# Patient Record
Sex: FEMALE | Race: WHITE | NOT HISPANIC OR LATINO | Employment: UNEMPLOYED | URBAN - METROPOLITAN AREA
[De-identification: names, ages, dates, MRNs, and addresses within clinical notes are randomized per-mention and may not be internally consistent; named-entity substitution may affect disease eponyms.]

---

## 2018-02-12 ENCOUNTER — TRANSCRIBE ORDERS (OUTPATIENT)
Dept: ENDOCRINOLOGY | Facility: CLINIC | Age: 68
End: 2018-02-12

## 2018-02-12 DIAGNOSIS — E03.9 HYPOTHYROIDISM, UNSPECIFIED TYPE: Primary | ICD-10-CM

## 2018-02-16 ENCOUNTER — TELEPHONE (OUTPATIENT)
Dept: INTERNAL MEDICINE | Facility: CLINIC | Age: 68
End: 2018-02-16

## 2018-07-11 ENCOUNTER — OFFICE VISIT (OUTPATIENT)
Dept: ENDOCRINOLOGY | Facility: CLINIC | Age: 68
End: 2018-07-11

## 2018-07-11 VITALS
DIASTOLIC BLOOD PRESSURE: 76 MMHG | HEIGHT: 65 IN | BODY MASS INDEX: 38.35 KG/M2 | HEART RATE: 64 BPM | OXYGEN SATURATION: 98 % | WEIGHT: 230.2 LBS | SYSTOLIC BLOOD PRESSURE: 126 MMHG

## 2018-07-11 DIAGNOSIS — R79.89 ABNORMAL THYROID BLOOD TEST: Primary | ICD-10-CM

## 2018-07-11 DIAGNOSIS — E61.1 IRON DEFICIENCY: ICD-10-CM

## 2018-07-11 LAB
HGB BLD-MCNC: 9.9 G/DL (ref 11.5–15.5)
IRON 24H UR-MRATE: 48 MCG/DL (ref 50–175)
IRON SATN MFR SERPL: 11 % (ref 15–50)
T4 FREE SERPL-MCNC: 1.27 NG/DL (ref 0.89–1.76)
TIBC SERPL-MCNC: 432 MCG/DL (ref 250–450)
TSH SERPL DL<=0.05 MIU/L-ACNC: 3.23 MIU/ML (ref 0.35–5.35)

## 2018-07-11 PROCEDURE — 83550 IRON BINDING TEST: CPT | Performed by: INTERNAL MEDICINE

## 2018-07-11 PROCEDURE — 99204 OFFICE O/P NEW MOD 45 MIN: CPT | Performed by: INTERNAL MEDICINE

## 2018-07-11 PROCEDURE — 83540 ASSAY OF IRON: CPT | Performed by: INTERNAL MEDICINE

## 2018-07-11 PROCEDURE — 86376 MICROSOMAL ANTIBODY EACH: CPT | Performed by: INTERNAL MEDICINE

## 2018-07-11 PROCEDURE — 84439 ASSAY OF FREE THYROXINE: CPT | Performed by: INTERNAL MEDICINE

## 2018-07-11 PROCEDURE — 84443 ASSAY THYROID STIM HORMONE: CPT | Performed by: INTERNAL MEDICINE

## 2018-07-11 PROCEDURE — 85018 HEMOGLOBIN: CPT | Performed by: INTERNAL MEDICINE

## 2018-07-11 RX ORDER — ASPIRIN 81 MG/1
81 TABLET, CHEWABLE ORAL DAILY
COMMUNITY

## 2018-07-11 RX ORDER — TRAZODONE HYDROCHLORIDE 100 MG/1
100 TABLET ORAL NIGHTLY
COMMUNITY
End: 2022-04-25

## 2018-07-11 RX ORDER — METOPROLOL SUCCINATE 50 MG/1
50 TABLET, EXTENDED RELEASE ORAL DAILY
COMMUNITY
End: 2020-03-24 | Stop reason: SDUPTHER

## 2018-07-11 RX ORDER — ATORVASTATIN CALCIUM 20 MG/1
20 TABLET, FILM COATED ORAL DAILY
COMMUNITY
End: 2022-06-01

## 2018-07-11 RX ORDER — OMEPRAZOLE 40 MG/1
40 CAPSULE, DELAYED RELEASE ORAL DAILY
COMMUNITY
End: 2022-04-05

## 2018-07-11 RX ORDER — POTASSIUM CHLORIDE 20MEQ/15ML
LIQUID (ML) ORAL DAILY
COMMUNITY
End: 2022-06-25

## 2018-07-11 RX ORDER — FUROSEMIDE 40 MG/1
40 TABLET ORAL 2 TIMES DAILY
COMMUNITY
End: 2022-09-08

## 2018-07-11 RX ORDER — ALBUTEROL SULFATE 1.25 MG/3ML
1 SOLUTION RESPIRATORY (INHALATION) EVERY 6 HOURS PRN
COMMUNITY
End: 2022-09-08

## 2018-07-11 NOTE — PROGRESS NOTES
Hypothyroidism (Consult for Dr Farrell)    Subjective    Laura Hui is a 68 y.o. female. she is being seen for consultation today at the request of  Sang Farrell MD for evaluation of abnormal thyroid tests.   Patient had abnormal TSh 6.9 during routine labs in 1/2018. Repeat levels in 4/2018 showed normal TFT.   She reported sx of fatigue, leg swelling, weight gain from 170 to 230 lbs.   She has a history of cardiomyopathy and takes diuretics.     Labs on 2011 showed suppressed TSh 0.01 and 0.3. Old labs and records reviewed.       History of Present Illness    Review of Systems  Review of Systems   Constitutional: Positive for fatigue and unexpected weight gain.   HENT: Positive for hearing loss.    Respiratory: Positive for shortness of breath.    Cardiovascular: Positive for palpitations and leg swelling.   Gastrointestinal: Positive for constipation (on iron).   Musculoskeletal: Positive for joint swelling and myalgias.   Neurological: Positive for weakness and headache.   Hematological: Positive for adenopathy.   Psychiatric/Behavioral: Positive for sleep disturbance.   All other systems reviewed and are negative.    Current medications:  Current Outpatient Prescriptions   Medication Sig Dispense Refill   • albuterol (ACCUNEB) 1.25 MG/3ML nebulizer solution Take 1 ampule by nebulization Every 6 (Six) Hours As Needed for Wheezing.     • aspirin 81 MG chewable tablet Chew 81 mg Daily.     • atorvastatin (LIPITOR) 20 MG tablet Take 20 mg by mouth Daily.     • furosemide (LASIX) 40 MG tablet Take 40 mg by mouth 2 (Two) Times a Day.     • metoprolol succinate XL (TOPROL-XL) 50 MG 24 hr tablet Take 50 mg by mouth Daily.     • omeprazole (priLOSEC) 40 MG capsule Take 40 mg by mouth Daily.     • Ped Multivitamins-Fl-Iron (MULTIVITAMIN WITH FLUORIDE/IRON) 0.25-10 MG/ML solution solution Take  by mouth Daily.     • potassium chloride (KAYCIEL) 20 MEQ/15ML (10%) solution Take  by mouth Daily.     • traZODone  "(DESYREL) 100 MG tablet Take 100 mg by mouth Every Night.       No current facility-administered medications for this visit.        The following portions of the patient's history were reviewed and updated as appropriate: She  has a past medical history of Acid reflux; Arthritis; COPD (chronic obstructive pulmonary disease) (CMS/HCC); Depression; Gall stones; and Heart disease.  She  has a past surgical history that includes Cardiac surgery; Back surgery; Neck surgery; and Cholecystectomy.  Her family history includes Arthritis in her maternal grandmother; Breast cancer in her mother; Heart attack in her maternal grandmother; Multiple sclerosis in her mother; Thyroid disease in her mother.  She  reports that she quit smoking about 10 years ago. She does not have any smokeless tobacco history on file. She reports that she does not drink alcohol or use drugs.  She is allergic to sulfa antibiotics..        Objective      Vitals:    07/11/18 0832   BP: 126/76   Pulse: 64   SpO2: 98%   Weight: 104 kg (230 lb 3.2 oz)   Height: 165.1 cm (65\")   Body mass index is 38.31 kg/m².  Physical Exam   Constitutional: She is oriented to person, place, and time. She appears well-developed and well-nourished.   HENT:   Head: Normocephalic and atraumatic.   Eyes: Conjunctivae and EOM are normal. Pupils are equal, round, and reactive to light.   Neck: Thyromegaly present.   Cardiovascular: Normal rate, regular rhythm, normal heart sounds and intact distal pulses.    Pulmonary/Chest: Effort normal and breath sounds normal.   Musculoskeletal: She exhibits no edema.   Lymphadenopathy:     She has no cervical adenopathy.   Neurological: She is alert and oriented to person, place, and time.   Psychiatric: She has a normal mood and affect. Thought content normal.       LABS AND IMAGING  No visits with results within 1 Month(s) from this visit.   Latest known visit with results is:   No results found for any previous visit.       1. Abnormal " thyroid blood test    2. Iron deficiency        Assessment/Plan      Problem List Items Addressed This Visit        Other    Abnormal thyroid blood test - Primary    Relevant Orders    T4, Free    TSH    Thyroid Peroxidase Antibody      Other Visit Diagnoses     Iron deficiency        Relevant Orders    Iron Profile    Hemoglobin            PLAN  -  Patient had fluctuating thyroid levels over the years. Labs in Jan suggestive of hypothyroidism.   I will repeat comprehensive thyroid panel and consider low dose levothyroxine.   Patient had thyromegaly on the exam and I have performed bedside u/s. She has normal architecture exam with several nodules with egg shell calcifications.   Right nodule is 8 x 10 mm in size and left nodule has linear calcifications, measures1.13 cm in largest dimension. Both nodules appear benign.       - diagnosis was discussed with the patient, and her questions were answered.     - Test and referrals ordered:  Orders Placed This Encounter   Procedures   • T4, Free   • TSH   • Thyroid Peroxidase Antibody   • Iron Profile     Order Specific Question:   Is the patient on iron therapy?     Answer:   No   • Hemoglobin   I will send a copy to Dr Farrell, included iron test.   - repeat u/s in 18 months.

## 2018-07-12 LAB — THYROPEROXIDASE AB SERPL-ACNC: 16 IU/ML (ref 0–34)

## 2020-03-24 ENCOUNTER — OFFICE VISIT (OUTPATIENT)
Dept: ENDOCRINOLOGY | Facility: CLINIC | Age: 70
End: 2020-03-24

## 2020-03-24 VITALS
DIASTOLIC BLOOD PRESSURE: 78 MMHG | WEIGHT: 234.2 LBS | OXYGEN SATURATION: 95 % | BODY MASS INDEX: 36.76 KG/M2 | HEIGHT: 67 IN | HEART RATE: 60 BPM | SYSTOLIC BLOOD PRESSURE: 124 MMHG

## 2020-03-24 DIAGNOSIS — R79.89 ABNORMAL THYROID BLOOD TEST: Primary | ICD-10-CM

## 2020-03-24 DIAGNOSIS — E04.2 NONTOXIC MULTINODULAR GOITER: ICD-10-CM

## 2020-03-24 LAB
ALBUMIN SERPL-MCNC: 4.1 G/DL (ref 3.5–5.2)
ALBUMIN/GLOB SERPL: 1.6 G/DL
ALP SERPL-CCNC: 144 U/L (ref 39–117)
ALT SERPL W P-5'-P-CCNC: 14 U/L (ref 1–33)
ANION GAP SERPL CALCULATED.3IONS-SCNC: 10.4 MMOL/L (ref 5–15)
AST SERPL-CCNC: 22 U/L (ref 1–32)
BILIRUB SERPL-MCNC: 0.3 MG/DL (ref 0.2–1.2)
BUN BLD-MCNC: 7 MG/DL (ref 8–23)
BUN/CREAT SERPL: 8 (ref 7–25)
CALCIUM SPEC-SCNC: 9.9 MG/DL (ref 8.6–10.5)
CHLORIDE SERPL-SCNC: 100 MMOL/L (ref 98–107)
CO2 SERPL-SCNC: 29.6 MMOL/L (ref 22–29)
CREAT BLD-MCNC: 0.87 MG/DL (ref 0.57–1)
GFR SERPL CREATININE-BSD FRML MDRD: 64 ML/MIN/1.73
GLOBULIN UR ELPH-MCNC: 2.5 GM/DL
GLUCOSE BLD-MCNC: 93 MG/DL (ref 65–99)
POTASSIUM BLD-SCNC: 4.2 MMOL/L (ref 3.5–5.2)
PROT SERPL-MCNC: 6.6 G/DL (ref 6–8.5)
SODIUM BLD-SCNC: 140 MMOL/L (ref 136–145)
T4 FREE SERPL-MCNC: 1.25 NG/DL (ref 0.93–1.7)
TSH SERPL DL<=0.05 MIU/L-ACNC: 1.9 UIU/ML (ref 0.27–4.2)

## 2020-03-24 PROCEDURE — 80053 COMPREHEN METABOLIC PANEL: CPT | Performed by: INTERNAL MEDICINE

## 2020-03-24 PROCEDURE — 84439 ASSAY OF FREE THYROXINE: CPT | Performed by: INTERNAL MEDICINE

## 2020-03-24 PROCEDURE — 76536 US EXAM OF HEAD AND NECK: CPT | Performed by: INTERNAL MEDICINE

## 2020-03-24 PROCEDURE — 99213 OFFICE O/P EST LOW 20 MIN: CPT | Performed by: INTERNAL MEDICINE

## 2020-03-24 PROCEDURE — 84443 ASSAY THYROID STIM HORMONE: CPT | Performed by: INTERNAL MEDICINE

## 2020-03-24 RX ORDER — POTASSIUM CHLORIDE 20 MEQ/1
TABLET, EXTENDED RELEASE ORAL
COMMUNITY
Start: 2020-03-03 | End: 2022-06-25 | Stop reason: SDUPTHER

## 2020-03-24 RX ORDER — CITALOPRAM 20 MG/1
TABLET ORAL
COMMUNITY
Start: 2020-03-20 | End: 2022-04-05

## 2020-03-24 RX ORDER — METOPROLOL TARTRATE 50 MG/1
TABLET, FILM COATED ORAL
COMMUNITY
Start: 2020-03-12 | End: 2022-07-29

## 2020-03-24 NOTE — PROGRESS NOTES
Thyroid Problem (Follow Up:  Son passed away last week. )    Subjective    Laura Hui is a 70 y.o. female. she is being seen for follow-up of abnormal thyroid tests.   Patient had abnormal TSh 6.9 during routine labs in 1/2018. Repeat levels in 7/2018 showed normal TFT. Bedside u/s evaluation was done at that time and 1 cm nodule left lobe seen   She reported sx of fatigue, leg swelling, weight gain from 170 to 234 lbs.   Sx are stable and unchanged. C/o swelling in the supraclavicular area.       Thyroid Problem   Symptoms include constipation (on iron), fatigue, palpitations and weight gain.       Review of Systems  Review of Systems   Constitutional: Positive for fatigue and unexpected weight gain.   HENT: Positive for hearing loss.    Respiratory: Positive for shortness of breath.    Cardiovascular: Positive for palpitations and leg swelling.   Gastrointestinal: Positive for constipation (on iron).   Musculoskeletal: Positive for joint swelling and myalgias.   Neurological: Positive for weakness and headache.   Hematological: Positive for adenopathy.   Psychiatric/Behavioral: Positive for sleep disturbance.   All other systems reviewed and are negative.    Current medications:  Current Outpatient Medications   Medication Sig Dispense Refill   • albuterol (ACCUNEB) 1.25 MG/3ML nebulizer solution Take 1 ampule by nebulization Every 6 (Six) Hours As Needed for Wheezing.     • aspirin 81 MG chewable tablet Chew 81 mg Daily.     • atorvastatin (LIPITOR) 20 MG tablet Take 20 mg by mouth Daily.     • furosemide (LASIX) 40 MG tablet Take 40 mg by mouth 2 (Two) Times a Day.     • omeprazole (priLOSEC) 40 MG capsule Take 40 mg by mouth Daily.     • Ped Multivitamins-Fl-Iron (MULTIVITAMIN WITH FLUORIDE/IRON) 0.25-10 MG/ML solution solution Take  by mouth Daily.     • potassium chloride (KAYCIEL) 20 MEQ/15ML (10%) solution Take  by mouth Daily.     • traZODone (DESYREL) 100 MG tablet Take 100 mg by mouth Every Night.    "  • citalopram (CeleXA) 20 MG tablet      • metoprolol tartrate (LOPRESSOR) 50 MG tablet      • potassium chloride (K-DUR,KLOR-CON) 20 MEQ CR tablet      • TRELEGY ELLIPTA 100-62.5-25 MCG/INH aerosol powder         No current facility-administered medications for this visit.        The following portions of the patient's history were reviewed and updated as appropriate: She  has a past medical history of Acid reflux, Arthritis, COPD (chronic obstructive pulmonary disease) (CMS/HCC), Depression, Gall stones, and Heart disease.  She  has a past surgical history that includes Cardiac surgery; Back surgery; Neck surgery; and Cholecystectomy.  Her family history includes Arthritis in her maternal grandmother; Breast cancer in her mother; Cancer in an other family member; Heart attack in her maternal grandmother; Multiple sclerosis in her mother; Thyroid disease in her mother.  She  reports that she quit smoking about 11 years ago. She does not have any smokeless tobacco history on file. She reports that she does not drink alcohol or use drugs.  She is allergic to sulfa antibiotics..        Objective      Vitals:    03/24/20 1410   BP: 124/78   Pulse: 60   SpO2: 95%   Weight: 106 kg (234 lb 3.2 oz)   Height: 170.2 cm (67\")   Body mass index is 36.68 kg/m².  Physical Exam   Constitutional: She is oriented to person, place, and time. She appears well-developed and well-nourished.   Obese. Fat deposition above the clavicle. No tumors palpated    HENT:   Head: Normocephalic and atraumatic.   Eyes: Conjunctivae are normal.   Neck: Thyromegaly present.   Cardiovascular: Normal rate, regular rhythm, normal heart sounds and intact distal pulses.   Pulmonary/Chest: Effort normal and breath sounds normal.   Musculoskeletal: She exhibits no edema.   Lymphadenopathy:     She has no cervical adenopathy.   Neurological: She is alert and oriented to person, place, and time.   Psychiatric: She has a normal mood and affect. Thought " content normal.       LABS AND IMAGING  No visits with results within 1 Month(s) from this visit.   Latest known visit with results is:   Office Visit on 07/11/2018   Component Date Value Ref Range Status   • Free T4 07/11/2018 1.27  0.89 - 1.76 ng/dL Final   • TSH 07/11/2018 3.233  0.350 - 5.350 mIU/mL Final   • Thyroid Peroxidase Antibody 07/11/2018 16  0 - 34 IU/mL Final   • Iron 07/11/2018 48* 50 - 175 mcg/dL Final   • TIBC 07/11/2018 432  250 - 450 mcg/dL Final   • Iron Saturation 07/11/2018 11* 15 - 50 % Final   • Hemoglobin 07/11/2018 9.9* 11.5 - 15.5 g/dL Final     Thyroid ultrasound 03/24/20        Real time high resolution imaging of the thyroid gland was performed in transverse and longitudinal planes.   Previous images were reviewed and compared to the current appearance to assess stability.       The right lobe measured 4.78 cm L x 1.37 cm AP x 1.71 cm in TV dimension.    The isthmus measured 0.36 cm in thickness.    The left thyroid lobe measured 4.36 cm L x 1.55 cm AP x 1.52 cm in TV dimension.    Thyroid gland is heterogeneous and contains multiple nodules.    Nodule 1   is located in the right mid lobe and measures 0.75  x 0.65 x 0.75 cm (L X AP X TV).  This nodule is solid, homogeneous, hypoechoic with well-defined margins, and Grade I vascularity on Color Flow Doppler.  It has artifacts:  eggshell calcifications    Left lobe contains 1.03 cm linear calcification.     No pathologic lymph nodes were seen.      Assessment: Stable appearance of the thyroid   Follow-up ultrasound in 18 months      1. Abnormal thyroid blood test    2. Nontoxic multinodular goiter        Assessment/Plan      Problem List Items Addressed This Visit        Other    Abnormal thyroid blood test - Primary      Other Visit Diagnoses     Nontoxic multinodular goiter        Relevant Medications    metoprolol tartrate (LOPRESSOR) 50 MG tablet            PLAN  -  Patient had fluctuating thyroid levels over the years. Labs repeated  today    -thyroid u/s showed stable nodules.     - repeat u/s in 18 months.

## 2022-03-31 RX ORDER — BUMETANIDE 1 MG/1
1 TABLET ORAL DAILY
COMMUNITY
End: 2022-03-31 | Stop reason: SDUPTHER

## 2022-04-01 RX ORDER — BUMETANIDE 1 MG/1
1 TABLET ORAL DAILY
Qty: 30 TABLET | Refills: 0 | Status: SHIPPED | OUTPATIENT
Start: 2022-04-01 | End: 2022-05-09 | Stop reason: SDUPTHER

## 2022-04-05 RX ORDER — OMEPRAZOLE 40 MG/1
CAPSULE, DELAYED RELEASE ORAL
Qty: 90 CAPSULE | Refills: 0 | Status: SHIPPED | OUTPATIENT
Start: 2022-04-05 | End: 2022-07-05

## 2022-04-05 RX ORDER — CITALOPRAM 20 MG/1
TABLET ORAL
Qty: 90 TABLET | Refills: 0 | Status: SHIPPED | OUTPATIENT
Start: 2022-04-05 | End: 2022-07-05

## 2022-04-25 RX ORDER — TRAZODONE HYDROCHLORIDE 100 MG/1
TABLET ORAL
Qty: 180 TABLET | Refills: 1 | Status: SHIPPED | OUTPATIENT
Start: 2022-04-25 | End: 2022-09-08 | Stop reason: SDUPTHER

## 2022-05-03 RX ORDER — FLUTICASONE PROPIONATE 50 MCG
SPRAY, SUSPENSION (ML) NASAL
Qty: 16 ML | Refills: 2 | Status: SHIPPED | OUTPATIENT
Start: 2022-05-03 | End: 2022-09-07 | Stop reason: SDUPTHER

## 2022-05-09 ENCOUNTER — TELEPHONE (OUTPATIENT)
Dept: FAMILY MEDICINE CLINIC | Facility: CLINIC | Age: 72
End: 2022-05-09

## 2022-05-09 RX ORDER — BUMETANIDE 1 MG/1
1 TABLET ORAL DAILY
Qty: 30 TABLET | Refills: 0 | Status: SHIPPED | OUTPATIENT
Start: 2022-05-09 | End: 2022-06-07

## 2022-05-09 NOTE — TELEPHONE ENCOUNTER
Incoming Refill Request      Medication requested (name and dose):   BUMETANIDE 1 MG    [facility] where [facility] be sent[facility]    Additional details provided by patient: PT TOOK LAST DOSE TODAY    Best call back number: [phone]    Does the patient have less than a 3 day supply:  [x] Yes  [] No    Maulik ARREAGA Rep  05/09/22, 11:10 EDT

## 2022-06-01 RX ORDER — ATORVASTATIN CALCIUM 20 MG/1
TABLET, FILM COATED ORAL
Qty: 90 TABLET | Refills: 0 | Status: SHIPPED | OUTPATIENT
Start: 2022-06-01 | End: 2022-09-01

## 2022-06-07 RX ORDER — BUMETANIDE 1 MG/1
TABLET ORAL
Qty: 30 TABLET | Refills: 0 | Status: SHIPPED | OUTPATIENT
Start: 2022-06-07 | End: 2022-07-05

## 2022-06-24 ENCOUNTER — TELEPHONE (OUTPATIENT)
Dept: FAMILY MEDICINE CLINIC | Facility: CLINIC | Age: 72
End: 2022-06-24

## 2022-06-24 NOTE — TELEPHONE ENCOUNTER
Pharmacy called. Said that Pt's insurance will no longer fill potassium chloride packets but will fill potassium chloride -[employer]. Pharmacy is requesting updated prescription be sent over.

## 2022-06-25 RX ORDER — POTASSIUM CHLORIDE 20 MEQ/1
20 TABLET, EXTENDED RELEASE ORAL DAILY
Qty: 90 TABLET | Refills: 1 | Status: SHIPPED | OUTPATIENT
Start: 2022-06-25 | End: 2022-09-08 | Stop reason: SDUPTHER

## 2022-07-05 RX ORDER — OMEPRAZOLE 40 MG/1
CAPSULE, DELAYED RELEASE ORAL
Qty: 90 CAPSULE | Refills: 0 | Status: SHIPPED | OUTPATIENT
Start: 2022-07-05 | End: 2022-09-08 | Stop reason: SDUPTHER

## 2022-07-05 RX ORDER — BUMETANIDE 1 MG/1
TABLET ORAL
Qty: 30 TABLET | Refills: 0 | Status: SHIPPED | OUTPATIENT
Start: 2022-07-05 | End: 2022-08-09

## 2022-07-05 RX ORDER — CITALOPRAM 20 MG/1
TABLET ORAL
Qty: 90 TABLET | Refills: 0 | Status: SHIPPED | OUTPATIENT
Start: 2022-07-05 | End: 2022-09-08 | Stop reason: SDUPTHER

## 2022-07-22 RX ORDER — AZELASTINE 1 MG/ML
SPRAY, METERED NASAL
Qty: 30 ML | Refills: 2 | Status: SHIPPED | OUTPATIENT
Start: 2022-07-22 | End: 2023-02-06

## 2022-07-29 RX ORDER — METOPROLOL TARTRATE 50 MG/1
TABLET, FILM COATED ORAL
Qty: 180 TABLET | Refills: 0 | Status: SHIPPED | OUTPATIENT
Start: 2022-07-29 | End: 2022-09-08 | Stop reason: SDUPTHER

## 2022-08-09 RX ORDER — BUMETANIDE 1 MG/1
TABLET ORAL
Qty: 30 TABLET | Refills: 0 | Status: SHIPPED | OUTPATIENT
Start: 2022-08-09 | End: 2022-09-07 | Stop reason: SDUPTHER

## 2022-08-09 NOTE — TELEPHONE ENCOUNTER
Caller: Laura Hui    Relationship: Self    Best call back number: [phone]    Requested Prescriptions:   Requested Prescriptions     Pending Prescriptions Disp Refills   • bumetanide (BUMEX) 1 MG tablet [Pharmacy Med Name: BUMETANIDE 1 MG TABLET] 30 tablet 0     Sig: TAKE ONE TABLET BY MOUTH DAILY        [facility] where request should be sent: [facility] [address] [place] [address] [address] - [phone] [facility] [phone] FX     Additional details provided by patient:   PATIENT IS COMPLETELY OUT OF MEDICATION     Does the patient have less than a 3 day supply:  [x] Yes  [] No    Maulik Vera Rep   08/09/22 12:55 EDT

## 2022-09-01 RX ORDER — ATORVASTATIN CALCIUM 20 MG/1
TABLET, FILM COATED ORAL
Qty: 30 TABLET | Refills: 0 | Status: SHIPPED | OUTPATIENT
Start: 2022-09-01 | End: 2022-09-08 | Stop reason: SDUPTHER

## 2022-09-01 RX ORDER — FLUTICASONE PROPIONATE 50 MCG
SPRAY, SUSPENSION (ML) NASAL
Qty: 16 ML | Refills: 2 | OUTPATIENT
Start: 2022-09-01

## 2022-09-01 RX ORDER — BUMETANIDE 1 MG/1
TABLET ORAL
Qty: 30 TABLET | Refills: 0 | OUTPATIENT
Start: 2022-09-01

## 2022-09-06 ENCOUNTER — TELEPHONE (OUTPATIENT)
Dept: FAMILY MEDICINE CLINIC | Facility: CLINIC | Age: 72
End: 2022-09-06

## 2022-09-06 ENCOUNTER — LAB (OUTPATIENT)
Dept: FAMILY MEDICINE CLINIC | Facility: CLINIC | Age: 72
End: 2022-09-06

## 2022-09-06 DIAGNOSIS — I10 ESSENTIAL HYPERTENSION: Primary | ICD-10-CM

## 2022-09-06 DIAGNOSIS — E78.2 MIXED HYPERLIPIDEMIA: ICD-10-CM

## 2022-09-06 DIAGNOSIS — I10 ESSENTIAL HYPERTENSION: ICD-10-CM

## 2022-09-06 DIAGNOSIS — F33.0 MILD EPISODE OF RECURRENT MAJOR DEPRESSIVE DISORDER: ICD-10-CM

## 2022-09-06 PROCEDURE — 36415 COLL VENOUS BLD VENIPUNCTURE: CPT | Performed by: STUDENT IN AN ORGANIZED HEALTH CARE EDUCATION/TRAINING PROGRAM

## 2022-09-06 NOTE — TELEPHONE ENCOUNTER
Incoming Refill Request      Medication requested (name and dose): FLUTICASONE PROPIONATE 50CG  BUMETANIDE 1MG     [facility] where request [facility] be sent: [facility]    Additional details provided by patient: PT HAS ONE DAY LEFT, THESE WERE LEFT OFF THE ORIGINAL MEDICATION REQUEST    Best call back number: [phone]    Does the patient have less than a 3 day supply:  [x] Yes  [] No    Maulik Cuevas Rep  09/06/22, 10:51 EDT

## 2022-09-07 LAB
ALBUMIN SERPL-MCNC: 3.5 G/DL (ref 3.7–4.7)
ALBUMIN/GLOB SERPL: 1.3 {RATIO} (ref 1.2–2.2)
ALP SERPL-CCNC: 280 IU/L (ref 44–121)
ALT SERPL-CCNC: 26 IU/L (ref 0–32)
AST SERPL-CCNC: 67 IU/L (ref 0–40)
BASOPHILS # BLD AUTO: 0 X10E3/UL (ref 0–0.2)
BASOPHILS NFR BLD AUTO: 1 %
BILIRUB SERPL-MCNC: 1.1 MG/DL (ref 0–1.2)
BUN SERPL-MCNC: 3 MG/DL (ref 8–27)
BUN/CREAT SERPL: 4 (ref 12–28)
CALCIUM SERPL-MCNC: 9.1 MG/DL (ref 8.7–10.3)
CHLORIDE SERPL-SCNC: 100 MMOL/L (ref 96–106)
CHOLEST SERPL-MCNC: 141 MG/DL (ref 100–199)
CO2 SERPL-SCNC: 24 MMOL/L (ref 20–29)
CREAT SERPL-MCNC: 0.68 MG/DL (ref 0.57–1)
EGFRCR-CYS SERPLBLD CKD-EPI 2021: 92 ML/MIN/1.73
EOSINOPHIL # BLD AUTO: 0.2 X10E3/UL (ref 0–0.4)
EOSINOPHIL NFR BLD AUTO: 3 %
ERYTHROCYTE [DISTWIDTH] IN BLOOD BY AUTOMATED COUNT: 12.2 % (ref 11.7–15.4)
GLOBULIN SER CALC-MCNC: 2.8 G/DL (ref 1.5–4.5)
GLUCOSE SERPL-MCNC: 110 MG/DL (ref 65–99)
HCT VFR BLD AUTO: 43.5 % (ref 34–46.6)
HDLC SERPL-MCNC: 50 MG/DL
HGB BLD-MCNC: 15.2 G/DL (ref 11.1–15.9)
IMM GRANULOCYTES # BLD AUTO: 0 X10E3/UL (ref 0–0.1)
IMM GRANULOCYTES NFR BLD AUTO: 0 %
LDLC SERPL CALC-MCNC: 69 MG/DL (ref 0–99)
LYMPHOCYTES # BLD AUTO: 1.1 X10E3/UL (ref 0.7–3.1)
LYMPHOCYTES NFR BLD AUTO: 20 %
MCH RBC QN AUTO: 32.5 PG (ref 26.6–33)
MCHC RBC AUTO-ENTMCNC: 34.9 G/DL (ref 31.5–35.7)
MCV RBC AUTO: 93 FL (ref 79–97)
MONOCYTES # BLD AUTO: 0.7 X10E3/UL (ref 0.1–0.9)
MONOCYTES NFR BLD AUTO: 12 %
NEUTROPHILS # BLD AUTO: 3.6 X10E3/UL (ref 1.4–7)
NEUTROPHILS NFR BLD AUTO: 64 %
PLATELET # BLD AUTO: 204 X10E3/UL (ref 150–450)
POTASSIUM SERPL-SCNC: 4.3 MMOL/L (ref 3.5–5.2)
PROT SERPL-MCNC: 6.3 G/DL (ref 6–8.5)
RBC # BLD AUTO: 4.67 X10E6/UL (ref 3.77–5.28)
SODIUM SERPL-SCNC: 139 MMOL/L (ref 134–144)
TRIGL SERPL-MCNC: 127 MG/DL (ref 0–149)
VLDLC SERPL CALC-MCNC: 22 MG/DL (ref 5–40)
WBC # BLD AUTO: 5.6 X10E3/UL (ref 3.4–10.8)

## 2022-09-07 RX ORDER — BUMETANIDE 1 MG/1
1 TABLET ORAL DAILY
Qty: 30 TABLET | Refills: 0 | Status: SHIPPED | OUTPATIENT
Start: 2022-09-07 | End: 2022-09-08 | Stop reason: SDUPTHER

## 2022-09-07 RX ORDER — FLUTICASONE PROPIONATE 50 MCG
2 SPRAY, SUSPENSION (ML) NASAL DAILY
Qty: 16 ML | Refills: 2 | Status: SHIPPED | OUTPATIENT
Start: 2022-09-07 | End: 2023-03-22

## 2022-09-08 ENCOUNTER — OFFICE VISIT (OUTPATIENT)
Dept: FAMILY MEDICINE CLINIC | Facility: CLINIC | Age: 72
End: 2022-09-08

## 2022-09-08 VITALS
HEIGHT: 67 IN | DIASTOLIC BLOOD PRESSURE: 66 MMHG | WEIGHT: 219 LBS | BODY MASS INDEX: 34.37 KG/M2 | SYSTOLIC BLOOD PRESSURE: 116 MMHG

## 2022-09-08 DIAGNOSIS — F33.1 MAJOR DEPRESSIVE DISORDER, RECURRENT, MODERATE: ICD-10-CM

## 2022-09-08 DIAGNOSIS — J43.8 OTHER EMPHYSEMA: Primary | ICD-10-CM

## 2022-09-08 DIAGNOSIS — I70.218 ATHEROSCLEROSIS OF NATIVE ARTERIES OF EXTREMITIES WITH INTERMITTENT CLAUDICATION, OTHER EXTREMITY: ICD-10-CM

## 2022-09-08 DIAGNOSIS — I10 ESSENTIAL HYPERTENSION: ICD-10-CM

## 2022-09-08 PROBLEM — E66.9 OBESITY (BMI 30.0-34.9): Status: ACTIVE | Noted: 2022-09-08

## 2022-09-08 PROCEDURE — 99214 OFFICE O/P EST MOD 30 MIN: CPT | Performed by: STUDENT IN AN ORGANIZED HEALTH CARE EDUCATION/TRAINING PROGRAM

## 2022-09-08 RX ORDER — ROFLUMILAST 250 UG/1
250 TABLET ORAL DAILY
Qty: 90 TABLET | Refills: 1 | Status: SHIPPED | OUTPATIENT
Start: 2022-09-08 | End: 2022-12-06 | Stop reason: ALTCHOICE

## 2022-09-08 RX ORDER — ATORVASTATIN CALCIUM 20 MG/1
20 TABLET, FILM COATED ORAL DAILY
Qty: 90 TABLET | Refills: 1 | Status: SHIPPED | OUTPATIENT
Start: 2022-09-08

## 2022-09-08 RX ORDER — POTASSIUM CHLORIDE 20 MEQ/1
20 TABLET, EXTENDED RELEASE ORAL DAILY
Qty: 90 TABLET | Refills: 1 | Status: SHIPPED | OUTPATIENT
Start: 2022-09-08

## 2022-09-08 RX ORDER — TRAZODONE HYDROCHLORIDE 100 MG/1
200 TABLET ORAL NIGHTLY
Qty: 180 TABLET | Refills: 1 | Status: SHIPPED | OUTPATIENT
Start: 2022-09-08

## 2022-09-08 RX ORDER — OMEPRAZOLE 40 MG/1
40 CAPSULE, DELAYED RELEASE ORAL
Qty: 90 CAPSULE | Refills: 1 | Status: SHIPPED | OUTPATIENT
Start: 2022-09-08 | End: 2023-03-22

## 2022-09-08 RX ORDER — ALBUTEROL SULFATE 90 UG/1
2 AEROSOL, METERED RESPIRATORY (INHALATION) EVERY 4 HOURS PRN
Qty: 8 G | Refills: 3 | Status: SHIPPED | OUTPATIENT
Start: 2022-09-08

## 2022-09-08 RX ORDER — BUMETANIDE 1 MG/1
1 TABLET ORAL DAILY
Qty: 30 TABLET | Refills: 0 | Status: SHIPPED | OUTPATIENT
Start: 2022-09-08 | End: 2022-11-01

## 2022-09-08 RX ORDER — METOPROLOL TARTRATE 50 MG/1
50 TABLET, FILM COATED ORAL 2 TIMES DAILY WITH MEALS
Qty: 180 TABLET | Refills: 1 | Status: SHIPPED | OUTPATIENT
Start: 2022-09-08

## 2022-09-08 RX ORDER — CITALOPRAM 20 MG/1
20 TABLET ORAL DAILY
Qty: 90 TABLET | Refills: 1 | Status: SHIPPED | OUTPATIENT
Start: 2022-09-08

## 2022-09-08 RX ORDER — ROFLUMILAST 250 UG/1
250 TABLET ORAL DAILY
COMMUNITY
Start: 2022-08-17 | End: 2022-09-08 | Stop reason: SDUPTHER

## 2022-09-08 NOTE — PROGRESS NOTES
"Chief Complaint  Med Refill (Also has complaints of breathing issues X 3-4 weeks )    Subjective          Laura Hui presents to [facility]  History of Present Illness    She states that she has been having more trouble with her breathing . She states that she has been having more difficulty with this just over the past few days. She states that she continues to use the trelegy, and daliresp but she has not been taking any Albuterol as she has been out of it, and previously she was having trouble with palpitations when using this more frequently.     HTN: She does check her blood pressure at home.  She states that it is running well.  She is tolerating all medications without any side effects.  She does need refills today    Hyperlipidemia: Tolerating atorvastatin.  Needs a refill.  No side effects or intolerances    GERD: Symptoms are well controlled with omeprazole.  She has tried This before with worsening of her symptoms.  Will refill medication today.    COPD: Usually managed and monitored by pulmonology, however it has been approximately 1 year since she has seen them.  She needs medication refills today.     Objective   Vital Signs:   /66 (BP Location: Left arm, Patient Position: Sitting)   Ht 170.2 cm (67\")   Wt 99.3 kg (219 lb)   BMI 34.30 kg/m²     Body mass index is 34.3 kg/m².    Review of Systems    Past History:  Medical History: has a past medical history of Acid reflux, Anemia, Apnea, Arthritis, Backache, CHF (congestive heart failure) ([facility]), COPD (chronic obstructive pulmonary disease) ([facility]), Degeneration of lumbar intervertebral disc, Drug therapy, Emphysema, unspecified ([facility]), Esophageal reflux, Family history of malignant neoplasm of breast in first degree relative, Gall stones, GERD (gastroesophageal reflux disease), H/O spinal fusion, H/O: drug dependency (HCC), Hearing loss, Heart disease, Hypertrophic cardiomyopathy (HCC), Insomnia, Mixed " hyperlipidemia, Neurotic Depression, Recurrent major depressive episodes, moderate (HCC), Severe obesity (HCC), and Tobacco use.   Surgical History: has a past surgical history that includes Cardiac surgery; Back surgery; Neck surgery; Cholecystectomy; laparoscopic tubal ligation; orthopedic surgery; Hernia repair; Back surgery; Brain surgery; and Appendectomy.   Family History: family history includes Arthritis in her maternal grandmother; Breast cancer in her mother; Cancer in an other family member; Heart attack in her maternal grandmother; Multiple sclerosis in her mother; Thyroid disease in her mother.   Social History: reports that she quit smoking about 14 years ago. She does not have any smokeless tobacco history on file. She reports that she does not drink alcohol and does not use drugs.      Current Outpatient Medications:   •  aspirin 81 MG chewable tablet, Chew 81 mg Daily., Disp: , Rfl:   •  atorvastatin (LIPITOR) 20 MG tablet, Take 1 tablet by mouth Daily., Disp: 90 tablet, Rfl: 1  •  azelastine (ASTELIN) 0.1 % nasal spray, SPRAY 2 SPRAYS IN EACH NOSTRIL TWO TIMES A DAY, Disp: 30 mL, Rfl: 2  •  bumetanide (BUMEX) 1 MG tablet, Take 1 tablet by mouth Daily., Disp: 30 tablet, Rfl: 0  •  citalopram (CeleXA) 20 MG tablet, Take 1 tablet by mouth Daily., Disp: 90 tablet, Rfl: 1  •  Daliresp 250 MCG tablet tablet, Take 1 tablet by mouth Daily., Disp: 90 tablet, Rfl: 1  •  fluticasone (FLONASE) 50 MCG/ACT nasal spray, 2 sprays into the nostril(s) as directed by provider Daily., Disp: 16 mL, Rfl: 2  •  metoprolol tartrate (LOPRESSOR) 50 MG tablet, Take 1 tablet by mouth 2 (Two) Times a Day With Meals., Disp: 180 tablet, Rfl: 1  •  omeprazole (priLOSEC) 40 MG capsule, Take 1 capsule by mouth Every Morning Before Breakfast., Disp: 90 capsule, Rfl: 1  •  Ped Multivitamins-Fl-Iron (MULTIVITAMIN WITH FLUORIDE/IRON) 0.25-10 MG/ML solution solution, Take  by mouth Daily., Disp: , Rfl:   •  potassium chloride  (K-DUR,KLOR-CON) 20 MEQ CR tablet, Take 1 tablet by mouth Daily., Disp: 90 tablet, Rfl: 1  •  traZODone (DESYREL) 100 MG tablet, Take 2 tablets by mouth Every Night., Disp: 180 tablet, Rfl: 1  •  Trelegy Ellipta 100-62.5-25 MCG/INH inhaler, Inhale 1 puff Daily., Disp: 3 each, Rfl: 1  •  albuterol sulfate  (90 Base) MCG/ACT inhaler, Inhale 2 puffs Every 4 (Four) Hours As Needed for Wheezing., Disp: 8 g, Rfl: 3    Allergies: Sulfa antibiotics    Physical Exam  Constitutional:       General: She is not in acute distress.     Appearance: Normal appearance. She is not ill-appearing or toxic-appearing.   HENT:      Head: Normocephalic and atraumatic.      Right Ear: Tympanic membrane and ear canal normal.      Left Ear: Tympanic membrane and ear canal normal.   Neck:      Comments: Bilateral swelling in the trapezius area.   Cardiovascular:      Rate and Rhythm: Normal rate and regular rhythm.      Heart sounds: No murmur heard.    No gallop.   Pulmonary:      Effort: Pulmonary effort is normal.      Breath sounds: Normal breath sounds.      Comments: Decreased breath sounds bilaterally, but no wheezing, rales or rhonchi.  Abdominal:      General: Abdomen is flat.      Palpations: Abdomen is soft.      Tenderness: There is no abdominal tenderness. There is no guarding.   Musculoskeletal:      Right lower leg: No edema.      Left lower leg: No edema.   Lymphadenopathy:      Cervical: No cervical adenopathy.   Neurological:      General: No focal deficit present.      Mental Status: She is alert and oriented to person, place, and time.   Psychiatric:         Mood and Affect: Mood normal.         Thought Content: Thought content normal.          Result Review :                   Assessment and Plan    Diagnoses and all orders for this visit:    1. Other emphysema (HCC) (Primary)    2. Major depressive disorder, recurrent, moderate (HCC)    3. Atherosclerosis of native arteries of extremities with intermittent  claudication, other extremity (HCC)    4. Essential hypertension    Other orders  -     albuterol sulfate  (90 Base) MCG/ACT inhaler; Inhale 2 puffs Every 4 (Four) Hours As Needed for Wheezing.  Dispense: 8 g; Refill: 3  -     atorvastatin (LIPITOR) 20 MG tablet; Take 1 tablet by mouth Daily.  Dispense: 90 tablet; Refill: 1  -     bumetanide (BUMEX) 1 MG tablet; Take 1 tablet by mouth Daily.  Dispense: 30 tablet; Refill: 0  -     citalopram (CeleXA) 20 MG tablet; Take 1 tablet by mouth Daily.  Dispense: 90 tablet; Refill: 1  -     metoprolol tartrate (LOPRESSOR) 50 MG tablet; Take 1 tablet by mouth 2 (Two) Times a Day With Meals.  Dispense: 180 tablet; Refill: 1  -     omeprazole (priLOSEC) 40 MG capsule; Take 1 capsule by mouth Every Morning Before Breakfast.  Dispense: 90 capsule; Refill: 1  -     potassium chloride (K-DUR,KLOR-CON) 20 MEQ CR tablet; Take 1 tablet by mouth Daily.  Dispense: 90 tablet; Refill: 1  -     traZODone (DESYREL) 100 MG tablet; Take 2 tablets by mouth Every Night.  Dispense: 180 tablet; Refill: 1  -     Trelegy Ellipta 100-62.5-25 MCG/INH inhaler; Inhale 1 puff Daily.  Dispense: 3 each; Refill: 1  -     Daliresp 250 MCG tablet tablet; Take 1 tablet by mouth Daily.  Dispense: 90 tablet; Refill: 1    Blood work reviewed.  Discussed with patient that her transaminases are mildly elevated.  We will repeat this in about 6 months and if they continue to be elevated we will need to discuss adjusting medications such as atorvastatin.    Advised that she contact her pulmonology office and follow-up with them as she is due for her yearly visit and low-dose screening CT scan.  Advised that she should be using albuterol as needed, but she is unsure of which one she is taking in the past so we will send regular butyryl until her follow-up with pulmonology.    There are medications refilled as she is tolerating all of them without any side effect.    Follow Up   No follow-ups on file.  Patient  was given instructions and counseling regarding her condition or for health maintenance advice. Please see specific information pulled into the AVS if appropriate.     Carmella Barboza, DO

## 2022-11-01 RX ORDER — BUMETANIDE 1 MG/1
TABLET ORAL
Qty: 90 TABLET | Refills: 1 | Status: SHIPPED | OUTPATIENT
Start: 2022-11-01

## 2022-11-30 ENCOUNTER — TELEPHONE (OUTPATIENT)
Dept: FAMILY MEDICINE CLINIC | Facility: CLINIC | Age: 72
End: 2022-11-30

## 2022-12-06 ENCOUNTER — OFFICE VISIT (OUTPATIENT)
Dept: FAMILY MEDICINE CLINIC | Facility: CLINIC | Age: 72
End: 2022-12-06

## 2022-12-06 VITALS
BODY MASS INDEX: 33.41 KG/M2 | OXYGEN SATURATION: 93 % | DIASTOLIC BLOOD PRESSURE: 64 MMHG | TEMPERATURE: 97 F | SYSTOLIC BLOOD PRESSURE: 116 MMHG | WEIGHT: 213.3 LBS | RESPIRATION RATE: 18 BRPM | HEART RATE: 78 BPM

## 2022-12-06 DIAGNOSIS — I50.9 ACUTE ON CHRONIC CONGESTIVE HEART FAILURE, UNSPECIFIED HEART FAILURE TYPE: Primary | ICD-10-CM

## 2022-12-06 DIAGNOSIS — R60.0 BILATERAL LOWER EXTREMITY EDEMA: ICD-10-CM

## 2022-12-06 DIAGNOSIS — R06.02 SHORTNESS OF BREATH: ICD-10-CM

## 2022-12-06 LAB
EXPIRATION DATE: NORMAL
FLUAV AG UPPER RESP QL IA.RAPID: NOT DETECTED
FLUBV AG UPPER RESP QL IA.RAPID: NOT DETECTED
INTERNAL CONTROL: NORMAL
Lab: NORMAL
SARS-COV-2 AG UPPER RESP QL IA.RAPID: NOT DETECTED

## 2022-12-06 PROCEDURE — 87428 SARSCOV & INF VIR A&B AG IA: CPT | Performed by: STUDENT IN AN ORGANIZED HEALTH CARE EDUCATION/TRAINING PROGRAM

## 2022-12-06 PROCEDURE — 99214 OFFICE O/P EST MOD 30 MIN: CPT | Performed by: STUDENT IN AN ORGANIZED HEALTH CARE EDUCATION/TRAINING PROGRAM

## 2022-12-06 RX ORDER — ROFLUMILAST 500 UG/1
500 TABLET ORAL DAILY
COMMUNITY
Start: 2022-11-09 | End: 2023-03-07

## 2022-12-06 RX ORDER — FLUTICASONE FUROATE, UMECLIDINIUM BROMIDE AND VILANTEROL TRIFENATATE 200; 62.5; 25 UG/1; UG/1; UG/1
POWDER RESPIRATORY (INHALATION)
COMMUNITY
Start: 2022-12-01

## 2022-12-06 NOTE — PROGRESS NOTES
Chief Complaint  Leg Swelling (Bilateral, right leg has been leaking fluid for the last week, pt states it is clear fluid)    Subjective          Laura Hui presents to [facility]  History of Present Illness    Patient is here for evaluation of bilateral leg swelling worse on the right than the left for the past week.  Patient states that she has also been dealing with cough and congestion and some shortness of breath associated with the increased swelling.  She denies any fever or chills.She states that her swelling has gotten some worse and she is now some weeping in the legs bilaterally, worse on the right than the left.  She continues to take her Bumex and has not increased it recently.    Objective   Vital Signs:   /64 (BP Location: Left arm, Patient Position: Sitting, Cuff Size: Adult)   Pulse 78   Temp 97 °F (36.1 °C) (Infrared)   Resp 18   Wt 96.8 kg (213 lb 4.8 oz)   SpO2 93%   BMI 33.41 kg/m²     Body mass index is 33.41 kg/m².    Review of Systems    Past History:  Medical History: has a past medical history of Acid reflux, Anemia, Apnea, Arthritis, Backache, CHF (congestive heart failure) (HCC), COPD (chronic obstructive pulmonary disease) (HCC), Degeneration of lumbar intervertebral disc, Drug therapy, Emphysema, unspecified (HCC), Esophageal reflux, Family history of malignant neoplasm of breast in first degree relative, Gall stones, GERD (gastroesophageal reflux disease), H/O spinal fusion, H/O: drug dependency (HCC), Hearing loss, Heart disease, Hypertrophic cardiomyopathy (HCC), Insomnia, Mixed hyperlipidemia, Neurotic Depression, Recurrent major depressive episodes, moderate (HCC), Severe obesity (HCC), and Tobacco use.   Surgical History: has a past surgical history that includes Cardiac surgery; Back surgery; Neck surgery; Cholecystectomy; laparoscopic tubal ligation; orthopedic surgery; Hernia repair; Back surgery; Brain surgery; and Appendectomy.    Family History: family history includes Arthritis in her maternal grandmother; Breast cancer in her mother; Cancer in an other family member; Heart attack in her maternal grandmother; Multiple sclerosis in her mother; Thyroid disease in her mother.   Social History: reports that she quit smoking about 14 years ago. Her smoking use included cigarettes. She does not have any smokeless tobacco history on file. She reports that she does not drink alcohol and does not use drugs.      Current Outpatient Medications:   •  albuterol sulfate  (90 Base) MCG/ACT inhaler, Inhale 2 puffs Every 4 (Four) Hours As Needed for Wheezing., Disp: 8 g, Rfl: 3  •  aspirin 81 MG chewable tablet, Chew 81 mg Daily., Disp: , Rfl:   •  atorvastatin (LIPITOR) 20 MG tablet, Take 1 tablet by mouth Daily., Disp: 90 tablet, Rfl: 1  •  azelastine (ASTELIN) 0.1 % nasal spray, SPRAY 2 SPRAYS IN EACH NOSTRIL TWO TIMES A DAY, Disp: 30 mL, Rfl: 2  •  bumetanide (BUMEX) 1 MG tablet, TAKE ONE TABLET BY MOUTH DAILY, Disp: 90 tablet, Rfl: 1  •  citalopram (CeleXA) 20 MG tablet, Take 1 tablet by mouth Daily., Disp: 90 tablet, Rfl: 1  •  fluticasone (FLONASE) 50 MCG/ACT nasal spray, 2 sprays into the nostril(s) as directed by provider Daily., Disp: 16 mL, Rfl: 2  •  metoprolol tartrate (LOPRESSOR) 50 MG tablet, Take 1 tablet by mouth 2 (Two) Times a Day With Meals., Disp: 180 tablet, Rfl: 1  •  omeprazole (priLOSEC) 40 MG capsule, Take 1 capsule by mouth Every Morning Before Breakfast., Disp: 90 capsule, Rfl: 1  •  Ped Multivitamins-Fl-Iron (MULTIVITAMIN WITH FLUORIDE/IRON) 0.25-10 MG/ML solution solution, Take  by mouth Daily., Disp: , Rfl:   •  potassium chloride (K-DUR,KLOR-CON) 20 MEQ CR tablet, Take 1 tablet by mouth Daily., Disp: 90 tablet, Rfl: 1  •  roflumilast (DALIRESP) 500 MCG tablet tablet, Take 500 mcg by mouth Daily., Disp: , Rfl:   •  traZODone (DESYREL) 100 MG tablet, Take 2 tablets by mouth Every Night., Disp: 180 tablet, Rfl: 1  •   Kan[place]urphy Ellipta 200-62.5-25 MCG/ACT aerosol powder , , Disp: , Rfl:     Allergies: Sulfa antibiotics    Physical Exam  Constitutional:       General: She is not in acute distress.     Appearance: She is not ill-appearing or toxic-appearing.   HENT:      Head: Normocephalic and atraumatic.   Cardiovascular:      Rate and Rhythm: Normal rate and regular rhythm.      Heart sounds: No murmur heard.  Pulmonary:      Effort: Pulmonary effort is normal. No respiratory distress.   Musculoskeletal:      Right lower leg: Edema ( Pitting +1) present.      Left lower leg: Edema ( Pitting +1) present.   Neurological:      General: No focal deficit present.      Mental Status: She is alert and oriented to person, place, and time.   Psychiatric:         Mood and Affect: Mood normal.         Thought Content: Thought content normal.          Result Review :                   Assessment and Plan    Diagnoses and all orders for this visit:    1. Acute on chronic congestive heart failure, unspecified heart failure type (HCC) (Primary)    2. Shortness of breath  -     POCT SARS-CoV-2 Antigen CURRY + Flu    3. Bilateral lower extremity edema    COVID and flu negative.  Likely mild CHF exacerbation.  Will increase Bumex from 1 mg to 1.5 mg daily for the next 3 days.  If patient does not have good diuresis recommend follow-up in the office or to contact her cardiologist for further evaluation.      Follow Up   No follow-ups on file.  Patient was given instructions and counseling regarding her condition or for health maintenance advice. Please see specific information pulled into the AVS if appropriate.     Carmella Barboza,

## 2023-02-06 RX ORDER — AZELASTINE HYDROCHLORIDE 137 UG/1
SPRAY, METERED NASAL
Qty: 30 ML | Refills: 2 | Status: SHIPPED | OUTPATIENT
Start: 2023-02-06

## 2023-02-10 RX ORDER — AZELASTINE HYDROCHLORIDE 137 UG/1
SPRAY, METERED NASAL
Qty: 30 ML | Refills: 2 | OUTPATIENT
Start: 2023-02-10

## 2023-03-01 ENCOUNTER — LAB (OUTPATIENT)
Dept: FAMILY MEDICINE CLINIC | Facility: CLINIC | Age: 73
End: 2023-03-01
Payer: MEDICARE

## 2023-03-01 DIAGNOSIS — E78.2 MIXED HYPERLIPIDEMIA: ICD-10-CM

## 2023-03-01 DIAGNOSIS — I10 ESSENTIAL HYPERTENSION: ICD-10-CM

## 2023-03-01 DIAGNOSIS — I50.9 ACUTE ON CHRONIC CONGESTIVE HEART FAILURE, UNSPECIFIED HEART FAILURE TYPE: Primary | ICD-10-CM

## 2023-03-01 PROCEDURE — 36415 COLL VENOUS BLD VENIPUNCTURE: CPT | Performed by: STUDENT IN AN ORGANIZED HEALTH CARE EDUCATION/TRAINING PROGRAM

## 2023-03-02 LAB
ALBUMIN SERPL-MCNC: 3.3 G/DL (ref 3.7–4.7)
ALBUMIN/GLOB SERPL: 1.4 {RATIO} (ref 1.2–2.2)
ALP SERPL-CCNC: 246 IU/L (ref 44–121)
ALT SERPL-CCNC: 43 IU/L (ref 0–32)
AST SERPL-CCNC: 103 IU/L (ref 0–40)
BASOPHILS # BLD AUTO: 0 X10E3/UL (ref 0–0.2)
BASOPHILS NFR BLD AUTO: 0 %
BILIRUB SERPL-MCNC: 1.2 MG/DL (ref 0–1.2)
BUN SERPL-MCNC: 2 MG/DL (ref 8–27)
BUN/CREAT SERPL: 3 (ref 12–28)
CALCIUM SERPL-MCNC: 9.2 MG/DL (ref 8.7–10.3)
CHLORIDE SERPL-SCNC: 97 MMOL/L (ref 96–106)
CHOLEST SERPL-MCNC: 137 MG/DL (ref 100–199)
CO2 SERPL-SCNC: 30 MMOL/L (ref 20–29)
CREAT SERPL-MCNC: 0.76 MG/DL (ref 0.57–1)
EGFRCR SERPLBLD CKD-EPI 2021: 83 ML/MIN/1.73
EOSINOPHIL # BLD AUTO: 0.4 X10E3/UL (ref 0–0.4)
EOSINOPHIL NFR BLD AUTO: 6 %
ERYTHROCYTE [DISTWIDTH] IN BLOOD BY AUTOMATED COUNT: 12.6 % (ref 11.7–15.4)
GLOBULIN SER CALC-MCNC: 2.4 G/DL (ref 1.5–4.5)
GLUCOSE SERPL-MCNC: 105 MG/DL (ref 70–99)
HCT VFR BLD AUTO: 44.5 % (ref 34–46.6)
HDLC SERPL-MCNC: 66 MG/DL
HGB BLD-MCNC: 15.2 G/DL (ref 11.1–15.9)
IMM GRANULOCYTES # BLD AUTO: 0 X10E3/UL (ref 0–0.1)
IMM GRANULOCYTES NFR BLD AUTO: 0 %
LDLC SERPL CALC-MCNC: 51 MG/DL (ref 0–99)
LYMPHOCYTES # BLD AUTO: 1.2 X10E3/UL (ref 0.7–3.1)
LYMPHOCYTES NFR BLD AUTO: 20 %
MCH RBC QN AUTO: 32.3 PG (ref 26.6–33)
MCHC RBC AUTO-ENTMCNC: 34.2 G/DL (ref 31.5–35.7)
MCV RBC AUTO: 95 FL (ref 79–97)
MONOCYTES # BLD AUTO: 0.7 X10E3/UL (ref 0.1–0.9)
MONOCYTES NFR BLD AUTO: 11 %
NEUTROPHILS # BLD AUTO: 3.8 X10E3/UL (ref 1.4–7)
NEUTROPHILS NFR BLD AUTO: 63 %
PLATELET # BLD AUTO: 173 X10E3/UL (ref 150–450)
POTASSIUM SERPL-SCNC: 3.7 MMOL/L (ref 3.5–5.2)
PROT SERPL-MCNC: 5.7 G/DL (ref 6–8.5)
RBC # BLD AUTO: 4.7 X10E6/UL (ref 3.77–5.28)
SODIUM SERPL-SCNC: 141 MMOL/L (ref 134–144)
T4 FREE SERPL-MCNC: 1.62 NG/DL (ref 0.82–1.77)
TRIGL SERPL-MCNC: 115 MG/DL (ref 0–149)
TSH SERPL DL<=0.005 MIU/L-ACNC: 2.42 UIU/ML (ref 0.45–4.5)
VLDLC SERPL CALC-MCNC: 20 MG/DL (ref 5–40)
WBC # BLD AUTO: 6.1 X10E3/UL (ref 3.4–10.8)

## 2023-03-07 ENCOUNTER — OFFICE VISIT (OUTPATIENT)
Dept: FAMILY MEDICINE CLINIC | Facility: CLINIC | Age: 73
End: 2023-03-07
Payer: MEDICARE

## 2023-03-07 VITALS
HEIGHT: 67 IN | SYSTOLIC BLOOD PRESSURE: 108 MMHG | HEART RATE: 64 BPM | BODY MASS INDEX: 30.45 KG/M2 | OXYGEN SATURATION: 95 % | WEIGHT: 194 LBS | DIASTOLIC BLOOD PRESSURE: 70 MMHG

## 2023-03-07 DIAGNOSIS — I10 ESSENTIAL HYPERTENSION: ICD-10-CM

## 2023-03-07 DIAGNOSIS — R94.5 ABNORMAL RESULTS OF LIVER FUNCTION STUDIES: ICD-10-CM

## 2023-03-07 DIAGNOSIS — R74.9 ABNORMAL SERUM ENZYME LEVEL, UNSPECIFIED: ICD-10-CM

## 2023-03-07 DIAGNOSIS — R79.89 ELEVATED LIVER FUNCTION TESTS: Primary | ICD-10-CM

## 2023-03-07 DIAGNOSIS — F33.1 MAJOR DEPRESSIVE DISORDER, RECURRENT, MODERATE: ICD-10-CM

## 2023-03-07 PROCEDURE — 99214 OFFICE O/P EST MOD 30 MIN: CPT | Performed by: STUDENT IN AN ORGANIZED HEALTH CARE EDUCATION/TRAINING PROGRAM

## 2023-03-07 PROCEDURE — 36415 COLL VENOUS BLD VENIPUNCTURE: CPT | Performed by: STUDENT IN AN ORGANIZED HEALTH CARE EDUCATION/TRAINING PROGRAM

## 2023-03-07 RX ORDER — ROFLUMILAST 250 UG/1
TABLET ORAL
COMMUNITY
Start: 2023-01-03 | End: 2023-03-22

## 2023-03-07 NOTE — PROGRESS NOTES
"Chief Complaint  Results    Subjective          Laura Hui presents to [facility]  History of Present Illness    Patient is here for 6 month follow up. She states that she is overall doing well at this time. She states that she has been feeling much better then she was at her last visit.     She is here for discussion of her blood work as well.     She does not need any medication refills at this time.     Blood pressure has been dong well and she is tolerating her medications without side effects.     Depression is doing well and she is stable on medications. She has no side effects of the meds.         Objective   Vital Signs:   /70 (BP Location: Left arm, Patient Position: Sitting, Cuff Size: Adult)   Pulse 64   Ht 170.2 cm (67\")   Wt 88 kg (194 lb)   SpO2 95%   BMI 30.38 kg/m²     Body mass index is 30.38 kg/m².    Review of Systems    Past History:  Medical History: has a past medical history of Acid reflux, Anemia, Apnea, Arthritis, Backache, CHF (congestive heart failure) (HCC), COPD (chronic obstructive pulmonary disease) (HCC), Degeneration of lumbar intervertebral disc, Drug therapy, Emphysema, unspecified (HCC), Esophageal reflux, Family history of malignant neoplasm of breast in first degree relative, Gall stones, GERD (gastroesophageal reflux disease), H/O spinal fusion, H/O: drug dependency (HCC), Hearing loss, Heart disease, Hypertrophic cardiomyopathy (HCC), Insomnia, Mixed hyperlipidemia, Neurotic Depression, Recurrent major depressive episodes, moderate (HCC), Severe obesity (HCC), and Tobacco use.   Surgical History: has a past surgical history that includes Cardiac surgery; Back surgery; Neck surgery; Cholecystectomy; laparoscopic tubal ligation; orthopedic surgery; Hernia repair; Back surgery; Brain surgery; and Appendectomy.   Family History: family history includes Arthritis in her maternal grandmother; Breast cancer in her mother; Cancer in an other " family member; Heart attack in her maternal grandmother; Multiple sclerosis in her mother; Thyroid disease in her mother.   Social History: reports that she quit smoking about 14 years ago. Her smoking use included cigarettes. She has never used smokeless tobacco. She reports that she does not drink alcohol and does not use drugs.      Current Outpatient Medications:   •  albuterol sulfate  (90 Base) MCG/ACT inhaler, Inhale 2 puffs Every 4 (Four) Hours As Needed for Wheezing., Disp: 8 g, Rfl: 3  •  aspirin 81 MG chewable tablet, Chew 81 mg Daily., Disp: , Rfl:   •  atorvastatin (LIPITOR) 20 MG tablet, Take 1 tablet by mouth Daily., Disp: 90 tablet, Rfl: 1  •  Azelastine HCl 137 MCG/SPRAY solution, SPRAY 2 SPRAYS IN EACH NOSTRIL TWO TIMES A DAY, Disp: 30 mL, Rfl: 2  •  bumetanide (BUMEX) 1 MG tablet, TAKE ONE TABLET BY MOUTH DAILY, Disp: 90 tablet, Rfl: 1  •  citalopram (CeleXA) 20 MG tablet, Take 1 tablet by mouth Daily., Disp: 90 tablet, Rfl: 1  •  Daliresp 250 MCG tablet tablet, , Disp: , Rfl:   •  fluticasone (FLONASE) 50 MCG/ACT nasal spray, 2 sprays into the nostril(s) as directed by provider Daily., Disp: 16 mL, Rfl: 2  •  metoprolol tartrate (LOPRESSOR) 50 MG tablet, Take 1 tablet by mouth 2 (Two) Times a Day With Meals., Disp: 180 tablet, Rfl: 1  •  omeprazole (priLOSEC) 40 MG capsule, Take 1 capsule by mouth Every Morning Before Breakfast., Disp: 90 capsule, Rfl: 1  •  Ped Multivitamins-Fl-Iron (MULTIVITAMIN WITH FLUORIDE/IRON) 0.25-10 MG/ML solution solution, Take  by mouth Daily., Disp: , Rfl:   •  potassium chloride (K-DUR,KLOR-CON) 20 MEQ CR tablet, Take 1 tablet by mouth Daily., Disp: 90 tablet, Rfl: 1  •  traZODone (DESYREL) 100 MG tablet, Take 2 tablets by mouth Every Night., Disp: 180 tablet, Rfl: 1  •  Trelegy Ellipta 200-62.5-25 MCG/ACT aerosol powder , , Disp: , Rfl:     Allergies: Sulfa antibiotics    Physical Exam  Constitutional:       General: She is not in acute distress.      Appearance: She is not ill-appearing or toxic-appearing.   HENT:      Head: Normocephalic and atraumatic.   Cardiovascular:      Rate and Rhythm: Normal rate and regular rhythm.      Heart sounds: No murmur heard.  Pulmonary:      Effort: Pulmonary effort is normal. No respiratory distress.   Neurological:      General: No focal deficit present.      Mental Status: She is alert and oriented to person, place, and time.   Psychiatric:         Mood and Affect: Mood normal.         Thought Content: Thought content normal.          Result Review :                   Assessment and Plan    Diagnoses and all orders for this visit:    1. Elevated liver function tests (Primary)  -     Gamma GT; Future  -     Protime-INR; Future  -     CK; Future  -     Comprehensive Metabolic Panel; Future  -     Gamma GT  -     Protime-INR  -     CK  -     Comprehensive Metabolic Panel    2. Abnormal serum enzyme level, unspecified  -     Gamma GT; Future  -     Gamma GT    3. Abnormal results of liver function studies  -     Protime-INR; Future  -     Protime-INR    4. Major depressive disorder, recurrent, moderate (HCC)    5. Essential hypertension    Medications stable, and she is doing better at this time. Will do blood work as she has had some elevation of her liver function testing. She has no abdominal pain and has not had any changes of medications. Will contact with blood work results and if her blood pressure is elevated will send for ultrasound of the liver and discontinue the atorvastatin to see if her liver function could be elevated because of this.         Follow Up   No follow-ups on file.  Patient was given instructions and counseling regarding her condition or for health maintenance advice. Please see specific information pulled into the AVS if appropriate.     Carmella Barboza, DO

## 2023-03-08 LAB
ALBUMIN SERPL-MCNC: 3.2 G/DL (ref 3.7–4.7)
ALBUMIN/GLOB SERPL: 1.3 {RATIO} (ref 1.2–2.2)
ALP SERPL-CCNC: 287 IU/L (ref 44–121)
ALT SERPL-CCNC: 23 IU/L (ref 0–32)
AST SERPL-CCNC: 56 IU/L (ref 0–40)
BILIRUB SERPL-MCNC: 1.3 MG/DL (ref 0–1.2)
BUN SERPL-MCNC: 4 MG/DL (ref 8–27)
BUN/CREAT SERPL: 5 (ref 12–28)
CALCIUM SERPL-MCNC: 9 MG/DL (ref 8.7–10.3)
CHLORIDE SERPL-SCNC: 99 MMOL/L (ref 96–106)
CK SERPL-CCNC: 29 U/L (ref 32–182)
CO2 SERPL-SCNC: 29 MMOL/L (ref 20–29)
CREAT SERPL-MCNC: 0.76 MG/DL (ref 0.57–1)
EGFRCR SERPLBLD CKD-EPI 2021: 83 ML/MIN/1.73
GGT SERPL-CCNC: 351 IU/L (ref 0–60)
GLOBULIN SER CALC-MCNC: 2.4 G/DL (ref 1.5–4.5)
GLUCOSE SERPL-MCNC: 70 MG/DL (ref 70–99)
INR PPP: 1.1 (ref 0.9–1.2)
POTASSIUM SERPL-SCNC: 3.9 MMOL/L (ref 3.5–5.2)
PROT SERPL-MCNC: 5.6 G/DL (ref 6–8.5)
PROTHROMBIN TIME: 11.5 SEC (ref 9.1–12)
SODIUM SERPL-SCNC: 141 MMOL/L (ref 134–144)

## 2023-03-22 RX ORDER — FLUTICASONE PROPIONATE 50 MCG
SPRAY, SUSPENSION (ML) NASAL
Qty: 16 ML | Refills: 2 | Status: SHIPPED | OUTPATIENT
Start: 2023-03-22

## 2023-03-22 RX ORDER — ROFLUMILAST 250 UG/1
TABLET ORAL
Qty: 84 TABLET | Refills: 1 | Status: SHIPPED | OUTPATIENT
Start: 2023-03-22

## 2023-03-22 RX ORDER — OMEPRAZOLE 40 MG/1
CAPSULE, DELAYED RELEASE ORAL
Qty: 90 CAPSULE | Refills: 1 | Status: SHIPPED | OUTPATIENT
Start: 2023-03-22

## 2023-04-10 RX ORDER — CITALOPRAM 20 MG/1
TABLET ORAL
Qty: 90 TABLET | Refills: 1 | Status: SHIPPED | OUTPATIENT
Start: 2023-04-10

## 2023-04-17 RX ORDER — TRAZODONE HYDROCHLORIDE 100 MG/1
TABLET ORAL
Qty: 180 TABLET | Refills: 1 | Status: SHIPPED | OUTPATIENT
Start: 2023-04-17

## 2023-04-28 RX ORDER — BUMETANIDE 1 MG/1
TABLET ORAL
Qty: 90 TABLET | Refills: 1 | Status: SHIPPED | OUTPATIENT
Start: 2023-04-28

## 2023-05-09 RX ORDER — FLUTICASONE FUROATE, UMECLIDINIUM BROMIDE AND VILANTEROL TRIFENATATE 100; 62.5; 25 UG/1; UG/1; UG/1
POWDER RESPIRATORY (INHALATION)
Qty: 180 EACH | Refills: 0 | OUTPATIENT
Start: 2023-05-09

## 2023-05-10 RX ORDER — METOPROLOL TARTRATE 50 MG/1
TABLET, FILM COATED ORAL
Qty: 180 TABLET | Refills: 1 | Status: SHIPPED | OUTPATIENT
Start: 2023-05-10

## 2023-05-16 NOTE — TELEPHONE ENCOUNTER
Caller: Laura Hui    Relationship: Self    Best call back number: [phone]    Requested Prescriptions:   Requested Prescriptions     Pending Prescriptions Disp Refills   • metoprolol tartrate (LOPRESSOR) 50 MG tablet 180 tablet 1     Sig: Take 1 tablet by mouth 2 (Two) Times a Day With Meals.        Pharmacy where request should be sent: [facility] [id number] [address] - [phone] [facility] [phone]      Last office visit with prescribing clinician: 3/7/2023   Last telemedicine visit with prescribing clinician: 5/7/2023   Next office visit with prescribing clinician: 6/7/2023     Additional details provided by patient: PATIENTS MEDICATION WAS SENT TO THE WRONG PHARMACY. PLEASE SEND TO THE ONE LISTED ABOVE.     Does the patient have less than a 3 day supply:  [x] Yes  [] No    Would you like a call back once the refill request has been completed: [x] Yes [] No    If the office needs to give you a call back, can they leave a voicemail: [x] Yes [] No    Maulik Díaz Rep   05/16/23 14:41 EDT

## 2023-05-17 RX ORDER — METOPROLOL TARTRATE 50 MG/1
50 TABLET, FILM COATED ORAL 2 TIMES DAILY WITH MEALS
Qty: 180 TABLET | Refills: 1 | Status: SHIPPED | OUTPATIENT
Start: 2023-05-17

## 2023-05-31 DIAGNOSIS — R79.89 ELEVATED LIVER FUNCTION TESTS: Primary | ICD-10-CM

## 2023-05-31 DIAGNOSIS — E78.2 MIXED HYPERLIPIDEMIA: ICD-10-CM

## 2023-05-31 DIAGNOSIS — I10 ESSENTIAL HYPERTENSION: ICD-10-CM

## 2023-05-31 DIAGNOSIS — I50.9 ACUTE ON CHRONIC CONGESTIVE HEART FAILURE, UNSPECIFIED HEART FAILURE TYPE: ICD-10-CM

## 2023-06-19 RX ORDER — POTASSIUM CHLORIDE 1500 MG/1
TABLET, EXTENDED RELEASE ORAL
Qty: 90 TABLET | Refills: 1 | Status: SHIPPED | OUTPATIENT
Start: 2023-06-19

## 2023-07-24 ENCOUNTER — TELEPHONE (OUTPATIENT)
Dept: FAMILY MEDICINE CLINIC | Facility: CLINIC | Age: 73
End: 2023-07-24
Payer: MEDICARE

## 2023-07-24 RX ORDER — BUMETANIDE 1 MG/1
1 TABLET ORAL DAILY
Qty: 90 TABLET | Refills: 1 | Status: SHIPPED | OUTPATIENT
Start: 2023-07-24

## 2023-07-24 NOTE — TELEPHONE ENCOUNTER
Pt called, said they need a refill for the Bumetanide, she's been taking more per requested but she has run out quicker, her last dose will be taken today.

## 2023-07-25 ENCOUNTER — LAB (OUTPATIENT)
Dept: FAMILY MEDICINE CLINIC | Facility: CLINIC | Age: 73
End: 2023-07-25
Payer: MEDICARE

## 2023-07-26 LAB
ALBUMIN SERPL-MCNC: 2.7 G/DL (ref 3.8–4.8)
ALBUMIN/GLOB SERPL: 1 {RATIO} (ref 1.2–2.2)
ALP SERPL-CCNC: 172 IU/L (ref 44–121)
ALT SERPL-CCNC: 19 IU/L (ref 0–32)
AST SERPL-CCNC: 32 IU/L (ref 0–40)
BASOPHILS # BLD AUTO: 0 X10E3/UL (ref 0–0.2)
BASOPHILS NFR BLD AUTO: 0 %
BILIRUB SERPL-MCNC: 1.6 MG/DL (ref 0–1.2)
BUN SERPL-MCNC: 7 MG/DL (ref 8–27)
BUN/CREAT SERPL: 8 (ref 12–28)
CALCIUM SERPL-MCNC: 8.7 MG/DL (ref 8.7–10.3)
CHLORIDE SERPL-SCNC: 99 MMOL/L (ref 96–106)
CHOLEST SERPL-MCNC: 195 MG/DL (ref 100–199)
CO2 SERPL-SCNC: 21 MMOL/L (ref 20–29)
CREAT SERPL-MCNC: 0.89 MG/DL (ref 0.57–1)
EGFRCR SERPLBLD CKD-EPI 2021: 68 ML/MIN/1.73
EOSINOPHIL # BLD AUTO: 0.1 X10E3/UL (ref 0–0.4)
EOSINOPHIL NFR BLD AUTO: 1 %
ERYTHROCYTE [DISTWIDTH] IN BLOOD BY AUTOMATED COUNT: 12.8 % (ref 11.7–15.4)
GLOBULIN SER CALC-MCNC: 2.7 G/DL (ref 1.5–4.5)
GLUCOSE SERPL-MCNC: 83 MG/DL (ref 70–99)
HBA1C MFR BLD: 4.7 % (ref 4.8–5.6)
HCT VFR BLD AUTO: 43 % (ref 34–46.6)
HDLC SERPL-MCNC: 57 MG/DL
HGB BLD-MCNC: 14.9 G/DL (ref 11.1–15.9)
IMM GRANULOCYTES # BLD AUTO: 0 X10E3/UL (ref 0–0.1)
IMM GRANULOCYTES NFR BLD AUTO: 0 %
LDLC SERPL CALC-MCNC: 116 MG/DL (ref 0–99)
LYMPHOCYTES # BLD AUTO: 1.1 X10E3/UL (ref 0.7–3.1)
LYMPHOCYTES NFR BLD AUTO: 20 %
MCH RBC QN AUTO: 33.6 PG (ref 26.6–33)
MCHC RBC AUTO-ENTMCNC: 34.7 G/DL (ref 31.5–35.7)
MCV RBC AUTO: 97 FL (ref 79–97)
MONOCYTES # BLD AUTO: 0.6 X10E3/UL (ref 0.1–0.9)
MONOCYTES NFR BLD AUTO: 11 %
NEUTROPHILS # BLD AUTO: 3.9 X10E3/UL (ref 1.4–7)
NEUTROPHILS NFR BLD AUTO: 68 %
PLATELET # BLD AUTO: 159 X10E3/UL (ref 150–450)
POTASSIUM SERPL-SCNC: 3.5 MMOL/L (ref 3.5–5.2)
PROT SERPL-MCNC: 5.4 G/DL (ref 6–8.5)
RBC # BLD AUTO: 4.44 X10E6/UL (ref 3.77–5.28)
SODIUM SERPL-SCNC: 135 MMOL/L (ref 134–144)
T4 FREE SERPL-MCNC: 1.77 NG/DL (ref 0.82–1.77)
TRIGL SERPL-MCNC: 123 MG/DL (ref 0–149)
TSH SERPL DL<=0.005 MIU/L-ACNC: 1.96 UIU/ML (ref 0.45–4.5)
VLDLC SERPL CALC-MCNC: 22 MG/DL (ref 5–40)
WBC # BLD AUTO: 5.7 X10E3/UL (ref 3.4–10.8)

## 2023-07-28 ENCOUNTER — TELEPHONE (OUTPATIENT)
Dept: FAMILY MEDICINE CLINIC | Facility: CLINIC | Age: 73
End: 2023-07-28
Payer: MEDICARE

## 2023-09-06 RX ORDER — ROFLUMILAST 250 UG/1
TABLET ORAL
Qty: 56 TABLET | Refills: 0 | Status: SHIPPED | OUTPATIENT
Start: 2023-09-06

## 2023-09-07 ENCOUNTER — TELEPHONE (OUTPATIENT)
Dept: FAMILY MEDICINE CLINIC | Facility: CLINIC | Age: 73
End: 2023-09-07
Payer: MEDICARE

## 2023-09-07 NOTE — TELEPHONE ENCOUNTER
"  Caller: Laura Hui    Relationship to patient: Self    Best call back number: [phone]    Patient is needing: PATIENT STATED THAT SHE HAS TO CANCEL APPOINTMENT BECAUSE SHE IS NOT ABLE TO COME INTO OFFICE DUE TO NOT FINDING SOMEONE TO COME WATCH LADY SHE LIVES WITH SO PATIENT COULD LEAVE AND PATIENT STATED THAT HER FEET AND LEGS HAVE SO MUCH FLUID BUILT UP THAT THEY FEEL LIKE \"TONS OF BRICKS\"    PATIENT STATED THAT SHE WOULD HAVE TO CALL 911 LATER THIS EVENING TO HAVE AMBULANCE TAKE HER TO [facility] WHEN SHE HAS SOMEONE TO COME WATCH ROOMMATE AND JUST WANTED TO LET PCP KNOW           "

## 2023-09-14 ENCOUNTER — TELEPHONE (OUTPATIENT)
Dept: FAMILY MEDICINE CLINIC | Facility: CLINIC | Age: 73
End: 2023-09-14
Payer: MEDICARE

## 2023-09-14 NOTE — TELEPHONE ENCOUNTER
Luisa with [facility]# [phone] called to verify   DR Tamra would follow patient and send verbal orders for Skilled Nursing two times a week for the next four weeks, then once a week after that, and also PT evaluation on Monday September 18, 2023.

## 2023-09-15 ENCOUNTER — TELEPHONE (OUTPATIENT)
Dept: FAMILY MEDICINE CLINIC | Facility: CLINIC | Age: 73
End: 2023-09-15
Payer: MEDICARE

## 2023-09-15 NOTE — TELEPHONE ENCOUNTER
Will discuss at her face to face visit. I can not send in a wheelchair and get it approved without this.

## 2023-09-15 NOTE — TELEPHONE ENCOUNTER
Caller: [facility]    Relationship:     Best call back number: [phone]     What orders are you requesting (i.e. lab or imaging): WHEELCHAIR    In what timeframe would the patient need to come in:     Where will you receive your lab/imaging services: [facility] OR [facility]

## 2023-09-19 RX ORDER — OMEPRAZOLE 40 MG/1
CAPSULE, DELAYED RELEASE ORAL
Qty: 90 CAPSULE | Refills: 1 | Status: SHIPPED | OUTPATIENT
Start: 2023-09-19 | End: 2023-09-25

## 2023-09-21 ENCOUNTER — TELEPHONE (OUTPATIENT)
Dept: FAMILY MEDICINE CLINIC | Facility: CLINIC | Age: 73
End: 2023-09-21

## 2023-09-21 NOTE — TELEPHONE ENCOUNTER
Caller: VERNON CRISTOBAL [facility]    Relationship: HOME HEALTH PT    Best call back number: [phone]     What orders are you requesting (i.e. lab or imaging): HOME HEALTH PHYSICAL THERAPY    Where will you receive your lab/imaging services: [facility]    Additional notes: PLEASE CALL WITH VERBAL ORDERS FOR PHYSICAL THERAPY TWO TIMES PER WEEK FOR TWO WEEKS AND ONE TIME PER WEEK FOR FOUR WEEKS.

## 2023-09-22 NOTE — TELEPHONE ENCOUNTER
Noted. Will evaluated her legs on Monday. If there is worsening over the weekend she needs to be evaluated in the ED.

## 2023-09-22 NOTE — TELEPHONE ENCOUNTER
SANDRA,The nurse called back and said pt's legs are leaking fluid, she just noticed but it seems like it's been going on for a while. Her swelling is down and her weight is stable, her BP is 100/60 today and she's not short of breath or anything. They will continue to monitor over the weekend and she will be here for her appt on Monday.

## 2023-09-25 ENCOUNTER — OFFICE VISIT (OUTPATIENT)
Dept: FAMILY MEDICINE CLINIC | Facility: CLINIC | Age: 73
End: 2023-09-25

## 2023-09-25 VITALS
OXYGEN SATURATION: 98 % | DIASTOLIC BLOOD PRESSURE: 58 MMHG | HEART RATE: 68 BPM | HEIGHT: 67 IN | WEIGHT: 173 LBS | BODY MASS INDEX: 27.15 KG/M2 | SYSTOLIC BLOOD PRESSURE: 100 MMHG

## 2023-09-25 DIAGNOSIS — R60.0 BILATERAL LOWER EXTREMITY EDEMA: ICD-10-CM

## 2023-09-25 DIAGNOSIS — I50.33 ACUTE ON CHRONIC HEART FAILURE WITH PRESERVED EJECTION FRACTION: ICD-10-CM

## 2023-09-25 DIAGNOSIS — L03.115 CELLULITIS OF RIGHT LOWER LEG: Primary | ICD-10-CM

## 2023-09-25 RX ORDER — POTASSIUM CHLORIDE 750 MG/1
TABLET, FILM COATED, EXTENDED RELEASE ORAL
COMMUNITY
Start: 2023-09-11

## 2023-09-25 RX ORDER — CEPHALEXIN 500 MG/1
500 CAPSULE ORAL 2 TIMES DAILY
Qty: 14 CAPSULE | Refills: 0 | Status: SHIPPED | OUTPATIENT
Start: 2023-09-25

## 2023-09-25 RX ORDER — ATORVASTATIN CALCIUM 40 MG
40 TABLET ORAL
COMMUNITY
Start: 2023-09-11 | End: 2023-09-25 | Stop reason: SDUPTHER

## 2023-09-25 RX ORDER — ATORVASTATIN CALCIUM 40 MG/1
40 TABLET, FILM COATED ORAL NIGHTLY
Qty: 90 TABLET | Refills: 1 | Status: SHIPPED | OUTPATIENT
Start: 2023-09-25

## 2023-09-25 RX ORDER — PANTOPRAZOLE SODIUM 40 MG/1
40 TABLET, DELAYED RELEASE ORAL DAILY
Qty: 90 TABLET | Refills: 1 | Status: SHIPPED | OUTPATIENT
Start: 2023-09-25

## 2023-09-25 RX ORDER — FLUTICASONE FUROATE, UMECLIDINIUM BROMIDE AND VILANTEROL TRIFENATATE 100; 62.5; 25 UG/1; UG/1; UG/1
POWDER RESPIRATORY (INHALATION)
COMMUNITY
Start: 2023-08-21

## 2023-09-25 NOTE — PROGRESS NOTES
"Chief Complaint  Hospital Follow Up Visit    Subjective          Laura Hiu presents to [facility]  History of Present Illness    Patient is being seen today for hospital follow up. TCM call was not made prior to patient being evaluated in the office today.     [facility] and discharged on 9/11/2023 for acute decompensated congestive heart failure.  Patient was evaluated by cardiology in the hospital and had echo performed which showed EF of 70 to 75% with normal wall motion and grade 1 diastolic dysfunction.  Patient had significant diuretic response with bump of creatinine to 1.3.  She was also found to have a type II non-STEMI, and troponins were trended to improvement.  She was chest pain-free upon discharge and was advised to continue her aspirin, beta-blocker, and statin.      Patient states that while she was originally doing better she continues to have weakness and had a mild increase of swelling especially in the right lower leg.  She states that the leg has been painful started to get mildly red this morning.  She denies any burning or itching.    She has not followed up with cardiology since her discharge from the hospital as she has not seen her normal cardiologist in about 2-1/2 years.  She would like to be referred to someone within the [facility] if possible.        Objective   Vital Signs:   /58   Pulse 68   Ht 170.2 cm (67\")   Wt 78.5 kg (173 lb)   SpO2 98%   BMI 27.10 kg/m²     Body mass index is 27.1 kg/m².    Review of Systems    Past History:  Medical History: has a past medical history of Acid reflux, Anemia, Apnea, Arthritis, Backache, CHF (congestive heart failure), COPD (chronic obstructive pulmonary disease), Degeneration of lumbar intervertebral disc, Drug therapy, Emphysema, unspecified, Esophageal reflux, Family history of malignant neoplasm of breast in first degree relative, Gall " stones, GERD (gastroesophageal reflux disease), H/O spinal fusion, H/O: drug dependency, Hearing loss, Heart disease, Hypertrophic cardiomyopathy, Insomnia, Mixed hyperlipidemia, Neurotic Depression, Recurrent major depressive episodes, moderate, Severe obesity, and Tobacco use.   Surgical History: has a past surgical history that includes Cardiac surgery; Back surgery; Neck surgery; Cholecystectomy; laparoscopic tubal ligation; orthopedic surgery; Hernia repair; Back surgery; Brain surgery; and Appendectomy.   Family History: family history includes Arthritis in her maternal grandmother; Breast cancer in her mother; Cancer in an other family member; Heart attack in her maternal grandmother; Multiple sclerosis in her mother; Thyroid disease in her mother.   Social History: reports that she quit smoking about 15 years ago. Her smoking use included cigarettes. She has never used smokeless tobacco. She reports that she does not drink alcohol and does not use drugs.      Current Outpatient Medications:     atorvastatin (Lipitor) 40 MG tablet, Take 1 tablet by mouth Every Night., Disp: 90 tablet, Rfl: 1    potassium chloride 10 MEQ CR tablet, , Disp: , Rfl:     Trelegy Ellipta 100-62.5-25 MCG/ACT inhaler, , Disp: , Rfl:     albuterol sulfate  (90 Base) MCG/ACT inhaler, Inhale 2 puffs Every 4 (Four) Hours As Needed for Wheezing., Disp: 8 g, Rfl: 3    aspirin 81 MG chewable tablet, Chew 81 mg Daily., Disp: , Rfl:     Azelastine HCl 137 MCG/SPRAY solution, SPRAY 2 SPRAYS IN EACH NOSTRIL TWO TIMES A DAY, Disp: 30 mL, Rfl: 2    bumetanide (BUMEX) 1 MG tablet, Take 1 tablet by mouth Daily., Disp: 90 tablet, Rfl: 1    cephalexin (Keflex) 500 MG capsule, Take 1 capsule by mouth 2 (Two) Times a Day., Disp: 14 capsule, Rfl: 0    citalopram (CeleXA) 20 MG tablet, TAKE ONE TABLET BY MOUTH DAILY, Disp: 90 tablet, Rfl: 1    Daliresp 250 MCG tablet tablet, TAKE ONE TABLET BY MOUTH DAILY, Disp: 56 tablet, Rfl: 0    fluticasone  (FLONASE) 50 MCG/ACT nasal spray, SPRAY TWO SPRAYS IN EACH NOSTRIL ONCE DAILY, Disp: 16 mL, Rfl: 2    KLOR-CON 20 MEQ CR tablet, TAKE ONE TABLET BY MOUTH DAILY, Disp: 90 tablet, Rfl: 1    metoprolol tartrate (LOPRESSOR) 50 MG tablet, Take 1 tablet by mouth 2 (Two) Times a Day With Meals., Disp: 180 tablet, Rfl: 1    pantoprazole (Protonix) 40 MG EC tablet, Take 1 tablet by mouth Daily., Disp: 90 tablet, Rfl: 1    Ped Multivitamins-Fl-Iron (MULTIVITAMIN WITH FLUORIDE/IRON) 0.25-10 MG/ML solution solution, Take  by mouth Daily., Disp: , Rfl:     traZODone (DESYREL) 100 MG tablet, TAKE TWO TABLETS BY MOUTH ONCE NIGHTLY, Disp: 180 tablet, Rfl: 1    Allergies: Sulfa antibiotics    Physical Exam  Constitutional:       General: She is not in acute distress.     Appearance: She is not ill-appearing or toxic-appearing.   HENT:      Head: Normocephalic and atraumatic.   Cardiovascular:      Rate and Rhythm: Normal rate and regular rhythm.      Heart sounds: No murmur heard.  Pulmonary:      Effort: Pulmonary effort is normal. No respiratory distress.   Musculoskeletal:      Right lower leg: Edema (+1 bilaterally.  Right lower extremity with erythema and scabbing noted of the skin.) present.      Left lower leg: Edema present.   Neurological:      General: No focal deficit present.      Mental Status: She is alert and oriented to person, place, and time.   Psychiatric:         Mood and Affect: Mood normal.         Thought Content: Thought content normal.        Result Review :                   Assessment and Plan    Diagnoses and all orders for this visit:    1. Cellulitis of right lower leg (Primary)    2. Bilateral lower extremity edema  -     Comprehensive Metabolic Panel  -     CBC & Differential  -     Ambulatory Referral to Cardiology    3. Acute on chronic heart failure with preserved ejection fraction  -     Comprehensive Metabolic Panel  -     CBC & Differential  -     Ambulatory Referral to Cardiology    Other  orders  -     atorvastatin (Lipitor) 40 MG tablet; Take 1 tablet by mouth Every Night.  Dispense: 90 tablet; Refill: 1  -     cephalexin (Keflex) 500 MG capsule; Take 1 capsule by mouth 2 (Two) Times a Day.  Dispense: 14 capsule; Refill: 0  -     pantoprazole (Protonix) 40 MG EC tablet; Take 1 tablet by mouth Daily.  Dispense: 90 tablet; Refill: 1    Patient with cellulitis which is likely related to increased bilateral lower extremity edema.  Will treat with Keflex at this time.  Call with any new or worsening symptoms regarding this.    Blood work ordered today to follow-up on anemia as well as elevated creatinine from previous.    Referral made to cardiology within the [facility] system for continued monitoring of possible hypertrophic cardiomyopathy and HFpEF.         Follow Up   No follow-ups on file.  Patient was given instructions and counseling regarding her condition or for health maintenance advice. Please see specific information pulled into the AVS if appropriate.     Carmella Barboza, DO

## 2023-09-26 ENCOUNTER — TELEPHONE (OUTPATIENT)
Dept: FAMILY MEDICINE CLINIC | Facility: CLINIC | Age: 73
End: 2023-09-26
Payer: MEDICARE

## 2023-09-26 LAB
ALBUMIN SERPL-MCNC: 2.5 G/DL (ref 3.8–4.8)
ALBUMIN/GLOB SERPL: 0.8 {RATIO} (ref 1.2–2.2)
ALP SERPL-CCNC: 263 IU/L (ref 44–121)
ALT SERPL-CCNC: 27 IU/L (ref 0–32)
AST SERPL-CCNC: 57 IU/L (ref 0–40)
BASOPHILS # BLD AUTO: 0 X10E3/UL (ref 0–0.2)
BASOPHILS NFR BLD AUTO: 0 %
BILIRUB SERPL-MCNC: 1.6 MG/DL (ref 0–1.2)
BUN SERPL-MCNC: 13 MG/DL (ref 8–27)
BUN/CREAT SERPL: 9 (ref 12–28)
CALCIUM SERPL-MCNC: 9.2 MG/DL (ref 8.7–10.3)
CHLORIDE SERPL-SCNC: 96 MMOL/L (ref 96–106)
CO2 SERPL-SCNC: 20 MMOL/L (ref 20–29)
CREAT SERPL-MCNC: 1.45 MG/DL (ref 0.57–1)
EGFRCR SERPLBLD CKD-EPI 2021: 38 ML/MIN/1.73
EOSINOPHIL # BLD AUTO: 0 X10E3/UL (ref 0–0.4)
EOSINOPHIL NFR BLD AUTO: 0 %
ERYTHROCYTE [DISTWIDTH] IN BLOOD BY AUTOMATED COUNT: 13.5 % (ref 11.7–15.4)
GLOBULIN SER CALC-MCNC: 3 G/DL (ref 1.5–4.5)
GLUCOSE SERPL-MCNC: 93 MG/DL (ref 70–99)
HCT VFR BLD AUTO: 42.3 % (ref 34–46.6)
HGB BLD-MCNC: 15.2 G/DL (ref 11.1–15.9)
IMM GRANULOCYTES # BLD AUTO: 0.1 X10E3/UL (ref 0–0.1)
IMM GRANULOCYTES NFR BLD AUTO: 1 %
LYMPHOCYTES # BLD AUTO: 0.6 X10E3/UL (ref 0.7–3.1)
LYMPHOCYTES NFR BLD AUTO: 3 %
MCH RBC QN AUTO: 32.5 PG (ref 26.6–33)
MCHC RBC AUTO-ENTMCNC: 35.9 G/DL (ref 31.5–35.7)
MCV RBC AUTO: 91 FL (ref 79–97)
MONOCYTES # BLD AUTO: 1.4 X10E3/UL (ref 0.1–0.9)
MONOCYTES NFR BLD AUTO: 8 %
NEUTROPHILS # BLD AUTO: 15.9 X10E3/UL (ref 1.4–7)
NEUTROPHILS NFR BLD AUTO: 88 %
PLATELET # BLD AUTO: 226 X10E3/UL (ref 150–450)
POTASSIUM SERPL-SCNC: 4.3 MMOL/L (ref 3.5–5.2)
PROT SERPL-MCNC: 5.5 G/DL (ref 6–8.5)
RBC # BLD AUTO: 4.67 X10E6/UL (ref 3.77–5.28)
SODIUM SERPL-SCNC: 134 MMOL/L (ref 134–144)
WBC # BLD AUTO: 18 X10E3/UL (ref 3.4–10.8)

## 2023-09-26 NOTE — TELEPHONE ENCOUNTER
HOME HEALTH CALLED FOR VERBAL AUTH FOR PHYSICAL THERAPY, 2X PER WEEK FOR 2 WEEKS, FOLLOW BY 1X PER WEEK FOR 4 WEEKS. VERBAL AUTH GIVEN, WILL SEND PAPERWORK FOR SIGNATURE

## 2023-10-03 ENCOUNTER — APPOINTMENT (OUTPATIENT)
Dept: CT IMAGING | Facility: HOSPITAL | Age: 73
DRG: 432 | End: 2023-10-03
Payer: MEDICARE

## 2023-10-03 ENCOUNTER — APPOINTMENT (OUTPATIENT)
Dept: GENERAL RADIOLOGY | Facility: HOSPITAL | Age: 73
DRG: 432 | End: 2023-10-03
Payer: MEDICARE

## 2023-10-03 ENCOUNTER — HOSPITAL ENCOUNTER (INPATIENT)
Facility: HOSPITAL | Age: 73
LOS: 10 days | Discharge: HOSPICE | DRG: 432 | End: 2023-10-13
Attending: EMERGENCY MEDICINE | Admitting: HOSPITALIST
Payer: MEDICARE

## 2023-10-03 DIAGNOSIS — E87.1 HYPONATREMIA: ICD-10-CM

## 2023-10-03 DIAGNOSIS — R09.02 HYPOXIA: ICD-10-CM

## 2023-10-03 DIAGNOSIS — J90 BILATERAL PLEURAL EFFUSION: ICD-10-CM

## 2023-10-03 DIAGNOSIS — J18.9 COMMUNITY ACQUIRED PNEUMONIA, UNSPECIFIED LATERALITY: Primary | ICD-10-CM

## 2023-10-03 DIAGNOSIS — R53.1 GENERALIZED WEAKNESS: ICD-10-CM

## 2023-10-03 DIAGNOSIS — R18.8 CIRRHOSIS OF LIVER WITH ASCITES, UNSPECIFIED HEPATIC CIRRHOSIS TYPE: ICD-10-CM

## 2023-10-03 DIAGNOSIS — K74.60 CIRRHOSIS OF LIVER WITH ASCITES, UNSPECIFIED HEPATIC CIRRHOSIS TYPE: ICD-10-CM

## 2023-10-03 DIAGNOSIS — N28.9 RENAL INSUFFICIENCY: ICD-10-CM

## 2023-10-03 PROBLEM — I10 HYPERTENSION: Status: ACTIVE | Noted: 2022-11-03

## 2023-10-03 PROBLEM — I70.8 LEFT SUBCLAVIAN ARTERY OCCLUSION: Status: ACTIVE | Noted: 2023-10-03

## 2023-10-03 PROBLEM — I50.9 CHF (CONGESTIVE HEART FAILURE): Status: ACTIVE | Noted: 2023-10-03

## 2023-10-03 PROBLEM — F41.9 ANXIETY AND DEPRESSION: Status: ACTIVE | Noted: 2023-10-03

## 2023-10-03 PROBLEM — N17.9 AKI (ACUTE KIDNEY INJURY): Status: ACTIVE | Noted: 2023-10-03

## 2023-10-03 PROBLEM — G89.29 CHRONIC BACK PAIN: Status: ACTIVE | Noted: 2023-10-03

## 2023-10-03 PROBLEM — J96.01 ACUTE RESPIRATORY FAILURE WITH HYPOXIA: Status: ACTIVE | Noted: 2023-10-03

## 2023-10-03 PROBLEM — D64.9 ANEMIA: Status: ACTIVE | Noted: 2023-10-03

## 2023-10-03 PROBLEM — M54.9 CHRONIC BACK PAIN: Status: ACTIVE | Noted: 2023-10-03

## 2023-10-03 PROBLEM — E78.5 HYPERLIPIDEMIA: Status: ACTIVE | Noted: 2021-05-10

## 2023-10-03 PROBLEM — K21.9 GASTROESOPHAGEAL REFLUX DISEASE: Status: ACTIVE | Noted: 2022-11-03

## 2023-10-03 PROBLEM — J44.9 CHRONIC OBSTRUCTIVE PULMONARY DISEASE: Chronic | Status: ACTIVE | Noted: 2021-08-23

## 2023-10-03 PROBLEM — F32.A ANXIETY AND DEPRESSION: Status: ACTIVE | Noted: 2023-10-03

## 2023-10-03 PROBLEM — L03.115 CELLULITIS OF RIGHT LOWER EXTREMITY: Status: ACTIVE | Noted: 2023-10-03

## 2023-10-03 LAB
ALBUMIN SERPL-MCNC: 2.3 G/DL (ref 3.5–5.2)
ALBUMIN/GLOB SERPL: 0.7 G/DL
ALP SERPL-CCNC: 262 U/L (ref 39–117)
ALT SERPL W P-5'-P-CCNC: 17 U/L (ref 1–33)
ANION GAP SERPL CALCULATED.3IONS-SCNC: 13 MMOL/L (ref 5–15)
ARTERIAL PATENCY WRIST A: ABNORMAL
AST SERPL-CCNC: 71 U/L (ref 1–32)
ATMOSPHERIC PRESS: ABNORMAL MM[HG]
B PARAPERT DNA SPEC QL NAA+PROBE: NOT DETECTED
B PERT DNA SPEC QL NAA+PROBE: NOT DETECTED
BASE EXCESS BLDA CALC-SCNC: -4.4 MMOL/L (ref 0–2)
BASOPHILS # BLD AUTO: 0.01 10*3/MM3 (ref 0–0.2)
BASOPHILS NFR BLD AUTO: 0.1 % (ref 0–1.5)
BDY SITE: ABNORMAL
BILIRUB SERPL-MCNC: 1.1 MG/DL (ref 0–1.2)
BILIRUB UR QL STRIP: NEGATIVE
BODY TEMPERATURE: 37 C
BUN SERPL-MCNC: 21 MG/DL (ref 8–23)
BUN/CREAT SERPL: 14.9 (ref 7–25)
C PNEUM DNA NPH QL NAA+NON-PROBE: NOT DETECTED
CALCIUM SPEC-SCNC: 9.1 MG/DL (ref 8.6–10.5)
CHLORIDE SERPL-SCNC: 96 MMOL/L (ref 98–107)
CK SERPL-CCNC: 290 U/L (ref 20–180)
CLARITY UR: CLEAR
CO2 BLDA-SCNC: 18.8 MMOL/L (ref 22–33)
CO2 SERPL-SCNC: 19 MMOL/L (ref 22–29)
COHGB MFR BLD: 0.9 % (ref 0–2)
COLOR UR: ABNORMAL
CREAT SERPL-MCNC: 1.41 MG/DL (ref 0.57–1)
D DIMER PPP FEU-MCNC: 2.84 MCGFEU/ML (ref 0–0.73)
D-LACTATE SERPL-SCNC: 1.9 MMOL/L (ref 0.5–2)
DEPRECATED RDW RBC AUTO: 55.8 FL (ref 37–54)
EGFRCR SERPLBLD CKD-EPI 2021: 39.5 ML/MIN/1.73
EOSINOPHIL # BLD AUTO: 0.02 10*3/MM3 (ref 0–0.4)
EOSINOPHIL NFR BLD AUTO: 0.2 % (ref 0.3–6.2)
EPAP: 0
ERYTHROCYTE [DISTWIDTH] IN BLOOD BY AUTOMATED COUNT: 15.9 % (ref 12.3–15.4)
FLUAV SUBTYP SPEC NAA+PROBE: NOT DETECTED
FLUBV RNA ISLT QL NAA+PROBE: NOT DETECTED
GEN 5 2HR TROPONIN T REFLEX: 74 NG/L
GLOBULIN UR ELPH-MCNC: 3.2 GM/DL
GLUCOSE SERPL-MCNC: 91 MG/DL (ref 65–99)
GLUCOSE UR STRIP-MCNC: NEGATIVE MG/DL
HADV DNA SPEC NAA+PROBE: NOT DETECTED
HCO3 BLDA-SCNC: 18 MMOL/L (ref 20–26)
HCOV 229E RNA SPEC QL NAA+PROBE: NOT DETECTED
HCOV HKU1 RNA SPEC QL NAA+PROBE: NOT DETECTED
HCOV NL63 RNA SPEC QL NAA+PROBE: NOT DETECTED
HCOV OC43 RNA SPEC QL NAA+PROBE: NOT DETECTED
HCT VFR BLD AUTO: 42.2 % (ref 34–46.6)
HCT VFR BLD CALC: 38.9 % (ref 38–51)
HGB BLD-MCNC: 14.6 G/DL (ref 12–15.9)
HGB BLDA-MCNC: 12.7 G/DL (ref 14–18)
HGB UR QL STRIP.AUTO: NEGATIVE
HMPV RNA NPH QL NAA+NON-PROBE: NOT DETECTED
HPIV1 RNA ISLT QL NAA+PROBE: NOT DETECTED
HPIV2 RNA SPEC QL NAA+PROBE: NOT DETECTED
HPIV3 RNA NPH QL NAA+PROBE: NOT DETECTED
HPIV4 P GENE NPH QL NAA+PROBE: NOT DETECTED
IMM GRANULOCYTES # BLD AUTO: 0.05 10*3/MM3 (ref 0–0.05)
IMM GRANULOCYTES NFR BLD AUTO: 0.5 % (ref 0–0.5)
INHALED O2 CONCENTRATION: 32 %
INR PPP: 1.19 (ref 0.89–1.12)
IPAP: 0
KETONES UR QL STRIP: NEGATIVE
LEUKOCYTE ESTERASE UR QL STRIP.AUTO: NEGATIVE
LYMPHOCYTES # BLD AUTO: 0.77 10*3/MM3 (ref 0.7–3.1)
LYMPHOCYTES NFR BLD AUTO: 7 % (ref 19.6–45.3)
M PNEUMO IGG SER IA-ACNC: NOT DETECTED
MAGNESIUM SERPL-MCNC: 1.6 MG/DL (ref 1.6–2.4)
MAGNESIUM SERPL-MCNC: 2 MG/DL (ref 1.6–2.4)
MCH RBC QN AUTO: 32.7 PG (ref 26.6–33)
MCHC RBC AUTO-ENTMCNC: 34.6 G/DL (ref 31.5–35.7)
MCV RBC AUTO: 94.4 FL (ref 79–97)
METHGB BLD QL: 0.5 % (ref 0–1.5)
MODALITY: ABNORMAL
MONOCYTES # BLD AUTO: 0.8 10*3/MM3 (ref 0.1–0.9)
MONOCYTES NFR BLD AUTO: 7.3 % (ref 5–12)
NEUTROPHILS NFR BLD AUTO: 84.9 % (ref 42.7–76)
NEUTROPHILS NFR BLD AUTO: 9.28 10*3/MM3 (ref 1.7–7)
NITRITE UR QL STRIP: NEGATIVE
NRBC BLD AUTO-RTO: 0 /100 WBC (ref 0–0.2)
NT-PROBNP SERPL-MCNC: ABNORMAL PG/ML (ref 0–900)
OSMOLALITY SERPL: 282 MOSM/KG (ref 275–295)
OXYHGB MFR BLDV: 95.6 % (ref 94–99)
PAW @ PEAK INSP FLOW SETTING VENT: 0 CMH2O
PCO2 BLDA: 25.7 MM HG (ref 35–45)
PCO2 TEMP ADJ BLD: 25.7 MM HG (ref 35–45)
PH BLDA: 7.45 PH UNITS (ref 7.35–7.45)
PH UR STRIP.AUTO: <=5 [PH] (ref 5–8)
PH, TEMP CORRECTED: 7.45 PH UNITS
PLATELET # BLD AUTO: 213 10*3/MM3 (ref 140–450)
PMV BLD AUTO: 9.2 FL (ref 6–12)
PO2 BLDA: 82.6 MM HG (ref 83–108)
PO2 TEMP ADJ BLD: 82.6 MM HG (ref 83–108)
POTASSIUM SERPL-SCNC: 5 MMOL/L (ref 3.5–5.2)
PROCALCITONIN SERPL-MCNC: 0.97 NG/ML (ref 0–0.25)
PROT SERPL-MCNC: 5.5 G/DL (ref 6–8.5)
PROT UR QL STRIP: NEGATIVE
PROTHROMBIN TIME: 15.2 SECONDS (ref 12.2–14.5)
RBC # BLD AUTO: 4.47 10*6/MM3 (ref 3.77–5.28)
RHINOVIRUS RNA SPEC NAA+PROBE: NOT DETECTED
RSV RNA NPH QL NAA+NON-PROBE: NOT DETECTED
SARS-COV-2 RNA NPH QL NAA+NON-PROBE: NOT DETECTED
SODIUM SERPL-SCNC: 128 MMOL/L (ref 136–145)
SP GR UR STRIP: 1.02 (ref 1–1.03)
T4 FREE SERPL-MCNC: 1.7 NG/DL (ref 0.93–1.7)
TOTAL RATE: 0 BREATHS/MINUTE
TROPONIN T DELTA: -6 NG/L
TROPONIN T SERPL HS-MCNC: 77 NG/L
TROPONIN T SERPL HS-MCNC: 80 NG/L
TSH SERPL DL<=0.05 MIU/L-ACNC: 4.92 UIU/ML (ref 0.27–4.2)
UROBILINOGEN UR QL STRIP: ABNORMAL
WBC NRBC COR # BLD: 10.93 10*3/MM3 (ref 3.4–10.8)

## 2023-10-03 PROCEDURE — 82550 ASSAY OF CK (CPK): CPT | Performed by: EMERGENCY MEDICINE

## 2023-10-03 PROCEDURE — 83935 ASSAY OF URINE OSMOLALITY: CPT | Performed by: PHYSICIAN ASSISTANT

## 2023-10-03 PROCEDURE — P9612 CATHETERIZE FOR URINE SPEC: HCPCS

## 2023-10-03 PROCEDURE — 83880 ASSAY OF NATRIURETIC PEPTIDE: CPT | Performed by: EMERGENCY MEDICINE

## 2023-10-03 PROCEDURE — 25510000001 IOPAMIDOL PER 1 ML: Performed by: EMERGENCY MEDICINE

## 2023-10-03 PROCEDURE — 81003 URINALYSIS AUTO W/O SCOPE: CPT | Performed by: EMERGENCY MEDICINE

## 2023-10-03 PROCEDURE — 83605 ASSAY OF LACTIC ACID: CPT | Performed by: EMERGENCY MEDICINE

## 2023-10-03 PROCEDURE — 93005 ELECTROCARDIOGRAM TRACING: CPT | Performed by: EMERGENCY MEDICINE

## 2023-10-03 PROCEDURE — 0202U NFCT DS 22 TRGT SARS-COV-2: CPT | Performed by: EMERGENCY MEDICINE

## 2023-10-03 PROCEDURE — 83050 HGB METHEMOGLOBIN QUAN: CPT

## 2023-10-03 PROCEDURE — 83930 ASSAY OF BLOOD OSMOLALITY: CPT | Performed by: PHYSICIAN ASSISTANT

## 2023-10-03 PROCEDURE — 85025 COMPLETE CBC W/AUTO DIFF WBC: CPT | Performed by: EMERGENCY MEDICINE

## 2023-10-03 PROCEDURE — 36415 COLL VENOUS BLD VENIPUNCTURE: CPT

## 2023-10-03 PROCEDURE — 85379 FIBRIN DEGRADATION QUANT: CPT | Performed by: EMERGENCY MEDICINE

## 2023-10-03 PROCEDURE — 25010000002 ALBUMIN HUMAN 25% PER 50 ML: Performed by: PHYSICIAN ASSISTANT

## 2023-10-03 PROCEDURE — 71275 CT ANGIOGRAPHY CHEST: CPT

## 2023-10-03 PROCEDURE — 87040 BLOOD CULTURE FOR BACTERIA: CPT | Performed by: EMERGENCY MEDICINE

## 2023-10-03 PROCEDURE — 85610 PROTHROMBIN TIME: CPT | Performed by: EMERGENCY MEDICINE

## 2023-10-03 PROCEDURE — 84439 ASSAY OF FREE THYROXINE: CPT | Performed by: EMERGENCY MEDICINE

## 2023-10-03 PROCEDURE — 84484 ASSAY OF TROPONIN QUANT: CPT | Performed by: EMERGENCY MEDICINE

## 2023-10-03 PROCEDURE — 84145 PROCALCITONIN (PCT): CPT | Performed by: EMERGENCY MEDICINE

## 2023-10-03 PROCEDURE — P9047 ALBUMIN (HUMAN), 25%, 50ML: HCPCS | Performed by: PHYSICIAN ASSISTANT

## 2023-10-03 PROCEDURE — 82805 BLOOD GASES W/O2 SATURATION: CPT

## 2023-10-03 PROCEDURE — 84484 ASSAY OF TROPONIN QUANT: CPT | Performed by: PHYSICIAN ASSISTANT

## 2023-10-03 PROCEDURE — 80053 COMPREHEN METABOLIC PANEL: CPT | Performed by: EMERGENCY MEDICINE

## 2023-10-03 PROCEDURE — 84443 ASSAY THYROID STIM HORMONE: CPT | Performed by: EMERGENCY MEDICINE

## 2023-10-03 PROCEDURE — 94640 AIRWAY INHALATION TREATMENT: CPT

## 2023-10-03 PROCEDURE — 25010000002 CEFTRIAXONE PER 250 MG: Performed by: PHYSICIAN ASSISTANT

## 2023-10-03 PROCEDURE — 25010000002 HEPARIN (PORCINE) PER 1000 UNITS: Performed by: PHYSICIAN ASSISTANT

## 2023-10-03 PROCEDURE — 87641 MR-STAPH DNA AMP PROBE: CPT | Performed by: PHYSICIAN ASSISTANT

## 2023-10-03 PROCEDURE — 83735 ASSAY OF MAGNESIUM: CPT | Performed by: PHYSICIAN ASSISTANT

## 2023-10-03 PROCEDURE — 25810000003 SODIUM CHLORIDE 0.9 % SOLUTION: Performed by: EMERGENCY MEDICINE

## 2023-10-03 PROCEDURE — 36600 WITHDRAWAL OF ARTERIAL BLOOD: CPT

## 2023-10-03 PROCEDURE — 83735 ASSAY OF MAGNESIUM: CPT | Performed by: EMERGENCY MEDICINE

## 2023-10-03 PROCEDURE — 84300 ASSAY OF URINE SODIUM: CPT | Performed by: PHYSICIAN ASSISTANT

## 2023-10-03 PROCEDURE — 94799 UNLISTED PULMONARY SVC/PX: CPT

## 2023-10-03 PROCEDURE — 99285 EMERGENCY DEPT VISIT HI MDM: CPT

## 2023-10-03 PROCEDURE — 70450 CT HEAD/BRAIN W/O DYE: CPT

## 2023-10-03 PROCEDURE — 82375 ASSAY CARBOXYHB QUANT: CPT

## 2023-10-03 PROCEDURE — 71045 X-RAY EXAM CHEST 1 VIEW: CPT

## 2023-10-03 RX ORDER — ATORVASTATIN CALCIUM 40 MG/1
40 TABLET, FILM COATED ORAL NIGHTLY
Status: DISCONTINUED | OUTPATIENT
Start: 2023-10-03 | End: 2023-10-13 | Stop reason: HOSPADM

## 2023-10-03 RX ORDER — BUMETANIDE 1 MG/1
1 TABLET ORAL 2 TIMES DAILY
Status: DISCONTINUED | OUTPATIENT
Start: 2023-10-03 | End: 2023-10-03

## 2023-10-03 RX ORDER — CITALOPRAM 20 MG/1
20 TABLET ORAL DAILY
Status: DISCONTINUED | OUTPATIENT
Start: 2023-10-04 | End: 2023-10-13 | Stop reason: HOSPADM

## 2023-10-03 RX ORDER — BISACODYL 10 MG
10 SUPPOSITORY, RECTAL RECTAL DAILY PRN
Status: DISCONTINUED | OUTPATIENT
Start: 2023-10-03 | End: 2023-10-13 | Stop reason: HOSPADM

## 2023-10-03 RX ORDER — PANTOPRAZOLE SODIUM 40 MG/1
40 TABLET, DELAYED RELEASE ORAL DAILY
Status: DISCONTINUED | OUTPATIENT
Start: 2023-10-04 | End: 2023-10-13 | Stop reason: HOSPADM

## 2023-10-03 RX ORDER — ALBUTEROL SULFATE 2.5 MG/3ML
2.5 SOLUTION RESPIRATORY (INHALATION) EVERY 4 HOURS PRN
COMMUNITY

## 2023-10-03 RX ORDER — METOPROLOL TARTRATE 50 MG/1
50 TABLET, FILM COATED ORAL 2 TIMES DAILY WITH MEALS
Status: DISCONTINUED | OUTPATIENT
Start: 2023-10-03 | End: 2023-10-04

## 2023-10-03 RX ORDER — BISACODYL 5 MG/1
5 TABLET, DELAYED RELEASE ORAL DAILY PRN
Status: DISCONTINUED | OUTPATIENT
Start: 2023-10-03 | End: 2023-10-13 | Stop reason: HOSPADM

## 2023-10-03 RX ORDER — SODIUM CHLORIDE 9 MG/ML
40 INJECTION, SOLUTION INTRAVENOUS AS NEEDED
Status: DISCONTINUED | OUTPATIENT
Start: 2023-10-03 | End: 2023-10-13 | Stop reason: HOSPADM

## 2023-10-03 RX ORDER — AMOXICILLIN 250 MG
2 CAPSULE ORAL 2 TIMES DAILY
Status: DISCONTINUED | OUTPATIENT
Start: 2023-10-03 | End: 2023-10-13 | Stop reason: HOSPADM

## 2023-10-03 RX ORDER — ACETAMINOPHEN 325 MG/1
650 TABLET ORAL EVERY 4 HOURS PRN
Status: DISCONTINUED | OUTPATIENT
Start: 2023-10-03 | End: 2023-10-13 | Stop reason: HOSPADM

## 2023-10-03 RX ORDER — BUMETANIDE 1 MG/1
1 TABLET ORAL 2 TIMES DAILY
Status: ON HOLD | COMMUNITY
End: 2023-10-13 | Stop reason: SDUPTHER

## 2023-10-03 RX ORDER — BUDESONIDE AND FORMOTEROL FUMARATE DIHYDRATE 160; 4.5 UG/1; UG/1
2 AEROSOL RESPIRATORY (INHALATION)
Status: DISCONTINUED | OUTPATIENT
Start: 2023-10-03 | End: 2023-10-13 | Stop reason: HOSPADM

## 2023-10-03 RX ORDER — CHOLECALCIFEROL (VITAMIN D3) 125 MCG
5 CAPSULE ORAL NIGHTLY PRN
Status: DISCONTINUED | OUTPATIENT
Start: 2023-10-03 | End: 2023-10-13 | Stop reason: HOSPADM

## 2023-10-03 RX ORDER — ROFLUMILAST 500 UG/1
250 TABLET ORAL DAILY
Status: DISCONTINUED | OUTPATIENT
Start: 2023-10-04 | End: 2023-10-13 | Stop reason: HOSPADM

## 2023-10-03 RX ORDER — ASPIRIN 81 MG/1
81 TABLET, CHEWABLE ORAL DAILY
Status: DISCONTINUED | OUTPATIENT
Start: 2023-10-04 | End: 2023-10-13 | Stop reason: HOSPADM

## 2023-10-03 RX ORDER — BUMETANIDE 1 MG/1
1 TABLET ORAL 2 TIMES DAILY
Status: DISCONTINUED | OUTPATIENT
Start: 2023-10-04 | End: 2023-10-04

## 2023-10-03 RX ORDER — HEPARIN SODIUM 5000 [USP'U]/ML
5000 INJECTION, SOLUTION INTRAVENOUS; SUBCUTANEOUS EVERY 12 HOURS SCHEDULED
Status: DISCONTINUED | OUTPATIENT
Start: 2023-10-03 | End: 2023-10-13 | Stop reason: HOSPADM

## 2023-10-03 RX ORDER — ALBUMIN (HUMAN) 12.5 G/50ML
25 SOLUTION INTRAVENOUS ONCE
Status: COMPLETED | OUTPATIENT
Start: 2023-10-03 | End: 2023-10-03

## 2023-10-03 RX ORDER — SODIUM CHLORIDE 0.9 % (FLUSH) 0.9 %
10 SYRINGE (ML) INJECTION AS NEEDED
Status: DISCONTINUED | OUTPATIENT
Start: 2023-10-03 | End: 2023-10-13 | Stop reason: HOSPADM

## 2023-10-03 RX ORDER — POLYETHYLENE GLYCOL 3350 17 G/17G
17 POWDER, FOR SOLUTION ORAL DAILY PRN
Status: DISCONTINUED | OUTPATIENT
Start: 2023-10-03 | End: 2023-10-13 | Stop reason: HOSPADM

## 2023-10-03 RX ORDER — SODIUM CHLORIDE 0.9 % (FLUSH) 0.9 %
10 SYRINGE (ML) INJECTION EVERY 12 HOURS SCHEDULED
Status: DISCONTINUED | OUTPATIENT
Start: 2023-10-03 | End: 2023-10-13 | Stop reason: HOSPADM

## 2023-10-03 RX ORDER — SACCHAROMYCES BOULARDII 250 MG
250 CAPSULE ORAL 2 TIMES DAILY
Status: DISCONTINUED | OUTPATIENT
Start: 2023-10-03 | End: 2023-10-13 | Stop reason: HOSPADM

## 2023-10-03 RX ORDER — BUMETANIDE 0.25 MG/ML
1 INJECTION INTRAMUSCULAR; INTRAVENOUS ONCE
Status: DISCONTINUED | OUTPATIENT
Start: 2023-10-03 | End: 2023-10-03

## 2023-10-03 RX ADMIN — ALBUMIN (HUMAN) 25 G: 0.25 INJECTION, SOLUTION INTRAVENOUS at 22:45

## 2023-10-03 RX ADMIN — IOPAMIDOL 85 ML: 755 INJECTION, SOLUTION INTRAVENOUS at 19:09

## 2023-10-03 RX ADMIN — Medication 250 MG: at 22:54

## 2023-10-03 RX ADMIN — SODIUM CHLORIDE 500 ML: 9 INJECTION, SOLUTION INTRAVENOUS at 18:44

## 2023-10-03 RX ADMIN — TRAZODONE HYDROCHLORIDE 150 MG: 50 TABLET ORAL at 22:53

## 2023-10-03 RX ADMIN — DOXYCYCLINE 100 MG: 100 INJECTION, POWDER, LYOPHILIZED, FOR SOLUTION INTRAVENOUS at 18:44

## 2023-10-03 RX ADMIN — HEPARIN SODIUM 5000 UNITS: 5000 INJECTION, SOLUTION INTRAVENOUS; SUBCUTANEOUS at 22:54

## 2023-10-03 RX ADMIN — Medication 10 ML: at 22:54

## 2023-10-03 RX ADMIN — ATORVASTATIN CALCIUM 40 MG: 40 TABLET, FILM COATED ORAL at 22:54

## 2023-10-03 RX ADMIN — BUDESONIDE AND FORMOTEROL FUMARATE DIHYDRATE 2 PUFF: 160; 4.5 AEROSOL RESPIRATORY (INHALATION) at 23:00

## 2023-10-03 RX ADMIN — CEFTRIAXONE 2000 MG: 2 INJECTION, POWDER, FOR SOLUTION INTRAMUSCULAR; INTRAVENOUS at 23:24

## 2023-10-03 NOTE — LETTER
EMS Transport Request  For use at [facility], and [place] only   Patient Name: Laura Hui : 1950   Weight:80.4 kg (177 lb 3.2 oz) Pick-up Location: [facility] BLS/ALS: BLS/ALS: BLS   Insurance: MEDICARE Auth End Date:    Pre-Cert #: D/C Summary complete: no   Destination: Home How many stairs 5, Will the patient be on the main level yes, Is there a ramp available no, Can the patient stand and pivot no, Address [address] [place] [address], and Name/contact number for who will be present Mary Rodríguez [phone]   Contact Precautions: None   Equipment (O2, Fluids, etc.): O2, settings 2L cont via NC   Arrive By Date/Time: Pickup after 1200 Stretcher/WC: Stretcher   CM Requesting: Jo Ann Fernandez RN Ext: [phone]   Notes/Medical Necessity: Pt is completely bed bound and unable to sit up for extended periods of time do to ascites and dyspnea.     ______________________________________________________________________    *Only 2 patient bags OR 1 carry-on size bag are permitted.  Wheelchairs and walkers CANNOT transported with the patient. Acknowledge: Yes

## 2023-10-03 NOTE — Clinical Note
Level of Care: Telemetry [5]   Diagnosis: Pneumonia [[id number]]   Certification: I Certify That Inpatient Hospital Services Are Medically Necessary For Greater Than 2 Midnights

## 2023-10-04 ENCOUNTER — APPOINTMENT (OUTPATIENT)
Dept: ULTRASOUND IMAGING | Facility: HOSPITAL | Age: 73
DRG: 432 | End: 2023-10-04
Payer: MEDICARE

## 2023-10-04 LAB
ALBUMIN FLD-MCNC: <0.2 G/DL
ALBUMIN SERPL-MCNC: 2.3 G/DL (ref 3.5–5.2)
ALBUMIN/GLOB SERPL: 1 G/DL
ALP SERPL-CCNC: 194 U/L (ref 39–117)
ALT SERPL W P-5'-P-CCNC: 19 U/L (ref 1–33)
ANION GAP SERPL CALCULATED.3IONS-SCNC: 15 MMOL/L (ref 5–15)
APPEARANCE FLD: ABNORMAL
AST SERPL-CCNC: 55 U/L (ref 1–32)
BASOPHILS # BLD AUTO: 0.02 10*3/MM3 (ref 0–0.2)
BASOPHILS NFR BLD AUTO: 0.2 % (ref 0–1.5)
BILIRUB SERPL-MCNC: 0.9 MG/DL (ref 0–1.2)
BUN SERPL-MCNC: 20 MG/DL (ref 8–23)
BUN/CREAT SERPL: 12.6 (ref 7–25)
CALCIUM SPEC-SCNC: 8.7 MG/DL (ref 8.6–10.5)
CHLORIDE SERPL-SCNC: 96 MMOL/L (ref 98–107)
CO2 SERPL-SCNC: 16 MMOL/L (ref 22–29)
COLOR FLD: YELLOW
CORTIS AM PEAK SERPL-MCNC: 24.93 MCG/DL
CREAT SERPL-MCNC: 1.59 MG/DL (ref 0.57–1)
DEPRECATED RDW RBC AUTO: 54.6 FL (ref 37–54)
EGFRCR SERPLBLD CKD-EPI 2021: 34.2 ML/MIN/1.73
EOSINOPHIL # BLD AUTO: 0.03 10*3/MM3 (ref 0–0.4)
EOSINOPHIL NFR BLD AUTO: 0.3 % (ref 0.3–6.2)
ERYTHROCYTE [DISTWIDTH] IN BLOOD BY AUTOMATED COUNT: 15.9 % (ref 12.3–15.4)
GLOBULIN UR ELPH-MCNC: 2.4 GM/DL
GLUCOSE SERPL-MCNC: 69 MG/DL (ref 65–99)
HBV SURFACE AB SER RIA-ACNC: NORMAL
HBV SURFACE AG SERPL QL IA: NORMAL
HCT VFR BLD AUTO: 34.3 % (ref 34–46.6)
HGB BLD-MCNC: 11.7 G/DL (ref 12–15.9)
IMM GRANULOCYTES # BLD AUTO: 0.04 10*3/MM3 (ref 0–0.05)
IMM GRANULOCYTES NFR BLD AUTO: 0.4 % (ref 0–0.5)
IRON 24H UR-MRATE: 50 MCG/DL (ref 37–145)
IRON SATN MFR SERPL: 35 % (ref 20–50)
LYMPHOCYTES # BLD AUTO: 0.69 10*3/MM3 (ref 0.7–3.1)
LYMPHOCYTES NFR BLD AUTO: 7.5 % (ref 19.6–45.3)
LYMPHOCYTES NFR FLD MANUAL: 41 %
MACROPHAGE FLUID: 16 %
MCH RBC QN AUTO: 32.2 PG (ref 26.6–33)
MCHC RBC AUTO-ENTMCNC: 34.1 G/DL (ref 31.5–35.7)
MCV RBC AUTO: 94.5 FL (ref 79–97)
MESOTHL CELL NFR FLD MANUAL: 8 %
MONOCYTES # BLD AUTO: 0.88 10*3/MM3 (ref 0.1–0.9)
MONOCYTES NFR BLD AUTO: 9.6 % (ref 5–12)
MONOCYTES NFR FLD: 24 %
MRSA DNA SPEC QL NAA+PROBE: NEGATIVE
NEUTROPHILS NFR BLD AUTO: 7.52 10*3/MM3 (ref 1.7–7)
NEUTROPHILS NFR BLD AUTO: 82 % (ref 42.7–76)
NEUTROPHILS NFR FLD MANUAL: 11 %
NRBC BLD AUTO-RTO: 0 /100 WBC (ref 0–0.2)
OSMOLALITY UR: 347 MOSM/KG (ref 300–1100)
PLATELET # BLD AUTO: 143 10*3/MM3 (ref 140–450)
PMV BLD AUTO: 9.6 FL (ref 6–12)
POTASSIUM SERPL-SCNC: 4.5 MMOL/L (ref 3.5–5.2)
PROT FLD-MCNC: <1 G/DL
PROT SERPL-MCNC: 4.7 G/DL (ref 6–8.5)
RBC # BLD AUTO: 3.63 10*6/MM3 (ref 3.77–5.28)
RBC # FLD AUTO: <2000 /MM3
SODIUM SERPL-SCNC: 127 MMOL/L (ref 136–145)
SODIUM UR-SCNC: <20 MMOL/L
TIBC SERPL-MCNC: 143 MCG/DL (ref 298–536)
TRANSFERRIN SERPL-MCNC: 96 MG/DL (ref 200–360)
WBC # FLD AUTO: 34 /MM3
WBC NRBC COR # BLD: 9.18 10*3/MM3 (ref 3.4–10.8)

## 2023-10-04 PROCEDURE — 25010000002 HEPARIN (PORCINE) PER 1000 UNITS: Performed by: PHYSICIAN ASSISTANT

## 2023-10-04 PROCEDURE — 86706 HEP B SURFACE ANTIBODY: CPT | Performed by: PHYSICIAN ASSISTANT

## 2023-10-04 PROCEDURE — 86235 NUCLEAR ANTIGEN ANTIBODY: CPT | Performed by: PHYSICIAN ASSISTANT

## 2023-10-04 PROCEDURE — 89051 BODY FLUID CELL COUNT: CPT | Performed by: INTERNAL MEDICINE

## 2023-10-04 PROCEDURE — 94799 UNLISTED PULMONARY SVC/PX: CPT

## 2023-10-04 PROCEDURE — 76705 ECHO EXAM OF ABDOMEN: CPT

## 2023-10-04 PROCEDURE — 85025 COMPLETE CBC W/AUTO DIFF WBC: CPT | Performed by: PHYSICIAN ASSISTANT

## 2023-10-04 PROCEDURE — 84466 ASSAY OF TRANSFERRIN: CPT | Performed by: PHYSICIAN ASSISTANT

## 2023-10-04 PROCEDURE — 86225 DNA ANTIBODY NATIVE: CPT | Performed by: PHYSICIAN ASSISTANT

## 2023-10-04 PROCEDURE — 84157 ASSAY OF PROTEIN OTHER: CPT | Performed by: INTERNAL MEDICINE

## 2023-10-04 PROCEDURE — 86381 MITOCHONDRIAL ANTIBODY EACH: CPT | Performed by: PHYSICIAN ASSISTANT

## 2023-10-04 PROCEDURE — 97161 PT EVAL LOW COMPLEX 20 MIN: CPT

## 2023-10-04 PROCEDURE — 80053 COMPREHEN METABOLIC PANEL: CPT | Performed by: PHYSICIAN ASSISTANT

## 2023-10-04 PROCEDURE — 82103 ALPHA-1-ANTITRYPSIN TOTAL: CPT | Performed by: PHYSICIAN ASSISTANT

## 2023-10-04 PROCEDURE — 94664 DEMO&/EVAL PT USE INHALER: CPT

## 2023-10-04 PROCEDURE — 86015 ACTIN ANTIBODY EACH: CPT | Performed by: PHYSICIAN ASSISTANT

## 2023-10-04 PROCEDURE — 97530 THERAPEUTIC ACTIVITIES: CPT

## 2023-10-04 PROCEDURE — 87340 HEPATITIS B SURFACE AG IA: CPT | Performed by: PHYSICIAN ASSISTANT

## 2023-10-04 PROCEDURE — 0W9G3ZX DRAINAGE OF PERITONEAL CAVITY, PERCUTANEOUS APPROACH, DIAGNOSTIC: ICD-10-PCS | Performed by: RADIOLOGY

## 2023-10-04 PROCEDURE — 99222 1ST HOSP IP/OBS MODERATE 55: CPT | Performed by: PHYSICIAN ASSISTANT

## 2023-10-04 PROCEDURE — 82533 TOTAL CORTISOL: CPT | Performed by: PHYSICIAN ASSISTANT

## 2023-10-04 PROCEDURE — 82652 VIT D 1 25-DIHYDROXY: CPT | Performed by: PHYSICIAN ASSISTANT

## 2023-10-04 PROCEDURE — P9047 ALBUMIN (HUMAN), 25%, 50ML: HCPCS | Performed by: INTERNAL MEDICINE

## 2023-10-04 PROCEDURE — 76942 ECHO GUIDE FOR BIOPSY: CPT

## 2023-10-04 PROCEDURE — 83540 ASSAY OF IRON: CPT | Performed by: PHYSICIAN ASSISTANT

## 2023-10-04 PROCEDURE — 86708 HEPATITIS A ANTIBODY: CPT | Performed by: PHYSICIAN ASSISTANT

## 2023-10-04 PROCEDURE — 25010000002 ALBUMIN HUMAN 25% PER 50 ML: Performed by: INTERNAL MEDICINE

## 2023-10-04 PROCEDURE — 82042 OTHER SOURCE ALBUMIN QUAN EA: CPT | Performed by: INTERNAL MEDICINE

## 2023-10-04 RX ORDER — LIDOCAINE HYDROCHLORIDE 10 MG/ML
5 INJECTION, SOLUTION EPIDURAL; INFILTRATION; INTRACAUDAL; PERINEURAL ONCE
Status: COMPLETED | OUTPATIENT
Start: 2023-10-04 | End: 2023-10-04

## 2023-10-04 RX ORDER — HYDROCODONE BITARTRATE AND ACETAMINOPHEN 5; 325 MG/1; MG/1
1 TABLET ORAL EVERY 6 HOURS PRN
Status: DISCONTINUED | OUTPATIENT
Start: 2023-10-04 | End: 2023-10-07

## 2023-10-04 RX ORDER — ALBUMIN (HUMAN) 12.5 G/50ML
25 SOLUTION INTRAVENOUS ONCE
Status: COMPLETED | OUTPATIENT
Start: 2023-10-04 | End: 2023-10-04

## 2023-10-04 RX ORDER — ALBUMIN (HUMAN) 12.5 G/50ML
12.5 SOLUTION INTRAVENOUS ONCE
OUTPATIENT
Start: 2023-10-04 | End: 2023-10-04

## 2023-10-04 RX ORDER — MIDODRINE HYDROCHLORIDE 5 MG/1
5 TABLET ORAL
Status: DISCONTINUED | OUTPATIENT
Start: 2023-10-04 | End: 2023-10-05

## 2023-10-04 RX ADMIN — BUDESONIDE AND FORMOTEROL FUMARATE DIHYDRATE 2 PUFF: 160; 4.5 AEROSOL RESPIRATORY (INHALATION) at 07:31

## 2023-10-04 RX ADMIN — HEPARIN SODIUM 5000 UNITS: 5000 INJECTION, SOLUTION INTRAVENOUS; SUBCUTANEOUS at 20:30

## 2023-10-04 RX ADMIN — DOXYCYCLINE 100 MG: 100 INJECTION, POWDER, LYOPHILIZED, FOR SOLUTION INTRAVENOUS at 17:18

## 2023-10-04 RX ADMIN — MIDODRINE HYDROCHLORIDE 5 MG: 5 TABLET ORAL at 17:19

## 2023-10-04 RX ADMIN — HYDROCODONE BITARTRATE AND ACETAMINOPHEN 1 TABLET: 5; 325 TABLET ORAL at 14:13

## 2023-10-04 RX ADMIN — LIDOCAINE HYDROCHLORIDE 5 ML: 10 INJECTION, SOLUTION EPIDURAL; INFILTRATION; INTRACAUDAL; PERINEURAL at 16:34

## 2023-10-04 RX ADMIN — CITALOPRAM HYDROBROMIDE 20 MG: 20 TABLET ORAL at 09:26

## 2023-10-04 RX ADMIN — ROFLUMILAST 250 MCG: 500 TABLET ORAL at 09:32

## 2023-10-04 RX ADMIN — TRAZODONE HYDROCHLORIDE 150 MG: 50 TABLET ORAL at 20:30

## 2023-10-04 RX ADMIN — Medication 10 ML: at 09:28

## 2023-10-04 RX ADMIN — Medication 250 MG: at 20:30

## 2023-10-04 RX ADMIN — TIOTROPIUM BROMIDE INHALATION SPRAY 2 PUFF: 3.12 SPRAY, METERED RESPIRATORY (INHALATION) at 07:32

## 2023-10-04 RX ADMIN — ALBUMIN (HUMAN) 25 G: 0.25 INJECTION, SOLUTION INTRAVENOUS at 15:15

## 2023-10-04 RX ADMIN — Medication 250 MG: at 09:27

## 2023-10-04 RX ADMIN — PANTOPRAZOLE SODIUM 40 MG: 40 TABLET, DELAYED RELEASE ORAL at 09:28

## 2023-10-04 RX ADMIN — HEPARIN SODIUM 5000 UNITS: 5000 INJECTION, SOLUTION INTRAVENOUS; SUBCUTANEOUS at 09:26

## 2023-10-04 RX ADMIN — Medication 5 MG: at 20:31

## 2023-10-04 RX ADMIN — ASPIRIN 81 MG CHEWABLE TABLET 81 MG: 81 TABLET CHEWABLE at 09:26

## 2023-10-04 RX ADMIN — Medication 10 ML: at 20:31

## 2023-10-04 RX ADMIN — BUDESONIDE AND FORMOTEROL FUMARATE DIHYDRATE 2 PUFF: 160; 4.5 AEROSOL RESPIRATORY (INHALATION) at 19:07

## 2023-10-04 RX ADMIN — ATORVASTATIN CALCIUM 40 MG: 40 TABLET, FILM COATED ORAL at 20:30

## 2023-10-04 RX ADMIN — DOXYCYCLINE 100 MG: 100 INJECTION, POWDER, LYOPHILIZED, FOR SOLUTION INTRAVENOUS at 05:25

## 2023-10-04 NOTE — ED PROVIDER NOTES
Subjective   History of Present Illness  73-year-old female presents for evaluation of generalized weakness.  Of note, the patient is not oxygen dependent.  She lives at home with her family.  Over the past week the patient has had increasing generalized weakness when compared to her baseline, resulting in several mechanical trip and falls.  She notes that the weakness worsened this morning to the point that she was having a hard time walking so EMS was called and the patient was brought to our facility to be evaluated.  She was noted by EMS personnel to be mildly hypoxic with oxygen saturations hovering around 90%.  She endorses a mild cough.  No fevers.  No known exposures to anyone with COVID-19.    Review of Systems   Constitutional:  Positive for fatigue.   Respiratory:  Positive for cough and shortness of breath.    Neurological:  Positive for weakness.   All other systems reviewed and are negative.    Past Medical History:   Diagnosis Date    Acid reflux     Anemia     Due to chronic blood loss - Chronic blood loss anemia    Apnea     Arthritis     Backache     CHF (congestive heart failure)     COPD (chronic obstructive pulmonary disease)     Severe    Degeneration of lumbar intervertebral disc     Drug therapy     Finding    Emphysema, unspecified     Esophageal reflux     Family history of malignant neoplasm of breast in first degree relative     Gall stones     GERD (gastroesophageal reflux disease)     H/O spinal fusion     H/O: drug dependency     Hearing loss     Heart disease     Hypertrophic cardiomyopathy     Insomnia     Mixed hyperlipidemia     Neurotic Depression     Recurrent major depressive episodes, moderate     Severe obesity     Tobacco use     History of       Allergies   Allergen Reactions    Sulfa Antibiotics Rash       Past Surgical History:   Procedure Laterality Date    APPENDECTOMY      BACK SURGERY      BACK SURGERY      CARDIAC ABLATION      CARDIAC SURGERY      Cardiac Stent  Catherization    CHOLECYSTECTOMY      Gall Bladder Surgery    HERNIA REPAIR      HIP SURGERY Left     LAPAROSCOPIC TUBAL LIGATION      NECK SURGERY      NECK SURGERY      ORTHOPEDIC SURGERY         Family History   Problem Relation Age of Onset    Breast cancer Mother     Multiple sclerosis Mother     Thyroid disease Mother     Arthritis Maternal Grandmother     Heart attack Maternal Grandmother     Cancer Other        Social History     Socioeconomic History    Marital status: Single   Tobacco Use    Smoking status: Former     Types: Cigarettes     Quit date: 7/11/2008     Years since quitting: 15.2    Smokeless tobacco: Never   Vaping Use    Vaping Use: Every day    Substances: Nicotine    Devices: Pre-filled or refillable cartridge   Substance and Sexual Activity    Alcohol use: No    Drug use: No    Sexual activity: Defer           Objective   Physical Exam  Vitals and nursing note reviewed.   Constitutional:       General: She is not in acute distress.     Appearance: She is well-developed. She is not diaphoretic.      Comments: Nontoxic-appearing elderly female   HENT:      Head: Normocephalic and atraumatic.   Eyes:      Pupils: Pupils are equal, round, and reactive to light.   Cardiovascular:      Rate and Rhythm: Normal rate and regular rhythm.      Heart sounds: Normal heart sounds. No murmur heard.    No friction rub. No gallop.   Pulmonary:      Effort: Pulmonary effort is normal. No respiratory distress.      Breath sounds: No wheezing or rales.      Comments: Decreased breath sounds noted to lung bases bilaterally  Abdominal:      General: Bowel sounds are normal. There is no distension.      Palpations: Abdomen is soft. There is no mass.      Tenderness: There is no abdominal tenderness. There is no guarding or rebound.   Musculoskeletal:         General: Normal range of motion.      Cervical back: Neck supple.   Skin:     General: Skin is warm and dry.      Findings: No erythema or rash.    Neurological:      Mental Status: She is alert and oriented to person, place, and time.   Psychiatric:         Mood and Affect: Mood normal.         Thought Content: Thought content normal.         Judgment: Judgment normal.       Procedures           ED Course  ED Course as of 10/03/23 2120   Tue Oct 03, 2023   1815 73-year-old female presents for evaluation of generalized weakness.  Of note, the patient is not oxygen dependent.  She lives at home with family.  Over the past week the patient has had increasing generalized weakness when compared to baseline, resulting in several mechanical trip and falls.  She notes that her weakness worsened this morning to the point that she was having a hard time walking so EMS was called and the patient was brought to our facility to be evaluated.  On arrival, the patient is mildly hypoxic with oxygen saturations in the low 90s on room air.  Supplemental oxygen given via nasal cannula.  Labs remarkable for mild renal insufficiency and hyponatremia.  High-sensitivity troponin is elevated.  D-dimer is elevated as well. [DD]   1816 I personally and independently viewed the patient's x-ray images myself, and I am in agreement with the radiologist's reading for final interpretation--particularly regarding pneumonia.   [DD]   1816 CAP coverage initiated with doxycycline. [DD]   1816 Given the patient's overall clinical picture, she clearly warrants admission to the hospital at this point. [DD]   1855 Respiratory viral panel is negative. [DD]   1858 Procalcitonin is mildly elevated. [DD]   1919 I personally and independently reviewed the patient's CT images and findings, and I am in agreement with the radiologist regarding CT interpretation--particularly, there is no emergent intracranial process present. [DD]   1919 After reviewing the patient's CT imaging, I discussed the patient's case with our hospitalist, Dr. Rice, and the patient will be admitted under her care for further  evaluation and treatment.  The patient is aware/agreeable with the plan at this time. [DD]      ED Course User Index  [DD] Sebastian Whelan MD                                         Recent Results (from the past 24 hour(s))   Comprehensive Metabolic Panel    Collection Time: 10/03/23  5:20 PM    Specimen: Blood   Result Value Ref Range    Glucose 91 65 - 99 mg/dL    BUN 21 8 - 23 mg/dL    Creatinine 1.41 (H) 0.57 - 1.00 mg/dL    Sodium 128 (L) 136 - 145 mmol/L    Potassium 5.0 3.5 - 5.2 mmol/L    Chloride 96 (L) 98 - 107 mmol/L    CO2 19.0 (L) 22.0 - 29.0 mmol/L    Calcium 9.1 8.6 - 10.5 mg/dL    Total Protein 5.5 (L) 6.0 - 8.5 g/dL    Albumin 2.3 (L) 3.5 - 5.2 g/dL    ALT (SGPT) 17 1 - 33 U/L    AST (SGOT) 71 (H) 1 - 32 U/L    Alkaline Phosphatase 262 (H) 39 - 117 U/L    Total Bilirubin 1.1 0.0 - 1.2 mg/dL    Globulin 3.2 gm/dL    A/G Ratio 0.7 g/dL    BUN/Creatinine Ratio 14.9 7.0 - 25.0    Anion Gap 13.0 5.0 - 15.0 mmol/L    eGFR 39.5 (L) >60.0 mL/min/1.73   Protime-INR    Collection Time: 10/03/23  5:20 PM    Specimen: Blood   Result Value Ref Range    Protime 15.2 (H) 12.2 - 14.5 Seconds    INR 1.19 (H) 0.89 - 1.12   BNP    Collection Time: 10/03/23  5:20 PM    Specimen: Blood   Result Value Ref Range    proBNP 10,567.0 (H) 0.0 - 900.0 pg/mL   Single High Sensitivity Troponin T    Collection Time: 10/03/23  5:20 PM    Specimen: Blood   Result Value Ref Range    HS Troponin T 80 (C) <10 ng/L   CK    Collection Time: 10/03/23  5:20 PM    Specimen: Blood   Result Value Ref Range    Creatine Kinase 290 (H) 20 - 180 U/L   Magnesium    Collection Time: 10/03/23  5:20 PM    Specimen: Blood   Result Value Ref Range    Magnesium 2.0 1.6 - 2.4 mg/dL   TSH    Collection Time: 10/03/23  5:20 PM    Specimen: Blood   Result Value Ref Range    TSH 4.920 (H) 0.270 - 4.200 uIU/mL   T4, Free    Collection Time: 10/03/23  5:20 PM    Specimen: Blood   Result Value Ref Range    Free T4 1.70 0.93 - 1.70 ng/dL   CBC Auto  Differential    Collection Time: 10/03/23  5:20 PM    Specimen: Blood   Result Value Ref Range    WBC 10.93 (H) 3.40 - 10.80 10*3/mm3    RBC 4.47 3.77 - 5.28 10*6/mm3    Hemoglobin 14.6 12.0 - 15.9 g/dL    Hematocrit 42.2 34.0 - 46.6 %    MCV 94.4 79.0 - 97.0 fL    MCH 32.7 26.6 - 33.0 pg    MCHC 34.6 31.5 - 35.7 g/dL    RDW 15.9 (H) 12.3 - 15.4 %    RDW-SD 55.8 (H) 37.0 - 54.0 fl    MPV 9.2 6.0 - 12.0 fL    Platelets 213 140 - 450 10*3/mm3    Neutrophil % 84.9 (H) 42.7 - 76.0 %    Lymphocyte % 7.0 (L) 19.6 - 45.3 %    Monocyte % 7.3 5.0 - 12.0 %    Eosinophil % 0.2 (L) 0.3 - 6.2 %    Basophil % 0.1 0.0 - 1.5 %    Immature Grans % 0.5 0.0 - 0.5 %    Neutrophils, Absolute 9.28 (H) 1.70 - 7.00 10*3/mm3    Lymphocytes, Absolute 0.77 0.70 - 3.10 10*3/mm3    Monocytes, Absolute 0.80 0.10 - 0.90 10*3/mm3    Eosinophils, Absolute 0.02 0.00 - 0.40 10*3/mm3    Basophils, Absolute 0.01 0.00 - 0.20 10*3/mm3    Immature Grans, Absolute 0.05 0.00 - 0.05 10*3/mm3    nRBC 0.0 0.0 - 0.2 /100 WBC   Procalcitonin    Collection Time: 10/03/23  5:20 PM    Specimen: Blood   Result Value Ref Range    Procalcitonin 0.97 (H) 0.00 - 0.25 ng/mL   D-dimer, Quantitative    Collection Time: 10/03/23  5:20 PM    Specimen: Blood   Result Value Ref Range    D-Dimer, Quantitative 2.84 (H) 0.00 - 0.73 MCGFEU/mL   ECG 12 Lead Other; gen weakness    Collection Time: 10/03/23  5:23 PM   Result Value Ref Range    QT Interval 448 ms    QTC Interval 504 ms   Respiratory Panel PCR w/COVID-19(SARS-CoV-2) JEN/BETSY/ELIEZER/PAD/COR/MAD/URSULA In-House, NP Swab in [facility], 3-4 HR TAT - Swab, Nasopharynx    Collection Time: 10/03/23  5:26 PM    Specimen: Nasopharynx; Swab   Result Value Ref Range    ADENOVIRUS, PCR Not Detected Not Detected    Coronavirus 229E Not Detected Not Detected    Coronavirus HKU1 Not Detected Not Detected    Coronavirus NL63 Not Detected Not Detected    Coronavirus OC43 Not Detected Not Detected    COVID19 Not Detected Not Detected - Ref. Range     Human Metapneumovirus Not Detected Not Detected    Human Rhinovirus/Enterovirus Not Detected Not Detected    Influenza A PCR Not Detected Not Detected    Influenza B PCR Not Detected Not Detected    Parainfluenza Virus 1 Not Detected Not Detected    Parainfluenza Virus 2 Not Detected Not Detected    Parainfluenza Virus 3 Not Detected Not Detected    Parainfluenza Virus 4 Not Detected Not Detected    RSV, PCR Not Detected Not Detected    Bordetella pertussis pcr Not Detected Not Detected    Bordetella parapertussis PCR Not Detected Not Detected    Chlamydophila pneumoniae PCR Not Detected Not Detected    Mycoplasma pneumo by PCR Not Detected Not Detected   Lactic Acid, Plasma    Collection Time: 10/03/23  6:04 PM    Specimen: Blood   Result Value Ref Range    Lactate 1.9 0.5 - 2.0 mmol/L   Urinalysis With Culture If Indicated - Urine, Catheter    Collection Time: 10/03/23  6:39 PM    Specimen: Urine, Catheter   Result Value Ref Range    Color, UA Dark Yellow (A) Yellow, Straw    Appearance, UA Clear Clear    pH, UA <=5.0 5.0 - 8.0    Specific Gravity, UA 1.020 1.001 - 1.030    Glucose, UA Negative Negative    Ketones, UA Negative Negative    Bilirubin, UA Negative Negative    Blood, UA Negative Negative    Protein, UA Negative Negative    Leuk Esterase, UA Negative Negative    Nitrite, UA Negative Negative    Urobilinogen, UA 1.0 E.U./dL 0.2 - 1.0 E.U./dL   High Sensitivity Troponin T 2Hr    Collection Time: 10/03/23  7:50 PM    Specimen: Blood   Result Value Ref Range    HS Troponin T 74 (C) <10 ng/L    Troponin T Delta -6 (L) >=-4 - <+4 ng/L     Note: In addition to lab results from this visit, the labs listed above may include labs taken at another facility or during a different encounter within the last 24 hours. Please correlate lab times with ED admission and discharge times for further clarification of the services performed during this visit.    CT Head Without Contrast   Final Result   1.No evidence for  acute intracranial abnormality.   2.Nonspecific white matter changes are noted with associated diffuse volume loss. These findings are likely related to chronic small vessel ischemic changes and/or age-related changes.         Electronically Signed: Peter Don MD     10/3/2023 7:13 PM EDT     Workstation ID: [id number]      CT Angiogram Chest   Final Result   Impression:      1. No evidence of pulmonary embolic disease.      2. Large free-flowing left pleural effusion and moderate right effusion.      3. Extensive atelectasis of the left lower lobe due to effusion and mild right lower lobe atelectasis.      4. Liver morphology consistent with cirrhosis. Upper abdominal ascites, on these limited images, appears to be extensive.      5. Incidentally noted heavy calcification of the left subclavian artery origin, suggesting high-grade stenosis, or possibly occlusion.            Electronically Signed: Brian Sutherland MD     10/3/2023 7:29 PM EDT     Workstation ID: [id number]      XR Chest 1 View   Final Result   Impression:   1.Left mid to lower lung airspace disease with small to moderate effusion suggestive of pneumonia. Correlate with symptoms.   2.Persistent small right effusion.         Electronically Signed: Isaac Smalls MD     10/3/2023 4:28 PM CDT     Workstation ID: [id number]        Vitals:    10/03/23 1900 10/03/23 1930 10/03/23 2000 10/03/23 2030   BP: 95/55 98/53 101/46 95/54   BP Location:       Patient Position:       Pulse: 80 86 80 83   Resp:       Temp:       TempSrc:       SpO2: 94% 96% 96% 95%   Weight:       Height:         Medications   sodium chloride 0.9 % bolus 500 mL (500 mL Intravenous New Bag 10/3/23 1844)   doxycycline (VIBRAMYCIN) 100 mg in sodium chloride 0.9 % 100 mL IVPB-VTB (100 mg Intravenous New Bag 10/3/23 1844)   iopamidol (ISOVUE-370) 76 % injection 85 mL (85 mL Intravenous Given 10/3/23 1909)     ECG/EMG Results (last 24 hours)       Procedure Component Value Units Date/Time    ECG 12  Lead Other; gen weakness [[id number]] Collected: 10/03/23 1723     Updated: 10/03/23 1723     QT Interval 448 ms      QTC Interval 504 ms     Narrative:      Test Reason : Other~  Blood Pressure :   */*   mmHG  Vent. Rate :  76 BPM     Atrial Rate :  76 BPM     P-R Int : 126 ms          QRS Dur : 102 ms      QT Int : 448 ms       P-R-T Axes : -20  30  36 degrees     QTc Int : 504 ms    Normal sinus rhythm  Low voltage QRS  Incomplete right bundle branch block  Cannot rule out Anterior infarct , age undetermined  Abnormal ECG  No previous ECGs available    Referred By: EDMD           Confirmed By:           ECG 12 Lead Other; gen weakness   Preliminary Result   Test Reason : Other~   Blood Pressure :   */*   mmHG   Vent. Rate :  76 BPM     Atrial Rate :  76 BPM      P-R Int : 126 ms          QRS Dur : 102 ms       QT Int : 448 ms       P-R-T Axes : -20  30  36 degrees      QTc Int : 504 ms      Normal sinus rhythm   Low voltage QRS   Incomplete right bundle branch block   Cannot rule out Anterior infarct , age undetermined   Abnormal ECG   No previous ECGs available      Referred By: EDMD           Confirmed By:               Medical Decision Making  Amount and/or Complexity of Data Reviewed  Labs: ordered.  Radiology: ordered.  ECG/medicine tests: ordered.    Risk  Prescription drug management.  Decision regarding hospitalization.        Final diagnoses:   Community acquired pneumonia, unspecified laterality   Hypoxia   Bilateral pleural effusion   Generalized weakness   Hyponatremia   Renal insufficiency       ED Disposition  ED Disposition       ED Disposition   Decision to Admit    Condition   --    Comment   Level of Care: Telemetry [5]   Diagnosis: Pneumonia [892023]                 No follow-up provider specified.       Medication List        ASK your doctor about these medications      bumetanide 1 MG tablet  Commonly known as: BUMEX  Ask about: Which instructions should I use?                 Sebastian Whelan  MD Ta  10/03/23 9783

## 2023-10-04 NOTE — THERAPY EVALUATION
Patient Name: Laura Hui  : 1950    MRN: [id number]                              Today's Date: 10/4/2023       Admit Date: 10/3/2023    Visit Dx:     ICD-10-CM ICD-9-CM   1. Community acquired pneumonia, unspecified laterality  J18.9 486   2. Hypoxia  R09.02 799.02   3. Bilateral pleural effusion  J90 511.9   4. Generalized weakness  R53.1 780.79   5. Hyponatremia  E87.1 276.1   6. Renal insufficiency  N28.9 593.9     Patient Active Problem List   Diagnosis    Abnormal thyroid blood test    Obesity (BMI 30.0-34.9)    Other emphysema    Major depressive disorder, recurrent, moderate    Atherosclerosis of native arteries of extremities with intermittent claudication, other extremity    Acute respiratory failure with hypoxia    GERD (gastroesophageal reflux disease)    Hyperlipidemia    Hypertension    Chronic obstructive pulmonary disease    CHF (congestive heart failure)    Anemia    Anxiety and depression    Chronic back pain    HEIDY (acute kidney injury)    Hyponatremia    Cirrhosis of liver    Generalized weakness    Cellulitis of right lower extremity     Past Medical History:   Diagnosis Date    Acid reflux     Anemia     Due to chronic blood loss - Chronic blood loss anemia    Apnea     Arthritis     Backache     CHF (congestive heart failure)     COPD (chronic obstructive pulmonary disease)     Severe    Degeneration of lumbar intervertebral disc     Drug therapy     Finding    Emphysema, unspecified     Esophageal reflux     Family history of malignant neoplasm of breast in first degree relative     Gall stones     GERD (gastroesophageal reflux disease)     H/O spinal fusion     H/O: drug dependency     Hearing loss     Heart disease     Hypertrophic cardiomyopathy     Insomnia     Mixed hyperlipidemia     Neurotic Depression     Recurrent major depressive episodes, moderate     Severe obesity     Tobacco use     History of     Past Surgical History:   Procedure Laterality Date    APPENDECTOMY       BACK SURGERY      BACK SURGERY      CARDIAC ABLATION      CARDIAC SURGERY      Cardiac Stent Catherization    CHOLECYSTECTOMY      Gall Bladder Surgery    HERNIA REPAIR      HIP SURGERY Left     LAPAROSCOPIC TUBAL LIGATION      NECK SURGERY      NECK SURGERY      ORTHOPEDIC SURGERY        General Information       Row Name 10/04/23 1403          Physical Therapy Time and Intention    Document Type evaluation  -ES     Mode of Treatment physical therapy  -ES       Row Name 10/04/23 1403          General Information    Patient Profile Reviewed yes  -ES     Prior Level of Function min assist:;all household mobility;transfer;bed mobility;ADL's;dependent:;home management;driving  Used FWW until about 2 weeks ago, now uses w/c primarily for mobility. Daughter assists with all ADLs as needed. Endorses at least 5 recent falls.  -ES     Existing Precautions/Restrictions fall;oxygen therapy device and L/min;other (see comments)  chronic LBP  -ES     Barriers to Rehab previous functional deficit;medically complex  -ES       Row Name 10/04/23 1403          Living Environment    People in Home child(melquiades), adult  -ES       Row Name 10/04/23 1403          Home Main Entrance    Number of Stairs, Main Entrance none  -ES       Row Name 10/04/23 1403          Stairs Within Home, Primary    Stairs, Within Home, Primary all needs met primary level  -ES       Row Name 10/04/23 1403          Cognition    Orientation Status (Cognition) oriented x 3  -ES       Row Name 10/04/23 1403          Safety Issues, Functional Mobility    Safety Issues Affecting Function (Mobility) awareness of need for assistance;insight into deficits/self-awareness;safety precaution awareness;safety precautions follow-through/compliance;sequencing abilities  -ES     Impairments Affecting Function (Mobility) balance;endurance/activity tolerance;pain;strength  -ES     Comment, Safety Issues/Impairments (Mobility) All mobility limited by LBP  -ES               User Key   (r) = Recorded By, (t) = Taken By, (c) = Cosigned By      Initials Name Provider Type    Radha Howell PT Physical Therapist                   Mobility       Row Name 10/04/23 1404          Bed Mobility    Bed Mobility rolling left;sidelying-sit;sit-sidelying;scooting/bridging  -ES     Rolling Left [place] (Bed Mobility) moderate assist (50% patient effort);verbal cues;nonverbal cues (demo/gesture)  -ES     Scooting/Bridging [place] (Bed Mobility) dependent (less than 25% patient effort);2 person assist;verbal cues;nonverbal cues (demo/gesture)  -ES     Sidelying-Sit [place] (Bed Mobility) maximum assist (25% patient effort);verbal cues;nonverbal cues (demo/gesture)  -ES     Sit-Sidelying [place] (Bed Mobility) maximum assist (25% patient effort);verbal cues;nonverbal cues (demo/gesture)  -ES     Assistive Device (Bed Mobility) bed rails;draw sheet;head of bed elevated  -ES     Comment, (Bed Mobility) Pt educated on logroll technique for pain management. No c/o dizziness with transition to upright posture.  -ES       Row Name 10/04/23 1404          Transfers    Comment, (Transfers) Pt declined further mobility due to increased LBP. RN aware and managing.  -ES       Row Name 10/04/23 1404          Bed-Chair Transfer    Bed-Chair [place] (Transfers) unable to assess  -ES       Row Name 10/04/23 1404          Sit-Stand Transfer    Sit-Stand [place] (Transfers) unable to assess  -ES       Row Name 10/04/23 1404          Gait/Stairs (Locomotion)    [place] Level (Gait) unable to assess  -ES     Comment, (Gait/Stairs) Unable to assess further mobility due to LBP  -ES               User Key  (r) = Recorded By, (t) = Taken By, (c) = Cosigned By      Initials Name Provider Type    Radha Howell PT Physical Therapist                   Obj/Interventions       Row Name 10/04/23 1406          Range of Motion Comprehensive    General Range of Motion lower extremity range of motion  deficits identified  -ES     Comment, General Range of Motion BLE AROM limited by increased back pain.  -ES       Row Name 10/04/23 1406          Strength Comprehensive (MMT)    General Manual Muscle Testing (MMT) Assessment lower extremity strength deficits identified  -ES     Comment, General Manual Muscle Testing (MMT) Assessment BLE grossly 3+/5  -ES       Row Name 10/04/23 1406          Balance    Balance Assessment sitting static balance;sitting dynamic balance  -ES     Static Sitting Balance contact guard  -ES     Dynamic Sitting Balance minimal assist;verbal cues;non-verbal cues (demo/gesture)  -ES     Position, Sitting Balance supported;sitting edge of bed  -ES     Balance Interventions sitting;supported;dynamic;static;occupation based/functional task  -ES     Comment, Balance no LOB  -ES       Row Name 10/04/23 1406          Sensory Assessment (Somatosensory)    Sensory Assessment (Somatosensory) LE sensation intact  -ES               User Key  (r) = Recorded By, (t) = Taken By, (c) = Cosigned By      Initials Name Provider Type    ES Radha Lazar, PT Physical Therapist                   Goals/Plan       Row Name 10/04/23 1409          Bed Mobility Goal 1 (PT)    Activity/Assistive Device (Bed Mobility Goal 1, PT) sit to supine/supine to sit  -ES     [place] Level/Cues Needed (Bed Mobility Goal 1, PT) contact guard required  -ES     Time Frame (Bed Mobility Goal 1, PT) long term goal (LTG);10 days  -ES       Row Name 10/04/23 1409          Transfer Goal 1 (PT)    Activity/Assistive Device (Transfer Goal 1, PT) sit-to-stand/stand-to-sit;bed-to-chair/chair-to-bed;walker, rolling  -ES     [place] Level/Cues Needed (Transfer Goal 1, PT) contact guard required  -ES     Time Frame (Transfer Goal 1, PT) long term goal (LTG);10 days  -ES       Row Name 10/04/23 1409          Gait Training Goal 1 (PT)    Activity/Assistive Device (Gait Training Goal 1, PT) gait (walking locomotion);assistive device  use;decrease fall risk;improve balance and speed;walker, rolling  -ES     [place] Level (Gait Training Goal 1, PT) minimum assist (75% or more patient effort)  -ES     Distance (Gait Training Goal 1, PT) 75  -ES     Time Frame (Gait Training Goal 1, PT) long term goal (LTG);10 days  -ES       Row Name 10/04/23 2653          Therapy Assessment/Plan (PT)    Planned Therapy Interventions (PT) balance training;bed mobility training;gait training;patient/family education;strengthening;neuromuscular re-education;transfer training  -ES               User Key  (r) = Recorded By, (t) = Taken By, (c) = Cosigned By      Initials Name Provider Type    ES Radha Lazar, LAMAR Physical Therapist                   Clinical Impression       Row Name 10/04/23 0363          Pain    Pain Intervention(s) Repositioned;Ambulation/increased activity  -ES     Additional Documentation Pain Scale: FACES Pre/Post-Treatment (Group)  -ES       Row Name 10/04/23 7353          Pain Scale: FACES Pre/Post-Treatment    Pain: FACES Scale, Pretreatment 6-->hurts even more  -ES     Posttreatment Pain Rating 6-->hurts even more  -ES     Pain Location - Side/Orientation Bilateral  -ES     Pain Location lower  -ES     Pain Location - back  -ES     Pre/Posttreatment Pain Comment Limits all mobility, RN aware and managing  -ES       Row Name 10/04/23 4164          Plan of Care Review    Plan of Care Reviewed With patient  -ES     Progress no change  PT IE  -ES     Outcome Evaluation PT eval complete. Pt presents with chronic LBP, generalized weakness, and history of multiple falls warranting skilled IPPT services. Pt required mod-maxA for bed mobility and was unable to tolerate further mobility due to increased back pain. RN aware and managing. PT rec IRF at d/c for best functional outcome.  -ES       Row Name 10/04/23 5008          Therapy Assessment/Plan (PT)    Rehab Potential (PT) good, to achieve stated therapy goals  -ES     Criteria for Skilled  Interventions Met (PT) yes;meets criteria;skilled treatment is necessary  -ES     Therapy Frequency (PT) daily  -ES       Row Name 10/04/23 1407          Vital Signs    Pre Systolic BP Rehab --  RN cleared for eval  -ES     O2 Delivery Pre Treatment nasal cannula  -ES     O2 Delivery Intra Treatment nasal cannula  -ES     O2 Delivery Post Treatment nasal cannula  -ES     Pre Patient Position Supine  -ES     Intra Patient Position Sitting  -ES     Post Patient Position Supine  -ES       Row Name 10/04/23 1407          Positioning and Restraints    Pre-Treatment Position in bed  -ES     Post Treatment Position bed  -ES     In Bed notified nsg;fowlers;call light within reach;encouraged to call for assist;exit alarm on;side rails up x2;legs elevated  -ES               User Key  (r) = Recorded By, (t) = Taken By, (c) = Cosigned By      Initials Name Provider Type    Radha Howell, PT Physical Therapist                   Outcome Measures       Row Name 10/04/23 1409 10/04/23 0800       How much help from another person do you currently need...    Turning from your back to your side while in flat bed without using bedrails? 2  -ES 3  -MC    Moving from lying on back to sitting on the side of a flat bed without bedrails? 2  -ES 2  -MC    Moving to and from a bed to a chair (including a wheelchair)? 2  -ES 2  -MC    Standing up from a chair using your arms (e.g., wheelchair, bedside chair)? 2  -ES 2  -MC    Climbing 3-5 steps with a railing? 1  -ES 2  -MC    To walk in hospital room? 1  -ES 2  -MC    AM-PAC 6 Clicks Score (PT) 10  -ES 13  -MC    Highest level of mobility 4 --> Transferred to chair/commode  -ES 4 --> Transferred to chair/commode  -MC      Row Name 10/04/23 1409          Functional Assessment    Outcome Measure Options AM-PAC 6 Clicks Basic Mobility (PT)  -ES               User Key  (r) = Recorded By, (t) = Taken By, (c) = Cosigned By      Initials Name Provider Type    Mary Saini RN  Registered Nurse    Radha Howell, PT Physical Therapist                                 Physical Therapy Education       Title: PT OT SLP Therapies (In Progress)       Topic: Physical Therapy (In Progress)       Point: Mobility training (Done)       Learning Progress Summary             Patient Acceptance, E,TB, VU by ORALIA at 10/4/2023 1410                         Point: Home exercise program (Not Started)       Learner Progress:  Not documented in this visit.              Point: Body mechanics (Done)       Learning Progress Summary             Patient Acceptance, E,TB, VU by ORALIA at 10/4/2023 1410                         Point: Precautions (Done)       Learning Progress Summary             Patient Acceptance, E,TB, VU by ORALIA at 10/4/2023 1410                                         User Key       Initials Effective Dates Name Provider Type Discipline    ORALIA 08/11/22 -  Radha Lazar PT Physical Therapist PT                  PT Recommendation and Plan  Planned Therapy Interventions (PT): balance training, bed mobility training, gait training, patient/family education, strengthening, neuromuscular re-education, transfer training  Plan of Care Reviewed With: patient  Progress: no change (PT IE)  Outcome Evaluation: PT eval complete. Pt presents with chronic LBP, generalized weakness, and history of multiple falls warranting skilled IPPT services. Pt required mod-maxA for bed mobility and was unable to tolerate further mobility due to increased back pain. RN aware and managing. PT rec IRF at d/c for best functional outcome.     Time Calculation:   PT Evaluation Complexity  History, PT Evaluation Complexity: 1-2 personal factors and/or comorbidities  Examination of Body Systems (PT Eval Complexity): total of 3 or more elements  Clinical Presentation (PT Evaluation Complexity): stable  Clinical Decision Making (PT Evaluation Complexity): low complexity  Overall Complexity (PT Evaluation Complexity): low  complexity     PT Charges       Row Name 10/04/23 1410             Time Calculation    Start Time 1341  -ES      PT Received On 10/04/23  -ES      PT Goal Re-Cert Due Date 10/14/23  -ES         Time Calculation- PT    Total Timed Code Minutes- PT 8 minute(s)  -ES         Timed Charges    06006 - PT Therapeutic Activity Minutes 8  -ES         Untimed Charges    PT Eval/Re-eval Minutes 31  -ES         Total Minutes    Timed Charges Total Minutes 8  -ES      Untimed Charges Total Minutes 31  -ES       Total Minutes 39  -ES                User Key  (r) = Recorded By, (t) = Taken By, (c) = Cosigned By      Initials Name Provider Type    ES Radha Lazar, PT Physical Therapist                  Therapy Charges for Today       Code Description Service Date Service Provider Modifiers Qty    04326725273 HC PT THERAPEUTIC ACT EA 15 MIN 10/4/2023 Radha Lazar, PT GP 1    96052831586 HC PT EVAL LOW COMPLEXITY 3 10/4/2023 Radha Lazar, PT GP 1            PT G-Codes  Outcome Measure Options: AM-PAC 6 Clicks Basic Mobility (PT)  AM-PAC 6 Clicks Score (PT): 10  PT Discharge Summary  Anticipated Discharge Disposition (PT): inpatient rehabilitation facility    Radha Lazar PT  10/4/2023

## 2023-10-04 NOTE — PLAN OF CARE
Goal Outcome Evaluation:  Plan of Care Reviewed With: patient, daughter     Problem: Adult Inpatient Plan of Care  Goal: Plan of Care Review  Outcome: Ongoing, Progressing  Flowsheets (Taken 10/4/2023 1802)  Progress: no change  Plan of Care Reviewed With:   patient   daughter        Progress: no change     Patient started on midodrine for hypotension. Paracentesis completed today and patient appears to feel more comfortable at this time. Afebrile and NSR on monitor. Continues 2L O2/NC. Will monitor.

## 2023-10-04 NOTE — NURSING NOTE
US guided paracentesis performed by Dr Izquierdo.  3.3 Liters of fluid removed from peritoneum. Patient tolerated well. Report called to RN.

## 2023-10-04 NOTE — PRE-SEDATION DOCUMENTATION
[facility]   Vascular Interventional Radiology  History & Physicial        Patient Name:Laura Hui    : 1950    MRN: [id number]    Primary Care Physician: Carmella Barboza DO    Referring Physician: No ref. provider found     Date of admission: 10/3/2023    Subjective     Reason for Consult: Para    History of Present Illness     Laura Hui is a 73 y.o. female referred to IR as noted above.      Active Hospital Problems:  Active Hospital Problems    Diagnosis     **Acute respiratory failure with hypoxia     CHF (congestive heart failure)     Anemia     Anxiety and depression     Chronic back pain     HEIDY (acute kidney injury)     Hyponatremia     Cirrhosis of liver     Generalized weakness     Cellulitis of right lower extremity     GERD (gastroesophageal reflux disease)     Hypertension     Atherosclerosis of native arteries of extremities with intermittent claudication, other extremity     Chronic obstructive pulmonary disease     Hyperlipidemia        Personal History     Past Medical History:   Diagnosis Date    Acid reflux     Anemia     Due to chronic blood loss - Chronic blood loss anemia    Apnea     Arthritis     Backache     CHF (congestive heart failure)     COPD (chronic obstructive pulmonary disease)     Severe    Degeneration of lumbar intervertebral disc     Drug therapy     Finding    Emphysema, unspecified     Esophageal reflux     Family history of malignant neoplasm of breast in first degree relative     Gall stones     GERD (gastroesophageal reflux disease)     H/O spinal fusion     H/O: drug dependency     Hearing loss     Heart disease     Hypertrophic cardiomyopathy     Insomnia     Mixed hyperlipidemia     Neurotic Depression     Recurrent major depressive episodes, moderate     Severe obesity     Tobacco use     History of       Past Surgical History:   Procedure Laterality Date    APPENDECTOMY      BACK SURGERY      BACK SURGERY      CARDIAC ABLATION      CARDIAC  SURGERY      Cardiac Stent Catherization    CHOLECYSTECTOMY      Gall Bladder Surgery    HERNIA REPAIR      HIP SURGERY Left     LAPAROSCOPIC TUBAL LIGATION      NECK SURGERY      NECK SURGERY      ORTHOPEDIC SURGERY         Family History: Her family history includes Arthritis in her maternal grandmother; Breast cancer in her mother; Cancer in an other family member; Heart attack in her maternal grandmother; Multiple sclerosis in her mother; Thyroid disease in her mother.     Social History: She  reports that she quit smoking about 15 years ago. Her smoking use included cigarettes. She has never used smokeless tobacco. She reports that she does not drink alcohol and does not use drugs.    Home Medications:  Fluticasone-Umeclidin-Vilant, albuterol, albuterol sulfate HFA, aspirin, atorvastatin, bumetanide, citalopram, fluticasone, metoprolol tartrate, multivitamin with fluoride/iron, pantoprazole, potassium chloride, roflumilast, and traZODone    Current Medications:    acetaminophen    aspirin    atorvastatin    senna-docusate sodium **AND** polyethylene glycol **AND** bisacodyl **AND** bisacodyl    budesonide-formoterol **AND** tiotropium bromide monohydrate    Calcium Replacement - Follow Nurse / BPA Driven Protocol    cefTRIAXone    citalopram    doxycycline    heparin (porcine)    HYDROcodone-acetaminophen    Magnesium Standard Dose Replacement - Follow Nurse / BPA Driven Protocol    melatonin    metoprolol tartrate    midodrine    pantoprazole    Phosphorus Replacement - Follow Nurse / BPA Driven Protocol    Potassium Replacement - Follow Nurse / BPA Driven Protocol    roflumilast    saccharomyces boulardii    sodium chloride    sodium chloride    sodium chloride    traZODone     Allergies:  Allergies   Allergen Reactions    Sulfa Antibiotics Rash       Review of Systems    IR Procedure pertinent significant findings are mentioned in the PMH and PSH above.    Objective     Visit Vitals  BP 91/42   Pulse 107  "  Temp 97.7 øF (36.5 øC) (Oral)   Resp 14   Ht 165.1 cm (65\")   Wt 82 kg (180 lb 11.2 oz)   SpO2 97%   BMI 30.07 kg/mý        Physical Exam    A&Ox3.   Able to communicate  No Apparent Distress  Average physique   CVS: VS as noted. Chart reviewed. Stable for the procedure.  Respiratory: Non labored breathing. No signs of respiratory compromise.    Result Review      I have personally reviewed the results from the time of this admission to 10/4/2023 16:35 EDT and agree with these findings.  [x]  Laboratory  []  Microbiology  [x]  Radiology  []  EKG/Telemetry   []  Cardiology/Vascular   []  Pathology  []  Old records  []  Other:    Most notable findings include: As noted:    Results from last 7 days   Lab Units 10/04/23  0453 10/03/23  1720   INR   --  1.19*   WBC 10*3/mm3 9.18 10.93*   HEMOGLOBIN g/dL 11.7* 14.6   HEMATOCRIT % 34.3 42.2   PLATELETS 10*3/mm3 143 213       Results from last 7 days   Lab Units 10/04/23  0453 10/03/23  1720   SODIUM mmol/L 127* 128*   POTASSIUM mmol/L 4.5 5.0   CHLORIDE mmol/L 96* 96*   CO2 mmol/L 16.0* 19.0*   BUN mg/dL 20 21   CREATININE mg/dL 1.59* 1.41*   EGFR mL/min/1.73 34.2* 39.5*   GLUCOSE mg/dL 69 91           Lab 10/04/23  0453 10/03/23  1720   TOTAL PROTEIN 4.7* 5.5*   ALBUMIN 2.3* 2.3*   GLOBULIN 2.4 3.2   ALT (SGPT) 19 17   AST (SGOT) 55* 71*   BILIRUBIN 0.9 1.1   ALK PHOS 194* 262*       Estimated Creatinine Clearance: 33.3 mL/min (A) (by C-G formula based on SCr of 1.59 mg/dL (H)).   Creatinine   Date Value Ref Range Status   10/04/2023 1.59 (H) 0.57 - 1.00 mg/dL Final   10/03/2023 1.41 (H) 0.57 - 1.00 mg/dL Final       COVID19   Date Value Ref Range Status   10/03/2023 Not Detected Not Detected - Ref. Range Final        No results found for: PREGTESTUR, PREGSERUM, HCG, HCGQUANT     ASA SCALE ASSESSMENT (applicable ONLY if sedation planned):        MALLAMPATI CLASSIFICATION (applicable ONLY if sedation planned):       Assessment / Plan     Laura Hui is a 73 y.o. female " referred to the IR service with above problem.    Plan:   As above.    Notice: The note was created before the performance of the procedure. It might have been left in the pending status and signed off after the procedure. The time stamp on the note may be misleading.    Demetrius Izquierdo MD   Vascular Interventional Radiology  10/04/23   4:35 PM EDT

## 2023-10-04 NOTE — PROGRESS NOTES
[facility] Medicine Services  PROGRESS NOTE    Patient Name: Laura Hui  : 1950  MRN: [id number]    Date of Admission: 10/3/2023  Primary Care Physician: Carmella Barboza DO    Subjective   Subjective     CC:  Ascites, SOA    HPI:  No acute events. States she is SOA with any movement. Progressive over the last few weeks. Reviewed imaging findings. Reviewed pending GI consult      Objective   Objective     Vital Signs:   Temp:  [97.7 øF (36.5 øC)-97.9 øF (36.6 øC)] 97.7 øF (36.5 øC)  Heart Rate:  [] 106  Resp:  [14-21] 18  BP: ()/() 106/58  Flow (L/min):  [2-3] 3     Physical Exam:  Constitutional: No acute distress, awake, alert; chronically ill appearing   HENT: NCAT, mucous membranes moist  Respiratory: diminished in the bases  Cardiovascular: RRR, no murmurs, rubs, or gallops  Gastrointestinal: Positive bowel sounds, distended, mild tenderness  Musculoskeletal: trace bilateral ankle edema  Psychiatric: Appropriate affect, cooperative  Neurologic: Oriented x 3, strength symmetric in all extremities, Cranial Nerves grossly intact to confrontation, speech clear  Skin: No rashes      Results Reviewed:  LAB RESULTS:      Lab 10/04/23  0453 10/03/23  1804 10/03/23  1720   WBC 9.18  --  10.93*   HEMOGLOBIN 11.7*  --  14.6   HEMATOCRIT 34.3  --  42.2   PLATELETS 143  --  213   NEUTROS ABS 7.52*  --  9.28*   IMMATURE GRANS (ABS) 0.04  --  0.05   LYMPHS ABS 0.69*  --  0.77   MONOS ABS 0.88  --  0.80   EOS ABS 0.03  --  0.02   MCV 94.5  --  94.4   PROCALCITONIN  --   --  0.97*   LACTATE  --  1.9  --    PROTIME  --   --  15.2*   D DIMER QUANT  --   --  2.84*         Lab 10/04/23  0453 10/03/23  2222 10/03/23  1720   SODIUM 127*  --  128*   POTASSIUM 4.5  --  5.0   CHLORIDE 96*  --  96*   CO2 16.0*  --  19.0*   ANION GAP 15.0  --  13.0   BUN 20  --  21   CREATININE 1.59*  --  1.41*   EGFR 34.2*  --  39.5*   GLUCOSE 69  --  91   CALCIUM 8.7  --  9.1   MAGNESIUM  --   1.6 2.0   TSH  --   --  4.920*         Lab 10/04/23  0453 10/03/23  1720   TOTAL PROTEIN 4.7* 5.5*   ALBUMIN 2.3* 2.3*   GLOBULIN 2.4 3.2   ALT (SGPT) 19 17   AST (SGOT) 55* 71*   BILIRUBIN 0.9 1.1   ALK PHOS 194* 262*         Lab 10/03/23  2222 10/03/23  1950/23  1720   PROBNP  --   --  10,567.0*   HSTROP T 77* 74* 80*   PROTIME  --   --  15.2*   INR  --   --  1.19*                 Lab 10/03/23  2328   PH, ARTERIAL 7.454*   PCO2, ARTERIAL 25.7*   PO2 ART 82.6*   FIO2 32   HCO3 ART 18.0*   BASE EXCESS ART -4.4*   CARBOXYHEMOGLOBIN 0.9     Brief Urine Lab Results  (Last result in the past 365 days)        Color   Clarity   Blood   Leuk Est   Nitrite   Protein   CREAT   Urine HCG        10/03/23 1839 Dark Yellow   Clear   Negative   Negative   Negative   Negative                   Microbiology Results Abnormal       Procedure Component Value - Date/Time    MRSA Screen, PCR (Inpatient) - Swab, Nares [[id number]]  (Normal) Collected: 10/03/23 2327    Lab Status: Final result Specimen: Swab from Nares Updated: 10/04/23 0811     MRSA PCR Negative    Narrative:      The negative predictive value of this diagnostic test is high and should only be used to consider de-escalating anti-MRSA therapy. A positive result may indicate colonization with MRSA and must be correlated clinically.  MRSA Negative    Respiratory Panel PCR w/COVID-19(SARS-CoV-2) JEN/BETSY/ELIEZER/PAD/COR/MAD/URSULA In-House, NP Swab in UTM/[facility], 3-4 HR TAT - Swab, Nasopharynx [[id number]]  (Normal) Collected: 10/03/23 1726    Lab Status: Final result Specimen: Swab from Nasopharynx Updated: 10/03/23 1825     ADENOVIRUS, PCR Not Detected     Coronavirus 229E Not Detected     Coronavirus HKU1 Not Detected     Coronavirus NL63 Not Detected     Coronavirus OC43 Not Detected     COVID19 Not Detected     Human Metapneumovirus Not Detected     Human Rhinovirus/Enterovirus Not Detected     Influenza A PCR Not Detected     Influenza B PCR Not Detected     Parainfluenza  Virus 1 Not Detected     Parainfluenza Virus 2 Not Detected     Parainfluenza Virus 3 Not Detected     Parainfluenza Virus 4 Not Detected     RSV, PCR Not Detected     Bordetella pertussis pcr Not Detected     Bordetella parapertussis PCR Not Detected     Chlamydophila pneumoniae PCR Not Detected     Mycoplasma pneumo by PCR Not Detected    Narrative:      In the setting of a positive respiratory panel with a viral infection PLUS a negative procalcitonin without other underlying concern for bacterial infection, consider observing off antibiotics or discontinuation of antibiotics and continue supportive care. If the respiratory panel is positive for atypical bacterial infection (Bordetella pertussis, Chlamydophila pneumoniae, or Mycoplasma pneumoniae), consider antibiotic de-escalation to target atypical bacterial infection.            CT Head Without Contrast    Result Date: 10/3/2023  CT HEAD WO CONTRAST Date of Exam: 10/3/2023 7:07 PM EDT Indication: gen weakness. Comparison: None available. Technique: Axial CT images were obtained of the head without contrast administration.  Automated exposure control and iterative construction methods were used. FINDINGS: There is no evidence for acute intracranial hemorrhage. No definitive acute focal ischemia is identified. Nonspecific white matter changes are noted likely related to chronic small vessel ischemic changes and age-related changes. Associated diffuse volume loss is observed. There is no evidence for abnormal cerebral edema. There is no mass effect or midline shift. The ventricular system is nondilated. The basal cisterns are patent. The skull is intact. The paranasal sinuses and mastoid air cells are  clear.     Impression: 1.No evidence for acute intracranial abnormality. 2.Nonspecific white matter changes are noted with associated diffuse volume loss. These findings are likely related to chronic small vessel ischemic changes and/or age-related changes.  Electronically Signed: Peter Don MD  10/3/2023 7:13 PM EDT  Workstation ID: [id number]    US Liver    Result Date: 10/4/2023  US LIVER Date of Exam: 10/4/2023 7:00 AM CDT Indication: new cirrhosis. Comparison: No comparisons available. Technique: Grayscale and color Doppler ultrasound evaluation of the right upper quadrant was performed. Findings: LIVER: Nodular contours and heterogeneous parenchymal echotexture consistent with cirrhosis. No focal lesions identified. Normal flow is present within the hepatic vasculature. GALLBLADDER: Gallbladder is surgically absent. The common bile duct measures 2 mm in diameter, normal. PANCREAS:  Visualized portions are unremarkable. AORTA/IVC:  Visualized portions are unremarkable. RIGHT KIDNEY:  Normal in size with no focal lesions. No evidence of nephrolithiasis or hydronephrosis. There is moderate volume ascites. There is partial visualization of a right pleural effusion.     Impression: Impression: 1.Cirrhosis with ascites and right pleural effusion. Electronically Signed: Isaac Smalls MD  10/4/2023 7:34 AM CDT  Workstation ID: [id number]    XR Chest 1 View    Result Date: 10/3/2023  XR CHEST 1 VW Date of Exam: 10/3/2023 4:12 PM CDT Indication: gen weakness Comparison: 7/19/2023 Findings: Cardiomediastinal silhouette is unremarkable. There is left mid to lower lung airspace disease suggestive of pneumonia. Small to moderate left and small right effusions. No acute osseous abnormality identified.     Impression: Impression: 1.Left mid to lower lung airspace disease with small to moderate effusion suggestive of pneumonia. Correlate with symptoms. 2.Persistent small right effusion. Electronically Signed: Isaac Smalls MD  10/3/2023 4:28 PM CDT  Workstation ID: [id number]    CT Angiogram Chest    Result Date: 10/3/2023  CT ANGIOGRAM CHEST Date of Exam: 10/3/2023 7:07 PM EDT Indication: SOB, hypoxic, elevated dimer. Comparison: None available. Technique: CTA of the chest was  performed after the uneventful intravenous administration of 85 mL Isovue 370. Reconstructed coronal and sagittal images were also obtained. In addition, a 3-D volume rendered image was created for interpretation. Automated exposure control and iterative reconstruction methods were used. Findings: There is generally good contrast opacification of the pulmonary arteries and no evidence of embolic disease. No evidence of thoracic aortic aneurysm or dissection is seen. There is probably high-grade origin stenosis of the left subclavian artery, with very dense calcification of the lumen on image 26 series 4. No pericardial effusion or mediastinal adenopathy is seen. There is diffuse coronary artery calcium. Multiple macrocalcifications are seen in the normal sized thyroid There is a large free-flowing left pleural effusion and moderate free-flowing right pleural effusion. There is nearly complete atelectasis of the left lower lobe and milder right lower lobe atelectasis. Mild micro bullous change is seen of the upper lobes. No other evidence of active pulmonary parenchymal disease is appreciated. There is a calcified right middle lobe granuloma. The airways appear to be normally patent. Included images of the upper abdomen show extensive ascites. Liver morphology suggests cirrhosis, with marked enlargement of the left liver lobe and small right liver lobe. There appear to be clips in the gallbladder fossa. Spleen is normal in size. Bony structures appear to be intact.     Impression: Impression: 1. No evidence of pulmonary embolic disease. 2. Large free-flowing left pleural effusion and moderate right effusion. 3. Extensive atelectasis of the left lower lobe due to effusion and mild right lower lobe atelectasis. 4. Liver morphology consistent with cirrhosis. Upper abdominal ascites, on these limited images, appears to be extensive. 5. Incidentally noted heavy calcification of the left subclavian artery origin,  suggesting high-grade stenosis, or possibly occlusion. Electronically Signed: Brian Sutherland MD  10/3/2023 7:29 PM EDT  Workstation ID: [id number]         Current medications:  Scheduled Meds:albumin human, 25 g, Intravenous, Once  aspirin, 81 mg, Oral, Daily  atorvastatin, 40 mg, Oral, Nightly  budesonide-formoterol, 2 puff, Inhalation, BID - RT   And  tiotropium bromide monohydrate, 2 puff, Inhalation, Daily - RT  bumetanide, 1 mg, Oral, BID  cefTRIAXone, 2,000 mg, Intravenous, Q24H  citalopram, 20 mg, Oral, Daily  doxycycline, 100 mg, Intravenous, Q12H  heparin (porcine), 5,000 Units, Subcutaneous, Q12H  metoprolol tartrate, 50 mg, Oral, BID With Meals  pantoprazole, 40 mg, Oral, Daily  roflumilast, 250 mcg, Oral, Daily  saccharomyces boulardii, 250 mg, Oral, BID  senna-docusate sodium, 2 tablet, Oral, BID  sodium chloride, 10 mL, Intravenous, Q12H  traZODone, 150 mg, Oral, Nightly      Continuous Infusions:   PRN Meds:.  acetaminophen    senna-docusate sodium **AND** polyethylene glycol **AND** bisacodyl **AND** bisacodyl    Calcium Replacement - Follow Nurse / BPA Driven Protocol    Magnesium Standard Dose Replacement - Follow Nurse / BPA Driven Protocol    melatonin    Phosphorus Replacement - Follow Nurse / BPA Driven Protocol    Potassium Replacement - Follow Nurse / BPA Driven Protocol    sodium chloride    sodium chloride    Assessment & Plan   Assessment & Plan     Active Hospital Problems    Diagnosis  POA    **Acute respiratory failure with hypoxia [J96.01]  Yes    CHF (congestive heart failure) [I50.9]  Yes    Anemia [D64.9]  Yes    Anxiety and depression [F41.9, F32.A]  Yes    Chronic back pain [M54.9, G89.29]  Yes    HEIDY (acute kidney injury) [N17.9]  Yes    Hyponatremia [E87.1]  Yes    Cirrhosis of liver [K74.60]  Yes    Generalized weakness [R53.1]  Yes    Cellulitis of right lower extremity [L03.115]  Yes    GERD (gastroesophageal reflux disease) [K21.9]  Yes    Hypertension [I10]  Yes     Atherosclerosis of native arteries of extremities with intermittent claudication, other extremity [I70.218]  Yes    Chronic obstructive pulmonary disease [J44.9]  Yes    Hyperlipidemia [E78.5]  Yes      Resolved Hospital Problems   No resolved problems to display.        Brief Hospital Course to date:  73 to female here with dyspnea x 3 days and weakness. Probable new diagnosis of cirrhosis with ascites and pleural effusions.       Left > right pleural effusion  Cirrhosis/ascites (CTA chest) - new finding   Hypoalbuminemia , 2.3   - CTA chest demonstrates mod left effusion and smaller R effusion; cirrhotic liver with large ascites noted  - Liver US with cirrhosis, ascites and right pleural effusion  - GI consulted  - Diagnosis/therapeutic paracentesis ordered   - pt reports history of fatty liver. Denies alcoholism.  - Continue rocephin to cover for ? SBP    Dyspnea and hypoxia   - she was recently admitted at [facility] and diagnosed w CHF, but had echo showing EF 75%; diuretic increased   - CTA chest with no PE, large left pleural effusion and moderate right pleural effusion, atelectasis  - Continue treatment for ? PNA for now. -- can likely de-escalate soon   - Consider thoracentesis. Repeat CXR in the AM      HEIDY  - Cr baseline 0.9, trending up  - ? Cardiorenal vs effect of overdiuresis (diuretic recently increased) or ATN   - UA negative  - Hold bumex. Albumin. Start midodrine   - Neph if continues to trend up     Possible HFpEF (vs ascites and hypoalbuminemia causing pl effusions)   HTN HLD  - recent admission with echo at [facility]:  records from OSH pending  - Currently holding bumex due to HEIDY  - Hold metoprolol due to hypotension   - continue statin  - daily weights  - strict I&O     Acute onset weakness  - until a couple weeks ago she could walk without a cane or walker  - now using a wheelchair  - PT OT   -  on high protein diet      Lightheadedness  Borderline hypotn  - holding metoprolol and bumex. Start  albumin and midodrine     COPD, emphysema   - no on supplemental oxygen at home  - former smoker, now vapes. Encouraged cessation  - continue with inhalers      Hyponatremia hypervolemic   - new finding.   - check urine sodium, urine osmolality, serum osmolality  - cortisol appropriate. TSH   - monitor chemistry closely     PVD  - incidental finding of atherosclerosis of left subclavian artery, suggesting high-grade stenosis, or possibly occlusion.   - consider vascular input vs outpatient w.u     Generalized weakness  - PT/OT     Anemia  - H/H stable and at baseline     GERD  - PPI     Anxiety/depression  - Celexa, Trazodone, consider holding pending progression of hyponatremia    Expected Discharge Location and Transportation: TBD  Expected Discharge   Expected Discharge Date: 10/6/2023; Expected Discharge Time:      DVT prophylaxis:  Medical DVT prophylaxis orders are present.     AM-PAC 6 Clicks Score (PT): 13 (10/04/23 0800)    CODE STATUS:   Code Status and Medical Interventions:   Ordered at: 10/03/23 2043     Level Of Support Discussed With:    Patient     Code Status (Patient has no pulse and is not breathing):    CPR (Attempt to Resuscitate)     Medical Interventions (Patient has pulse or is breathing):    Full Support       Anna William DO  10/04/23

## 2023-10-04 NOTE — PLAN OF CARE
Goal Outcome Evaluation:  Plan of Care Reviewed With: patient        Progress: no change (PT IE)  Outcome Evaluation: PT eval complete. Pt presents with chronic LBP, generalized weakness, and history of multiple falls warranting skilled IPPT services. Pt required mod-maxA for bed mobility and was unable to tolerate further mobility due to increased back pain. RN aware and managing. PT rec IRF at d/c for best functional outcome.      Anticipated Discharge Disposition (PT): inpatient rehabilitation facility

## 2023-10-04 NOTE — CASE MANAGEMENT/SOCIAL WORK
Discharge Planning Assessment  [facility]     Patient Name: Laura Hui  MRN: [id number]  Today's Date: 10/4/2023    Admit Date: 10/3/2023    Plan: Home at DC   Discharge Needs Assessment       Row Name 10/04/23 0942       Living Environment    People in Home child(melquiades), adult    Current Living Arrangements home    Living Arrangement Comments Lives on one level with her daughter. Daughter cooks and cleans and assists the pt as needed.       Discharge Needs Assessment    Equipment Currently Used at Home wheelchair;walker, rolling    Equipment Needed After Discharge none    Discharge Coordination/Progress States she has HH services but unsure provider. I could not find it in the system.                   Discharge Plan       Row Name 10/04/23 0944       Plan    Plan Home at DC    Patient/Family in Agreement with Plan yes    Plan Comments I spoke with the pt. She denies any DC needs at this time. I will try and confirm if she is current with HH.    Final Discharge Disposition Code 01 - home or self-care      Row Name 10/04/23 0856       Plan    Final Discharge Disposition Code 01 - home or self-care                  Continued Care and Services - Admitted Since 10/3/2023    Coordination has not been started for this encounter.       Expected Discharge Date and Time       Expected Discharge Date Expected Discharge Time    Oct 6, 2023            Demographic Summary    No documentation.                  Functional Status       Row Name 10/04/23 0942       Functional Status    Usual Activity Tolerance moderate       Functional Status, IADL    Medications independent    Meal Preparation assistive person    Housekeeping assistive person    Laundry assistive person    Shopping assistive person                   Psychosocial    No documentation.                  Abuse/Neglect    No documentation.                  Legal    No documentation.                  Substance Abuse    No documentation.                  Patient Forms     No documentation.                     Alice Menjivar RN

## 2023-10-04 NOTE — PROCEDURES
The following procedure was performed: para    Please see corresponding Radiology report for in detail procedural information. The Radiology report will be dictated shortly, if not done so already. Please see the IR RN note for the information regarding medicines administered if any, anabell-procedural vitals and I/O information.

## 2023-10-04 NOTE — NURSING NOTE
WOC consult for pressure injury to Gluteal coccyx.    Patient has midline ITD, quickly blanchable superficial open area linear.    Recommend Cleanse with pH balanced no-rinse foam soap and blue barrier wipes, apply thin layer of z guard.  Recommend application of waffle mattress    Sign off

## 2023-10-04 NOTE — CONSULTS
[facility] [place] Consult    Referring Provider: No ref. provider found    PCP: Carmella Barboza DO    Reason for Consultation: SIMS cirrhosis    History of present illness:    Laura Hui is a 73 y.o. female, PMH includes COPD, GERD, CHF, HTN, HL, CHF, SIMS cirrhosis, anxiety, depression, is admitted via ED yesterday for evaluation of SOA, pleural effusion.     Pt states that she has been told for many years that she has a fatty liver, denies previous GI evaluation for such. She denies FHx of liver disease or cirrhosis. She c/o generalized abdominal discomfort and chronic back pain at this time. Patient denies associated fever, chills, indigestion, nausea, vomiting, diarrhea, constipation, hematemesis, dysphagia, hematochezia, melena, dysuria, jaundice or bruising.    Labs at time of admission significant for WBC 10.93, Hb 14.6, Hct 42.2, plt 213, Bun 21, Cr 1.41, total bili 1.1, AST 71, ALT 17, alk phos 262, PT 15.2, INR 1.19,     Liver US 10/4: Cirrhosis with ascites and right pleural effusion    Patient denies personal or FHx of PUD, H Pylori, gastritis, pancreatitis, colitis, Celiac disease, UC, Crohn's disease, IBS, colon or gastric cancers. Pt denies EtOH, tobacco, illicit substance or NSAID use.    EGD / CSY in remote past, pt unsure of findings.     Allergies:  Sulfa antibiotics    Scheduled Meds:  albumin human, 25 g, Intravenous, Once  aspirin, 81 mg, Oral, Daily  atorvastatin, 40 mg, Oral, Nightly  budesonide-formoterol, 2 puff, Inhalation, BID - RT   And  tiotropium bromide monohydrate, 2 puff, Inhalation, Daily - RT  bumetanide, 1 mg, Oral, BID  cefTRIAXone, 2,000 mg, Intravenous, Q24H  citalopram, 20 mg, Oral, Daily  doxycycline, 100 mg, Intravenous, Q12H  heparin (porcine), 5,000 Units, Subcutaneous, Q12H  metoprolol tartrate, 50 mg, Oral, BID With Meals  pantoprazole, 40 mg, Oral, Daily  roflumilast, 250 mcg, Oral, Daily  saccharomyces boulardii, 250 mg, Oral, BID  senna-docusate sodium, 2  tablet, Oral, BID  sodium chloride, 10 mL, Intravenous, Q12H  traZODone, 150 mg, Oral, Nightly         Infusions:       PRN Meds:    acetaminophen    senna-docusate sodium **AND** polyethylene glycol **AND** bisacodyl **AND** bisacodyl    Calcium Replacement - Follow Nurse / BPA Driven Protocol    Magnesium Standard Dose Replacement - Follow Nurse / BPA Driven Protocol    melatonin    Phosphorus Replacement - Follow Nurse / BPA Driven Protocol    Potassium Replacement - Follow Nurse / BPA Driven Protocol    sodium chloride    sodium chloride    Home Meds:  Medications Prior to Admission   Medication Sig Dispense Refill Last Dose    albuterol (PROVENTIL) (2.5 MG/3ML) 0.083% nebulizer solution Take 2.5 mg by nebulization Every 4 (Four) Hours As Needed for Wheezing.   Past Week    albuterol sulfate  (90 Base) MCG/ACT inhaler Inhale 2 puffs Every 4 (Four) Hours As Needed for Wheezing. 8 g 3 Past Week    aspirin 81 MG chewable tablet Chew 1 tablet Daily.   10/2/2023    atorvastatin (Lipitor) 40 MG tablet Take 1 tablet by mouth Every Night. 90 tablet 1 10/2/2023    citalopram (CeleXA) 20 MG tablet TAKE ONE TABLET BY MOUTH DAILY 90 tablet 1 10/2/2023    Daliresp 250 MCG tablet tablet TAKE ONE TABLET BY MOUTH DAILY 56 tablet 0 10/2/2023    KLOR-CON 20 MEQ CR tablet TAKE ONE TABLET BY MOUTH DAILY 90 tablet 1 10/2/2023    metoprolol tartrate (LOPRESSOR) 50 MG tablet Take 1 tablet by mouth 2 (Two) Times a Day With Meals. 180 tablet 1 10/2/2023    pantoprazole (Protonix) 40 MG EC tablet Take 1 tablet by mouth Daily. 90 tablet 1 10/2/2023    traZODone (DESYREL) 100 MG tablet TAKE TWO TABLETS BY MOUTH ONCE NIGHTLY 180 tablet 1 10/2/2023    Trelegy Ellipta 100-62.5-25 MCG/ACT inhaler Inhale 1 puff Daily.   10/2/2023    bumetanide (BUMEX) 1 MG tablet Take 1 tablet by mouth 2 (Two) Times a Day.       fluticasone (FLONASE) 50 MCG/ACT nasal spray SPRAY TWO SPRAYS IN EACH NOSTRIL ONCE DAILY 16 mL 2     Ped Multivitamins-Fl-Iron  (MULTIVITAMIN WITH FLUORIDE/IRON) 0.25-10 MG/ML solution solution Take  by mouth Daily.          ROS: Review of Systems   Constitutional:  Negative for chills, diaphoresis, fatigue and unexpected weight change.   HENT:  Negative for drooling, facial swelling, mouth sores, nosebleeds, rhinorrhea, sore throat, tinnitus, trouble swallowing and voice change.    Respiratory:  Positive for shortness of breath. Negative for cough and chest tightness.    Cardiovascular:  Positive for leg swelling. Negative for chest pain and palpitations.   Gastrointestinal:  Positive for abdominal distention and abdominal pain. Negative for blood in stool, constipation, diarrhea, nausea and vomiting.   Genitourinary:  Negative for dysuria, flank pain and hematuria.   Musculoskeletal:  Negative for arthralgias, joint swelling and myalgias.   Skin:  Negative for color change, pallor and rash.   Neurological:  Positive for weakness. Negative for dizziness, tremors, syncope and light-headedness.   Psychiatric/Behavioral:  Negative for confusion and hallucinations.    All other systems reviewed and are negative.    PAST MED HX:  Past Medical History:   Diagnosis Date    Acid reflux     Anemia     Due to chronic blood loss - Chronic blood loss anemia    Apnea     Arthritis     Backache     CHF (congestive heart failure)     COPD (chronic obstructive pulmonary disease)     Severe    Degeneration of lumbar intervertebral disc     Drug therapy     Finding    Emphysema, unspecified     Esophageal reflux     Family history of malignant neoplasm of breast in first degree relative     Gall stones     GERD (gastroesophageal reflux disease)     H/O spinal fusion     H/O: drug dependency     Hearing loss     Heart disease     Hypertrophic cardiomyopathy     Insomnia     Mixed hyperlipidemia     Neurotic Depression     Recurrent major depressive episodes, moderate     Severe obesity     Tobacco use     History of       PAST SURG HX:  Past Surgical History:  "  Procedure Laterality Date    APPENDECTOMY      BACK SURGERY      BACK SURGERY      CARDIAC ABLATION      CARDIAC SURGERY      Cardiac Stent Catherization    CHOLECYSTECTOMY      Gall Bladder Surgery    HERNIA REPAIR      HIP SURGERY Left     LAPAROSCOPIC TUBAL LIGATION      NECK SURGERY      NECK SURGERY      ORTHOPEDIC SURGERY         FAM HX:  Family History   Problem Relation Age of Onset    Breast cancer Mother     Multiple sclerosis Mother     Thyroid disease Mother     Arthritis Maternal Grandmother     Heart attack Maternal Grandmother     Cancer Other        SOC HX:  Social History     Socioeconomic History    Marital status: Single   Tobacco Use    Smoking status: Former     Types: Cigarettes     Quit date: 7/11/2008     Years since quitting: 15.2    Smokeless tobacco: Never   Vaping Use    Vaping Use: Every day    Substances: Nicotine    Devices: Pre-filled or refillable cartridge   Substance and Sexual Activity    Alcohol use: No    Drug use: No    Sexual activity: Defer       PHYSICAL EXAM  /58 (BP Location: Right arm, Patient Position: Sitting)   Pulse 106   Temp 97.7 øF (36.5 øC) (Oral)   Resp 18   Ht 165.1 cm (65\")   Wt 82 kg (180 lb 11.2 oz)   SpO2 91%   BMI 30.07 kg/mý   Wt Readings from Last 3 Encounters:   10/04/23 82 kg (180 lb 11.2 oz)   09/25/23 78.5 kg (173 lb)   07/19/23 85.7 kg (189 lb)   ,body mass index is 30.07 kg/mý.  Physical Exam  Vitals and nursing note reviewed.   Constitutional:       Appearance: Normal appearance. She is normal weight. She is ill-appearing (chronically). She is not diaphoretic.      Comments: BMI 30.07   HENT:      Head: Normocephalic and atraumatic.      Right Ear: External ear normal.      Left Ear: External ear normal.      Nose: Nose normal.      Mouth/Throat:      Mouth: Mucous membranes are moist.      Pharynx: Oropharynx is clear.   Eyes:      Conjunctiva/sclera: Conjunctivae normal.      Pupils: Pupils are equal, round, and reactive to light. "   Neck:      Thyroid: No thyromegaly.   Cardiovascular:      Rate and Rhythm: Normal rate and regular rhythm.      Pulses: Normal pulses.      Heart sounds: Normal heart sounds.   Pulmonary:      Effort: Pulmonary effort is normal.      Breath sounds: Normal breath sounds.   Abdominal:      General: Abdomen is flat. Bowel sounds are normal. There is no distension.      Palpations: There is fluid wave.      Tenderness: There is abdominal tenderness (diffuse).      Comments: Body habitus limits physical exam   Musculoskeletal:         General: Normal range of motion.      Cervical back: Normal range of motion and neck supple.      Right lower leg: Edema present.      Left lower leg: Edema present.   Skin:     General: Skin is warm and dry.   Neurological:      General: No focal deficit present.      Mental Status: She is oriented to person, place, and time.   Psychiatric:         Mood and Affect: Mood normal.       Results Review:   I reviewed the patient's new clinical results.  I reviewed the patient's new imaging results and agree with the interpretation.  I reviewed the patient's other test results and agree with the interpretation    Lab Results   Component Value Date    WBC 9.18 10/04/2023    HGB 11.7 (L) 10/04/2023    HGB 14.6 10/03/2023    HGB 15.2 09/25/2023    HCT 34.3 10/04/2023    MCV 94.5 10/04/2023     10/04/2023       Lab Results   Component Value Date    INR 1.19 (H) 10/03/2023    INR 1.1 03/07/2023       Lab Results   Component Value Date    GLUCOSE 69 10/04/2023    BUN 20 10/04/2023    CREATININE 1.59 (H) 10/04/2023    EGFRIFNONA 64 03/24/2020    BCR 12.6 10/04/2023     (L) 10/04/2023    K 4.5 10/04/2023    CO2 16.0 (L) 10/04/2023    CALCIUM 8.7 10/04/2023    PROTENTOTREF 5.5 (L) 09/25/2023    ALBUMIN 2.3 (L) 10/04/2023    ALKPHOS 194 (H) 10/04/2023    BILITOT 0.9 10/04/2023    ALT 19 10/04/2023    AST 55 (H) 10/04/2023       ASSESSMENTS/PLANS    Decompensated SIMS cirrhosis with  ascites  Hyponatremia  Bilateral pleural effusion  Hypoalbuminemia   - MELD-Na Score 20 / Child Peraza Score 9, Class B   - Last Para: US guided paracentesis ordered for today    - HCC Surveillance: liver US 10/4   - EV Surveillance: will need EGD, likely in outpatient setting   - HE Management: N/A   - continue protonix 40mg daily   - pt given cirrhosis lifestyle instructions: avoid hepatotoxins, limit APAP < 2g per day, avoid ASA and NSAIDs, diet of 35-40kcal/day, protein 1.2-1.5g/kg/day   - will check Hep A/B immunity, vaccinate if needed via PCP   - obtain SURINDER, anti smooth muscle Ab, mitochondrial Ab   - continue empiric Rocephin, pending paracentesis fluid studies    I discussed the patient's findings and my recommendations with patient and nursing staff. Thank you very kindly for this consultation. Will continue to follow during this hospitalization.      Charlette Jonas PA-C  10/04/23  13:24 EDT

## 2023-10-04 NOTE — H&P
"    [facility] Medicine Services  HISTORY AND PHYSICAL    Patient Name: Laura Hui  : 1950  MRN: [id number]  Primary Care Physician: Carmella Barboza DO  Date of admission: 10/3/2023    Subjective   Subjective     Chief Complaint:  SOB    HPI:  Laura Hui is a 73 y.o. female with a past medical history significant for hypertension, HLD, HFpEF, PVD, COPD, chronic back pain, GERD, anemia, and anxiety/depression. She presents today with complaints of generalized weakness and progressively worsening SOB going on for the past 2-3 days. Dyspnea worse with exertion. She has some bilateral lower extremity edema that has improved from a month ago, but notes increasing abdominal distension. States she feels like her abdomen is \"sitting on her chest\". Today she was largely immobile due to weakness. States her legs keep giving out on her and she required assistance getting off the commode today. Patient and family were concerned and called EMS.   Records reviewed indicate that patient was admitted to [facility] on 9/10/2023 and discharged on 2023 for acute decompensated congestive heart failure. She was evaluated by cardiology in the hospital and had echo performed which showed EF of 70 to 75% with normal wall motion and grade 1 diastolic dysfunction. Patient had significant diuretic response with bump of creatinine to 1.3. She was also found to have a type II non-STEMI, and troponins were trended to improvement. Hospital course was complicated by right lower extremity cellulitis for which she has completed a course of Cephalexin as of yesterday. Reports significant improvement. Patient volunteers that she was ultimately discharged on a \"stronger water pill\". However feels like this is no longer working. Will obtain medical records.  Currently there are no complaints of fever, cough, congestion, SOB, or chest pain. No abdominal pain or N/V/D. No dysuria or flank. No " headache or focal weakness/parathesias. Will admit to inpatient for further evaluation and treatment.      Review of Systems   Constitutional:  Negative for chills, fatigue and fever.   HENT:  Negative for congestion and trouble swallowing.    Eyes:  Negative for photophobia and visual disturbance.   Respiratory:  Positive for shortness of breath. Negative for cough.    Cardiovascular:  Positive for leg swelling. Negative for chest pain.   Gastrointestinal:  Positive for abdominal distention. Negative for abdominal pain, diarrhea, nausea and vomiting.   Endocrine: Negative for cold intolerance and heat intolerance.   Genitourinary:  Negative for dysuria and flank pain.   Musculoskeletal:  Positive for gait problem. Negative for back pain.   Skin:  Positive for color change. Negative for wound.   Allergic/Immunologic: Positive for immunocompromised state.   Neurological:  Positive for weakness. Negative for dizziness and headaches.   Hematological:  Negative for adenopathy.   Psychiatric/Behavioral:  Negative for agitation and confusion.               Personal History     Past Medical History:   Diagnosis Date    Acid reflux     Anemia     Due to chronic blood loss - Chronic blood loss anemia    Apnea     Arthritis     Backache     CHF (congestive heart failure)     COPD (chronic obstructive pulmonary disease)     Severe    Degeneration of lumbar intervertebral disc     Drug therapy     Finding    Emphysema, unspecified     Esophageal reflux     Family history of malignant neoplasm of breast in first degree relative     Gall stones     GERD (gastroesophageal reflux disease)     H/O spinal fusion     H/O: drug dependency     Hearing loss     Heart disease     Hypertrophic cardiomyopathy     Insomnia     Mixed hyperlipidemia     Neurotic Depression     Recurrent major depressive episodes, moderate     Severe obesity     Tobacco use     History of             Past Surgical History:   Procedure Laterality Date     APPENDECTOMY      BACK SURGERY      BACK SURGERY      CARDIAC ABLATION      CARDIAC SURGERY      Cardiac Stent Catherization    CHOLECYSTECTOMY      Gall Bladder Surgery    HERNIA REPAIR      HIP SURGERY Left     LAPAROSCOPIC TUBAL LIGATION      NECK SURGERY      NECK SURGERY      ORTHOPEDIC SURGERY         Family History: family history includes Arthritis in her maternal grandmother; Breast cancer in her mother; Cancer in an other family member; Heart attack in her maternal grandmother; Multiple sclerosis in her mother; Thyroid disease in her mother.     Social History:  reports that she quit smoking about 15 years ago. Her smoking use included cigarettes. She has never used smokeless tobacco. She reports that she does not drink alcohol and does not use drugs.  Social History     Social History Narrative    Not on file       Medications:  Fluticasone-Umeclidin-Vilant, albuterol, albuterol sulfate HFA, aspirin, atorvastatin, bumetanide, citalopram, fluticasone, metoprolol tartrate, multivitamin with fluoride/iron, pantoprazole, potassium chloride, roflumilast, and traZODone    Allergies   Allergen Reactions    Sulfa Antibiotics Rash       Objective   Objective     Vital Signs:   Temp:  [97.8 øF (36.6 øC)-97.9 øF (36.6 øC)] 97.8 øF (36.6 øC)  Heart Rate:  [76-86] 82  Resp:  [14-19] 19  BP: ()/() 97/51  Flow (L/min):  [2-3] 3    Physical Exam   Constitutional: Awake, alert  Eyes: PERRLA, sclerae anicteric, no conjunctival injection  HENT: NCAT, mucous membranes moist  Neck: Supple, no thyromegaly, no lymphadenopathy, trachea midline  Respiratory: Clear to auscultation bilaterally, nonlabored respirations   Cardiovascular: RRR, no murmurs, rubs, or gallops, palpable pedal pulses bilaterally  Gastrointestinal: Positive bowel sounds, soft, nontender, distended, ascites  Musculoskeletal: trace, pitting, BLE edema no clubbing or cyanosis to extremities  Erythema to anterior aspect of right leg, non tender.  improving  Psychiatric: Appropriate affect, cooperative  Neurologic: Oriented x 3, strength symmetric in all extremities, Cranial Nerves grossly intact to confrontation, speech clear  Skin: No rashes      Result Review:  I have personally reviewed the results from the time of this admission to 10/3/2023 22:37 EDT and agree with these findings:  [x]  Laboratory list / accordion  []  Microbiology  []  Radiology  []  EKG/Telemetry   []  Cardiology/Vascular   []  Pathology  [x]  Old records  []  Other:  Most notable findings include: hypotensive 97/51. Hypoxic to 88% on RA. Sodium 128. CO 19. Creatinine 1.41. alk phos 262. AST 71. Procal 0.97. WBC 10.93. CT chest bilateral effusions.    LAB RESULTS:      Lab 10/03/23  1804 10/03/23  1720   WBC  --  10.93*   HEMOGLOBIN  --  14.6   HEMATOCRIT  --  42.2   PLATELETS  --  213   NEUTROS ABS  --  9.28*   IMMATURE GRANS (ABS)  --  0.05   LYMPHS ABS  --  0.77   MONOS ABS  --  0.80   EOS ABS  --  0.02   MCV  --  94.4   PROCALCITONIN  --  0.97*   LACTATE 1.9  --    PROTIME  --  15.2*   D DIMER QUANT  --  2.84*         Lab 10/03/23  1720   SODIUM 128*   POTASSIUM 5.0   CHLORIDE 96*   CO2 19.0*   ANION GAP 13.0   BUN 21   CREATININE 1.41*   EGFR 39.5*   GLUCOSE 91   CALCIUM 9.1   MAGNESIUM 2.0   TSH 4.920*         Lab 10/03/23  1720   TOTAL PROTEIN 5.5*   ALBUMIN 2.3*   GLOBULIN 3.2   ALT (SGPT) 17   AST (SGOT) 71*   BILIRUBIN 1.1   ALK PHOS 262*         Lab 10/03/23  1950/23  1720   PROBNP  --  10,567.0*   HSTROP T 74* 80*   PROTIME  --  15.2*   INR  --  1.19*                 Brief Urine Lab Results  (Last result in the past 365 days)        Color   Clarity   Blood   Leuk Est   Nitrite   Protein   CREAT   Urine HCG        10/03/23 1839 Dark Yellow   Clear   Negative   Negative   Negative   Negative                 Microbiology Results (last 10 days)       Procedure Component Value - Date/Time    Respiratory Panel PCR w/COVID-19(SARS-CoV-2) JEN/BETSY/ELIEZER/PAD/COR/MAD/URSULA  In-House, NP Swab in [facility], 3-4 HR TAT - Swab, Nasopharynx [[id number]]  (Normal) Collected: 10/03/23 1726    Lab Status: Final result Specimen: Swab from Nasopharynx Updated: 10/03/23 1825     ADENOVIRUS, PCR Not Detected     Coronavirus 229E Not Detected     Coronavirus HKU1 Not Detected     Coronavirus NL63 Not Detected     Coronavirus OC43 Not Detected     COVID19 Not Detected     Human Metapneumovirus Not Detected     Human Rhinovirus/Enterovirus Not Detected     Influenza A PCR Not Detected     Influenza B PCR Not Detected     Parainfluenza Virus 1 Not Detected     Parainfluenza Virus 2 Not Detected     Parainfluenza Virus 3 Not Detected     Parainfluenza Virus 4 Not Detected     RSV, PCR Not Detected     Bordetella pertussis pcr Not Detected     Bordetella parapertussis PCR Not Detected     Chlamydophila pneumoniae PCR Not Detected     Mycoplasma pneumo by PCR Not Detected    Narrative:      In the setting of a positive respiratory panel with a viral infection PLUS a negative procalcitonin without other underlying concern for bacterial infection, consider observing off antibiotics or discontinuation of antibiotics and continue supportive care. If the respiratory panel is positive for atypical bacterial infection (Bordetella pertussis, Chlamydophila pneumoniae, or Mycoplasma pneumoniae), consider antibiotic de-escalation to target atypical bacterial infection.            CT Head Without Contrast    Result Date: 10/3/2023  CT HEAD WO CONTRAST Date of Exam: 10/3/2023 7:07 PM EDT Indication: gen weakness. Comparison: None available. Technique: Axial CT images were obtained of the head without contrast administration.  Automated exposure control and iterative construction methods were used. FINDINGS: There is no evidence for acute intracranial hemorrhage. No definitive acute focal ischemia is identified. Nonspecific white matter changes are noted likely related to chronic small vessel ischemic changes and  age-related changes. Associated diffuse volume loss is observed. There is no evidence for abnormal cerebral edema. There is no mass effect or midline shift. The ventricular system is nondilated. The basal cisterns are patent. The skull is intact. The paranasal sinuses and mastoid air cells are  clear.     Impression: 1.No evidence for acute intracranial abnormality. 2.Nonspecific white matter changes are noted with associated diffuse volume loss. These findings are likely related to chronic small vessel ischemic changes and/or age-related changes. Electronically Signed: Peter Don MD  10/3/2023 7:13 PM EDT  Workstation ID: [id number]    XR Chest 1 View    Result Date: 10/3/2023  XR CHEST 1 VW Date of Exam: 10/3/2023 4:12 PM CDT Indication: gen weakness Comparison: 7/19/2023 Findings: Cardiomediastinal silhouette is unremarkable. There is left mid to lower lung airspace disease suggestive of pneumonia. Small to moderate left and small right effusions. No acute osseous abnormality identified.     Impression: Impression: 1.Left mid to lower lung airspace disease with small to moderate effusion suggestive of pneumonia. Correlate with symptoms. 2.Persistent small right effusion. Electronically Signed: Isaac Smalls MD  10/3/2023 4:28 PM CDT  Workstation ID: [id number]    CT Angiogram Chest    Result Date: 10/3/2023  CT ANGIOGRAM CHEST Date of Exam: 10/3/2023 7:07 PM EDT Indication: SOB, hypoxic, elevated dimer. Comparison: None available. Technique: CTA of the chest was performed after the uneventful intravenous administration of 85 mL Isovue 370. Reconstructed coronal and sagittal images were also obtained. In addition, a 3-D volume rendered image was created for interpretation. Automated exposure control and iterative reconstruction methods were used. Findings: There is generally good contrast opacification of the pulmonary arteries and no evidence of embolic disease. No evidence of thoracic aortic aneurysm or  dissection is seen. There is probably high-grade origin stenosis of the left subclavian artery, with very dense calcification of the lumen on image 26 series 4. No pericardial effusion or mediastinal adenopathy is seen. There is diffuse coronary artery calcium. Multiple macrocalcifications are seen in the normal sized thyroid There is a large free-flowing left pleural effusion and moderate free-flowing right pleural effusion. There is nearly complete atelectasis of the left lower lobe and milder right lower lobe atelectasis. Mild micro bullous change is seen of the upper lobes. No other evidence of active pulmonary parenchymal disease is appreciated. There is a calcified right middle lobe granuloma. The airways appear to be normally patent. Included images of the upper abdomen show extensive ascites. Liver morphology suggests cirrhosis, with marked enlargement of the left liver lobe and small right liver lobe. There appear to be clips in the gallbladder fossa. Spleen is normal in size. Bony structures appear to be intact.     Impression: Impression: 1. No evidence of pulmonary embolic disease. 2. Large free-flowing left pleural effusion and moderate right effusion. 3. Extensive atelectasis of the left lower lobe due to effusion and mild right lower lobe atelectasis. 4. Liver morphology consistent with cirrhosis. Upper abdominal ascites, on these limited images, appears to be extensive. 5. Incidentally noted heavy calcification of the left subclavian artery origin, suggesting high-grade stenosis, or possibly occlusion. Electronically Signed: Brian Sutherland MD  10/3/2023 7:29 PM EDT  Workstation ID: [id number]         Assessment & Plan   Assessment & Plan       Acute respiratory failure with hypoxia    Chronic obstructive pulmonary disease    CHF (congestive heart failure)    HEIDY (acute kidney injury)    Hyponatremia    Cirrhosis of liver    Cellulitis of right lower extremity    Atherosclerosis of native arteries of  extremities with intermittent claudication, other extremity    Hypertension    Generalized weakness    GERD (gastroesophageal reflux disease)    Hyperlipidemia    Anemia    Anxiety and depression    Chronic back pain    73 to female here with dyspnea x 3 days and weakness. Probable new diagnosis of cirrhosis with ascites and pleural effusions.      Left > right pleural effusion  Cirrhosis/ascites (CTA chest) - new finding   Hypoalbuminemia , 2.3   - 88% on RA  - CTA chest demonstrates mod left effusion and smaller R effusion.    - Also shows apparent cirrhotic liver with large ascites  - pt reports history of fatty liver. Denies alcoholism.  -  check PT/INR  - obtain US liver  - consult GI input  - consider diagnostic paracentesis    Acute onset weakness  - until a couple weeks ago she could walk without a cane or walker  - now using a wheelchair  - this may be muscle wasting + effect of water weight and ascites + hypotension   - PT OT   -  on high protein diet     Lightheadedness  Borderline hypotn  - check orthostatics in AM   - consider midodrine, or adjust diuretics,     Dyspnea and hypoxia   - she was recently admitted at [facility] and diagnosed w CHF, but had echo showing EF 75% and had water pill increased  - likely the cause is pleural effusions from liver dx/low albumin.    - will obtain blood cultures, and empirically started rocephin and doxycyline   - however there is little evidence for infxn; consider rapid wean.       HEIDY  - Cr baseline 0.9, now 1.4  - ? Cardiorenal vs effect of overdiuresis (diuretic recently increased) or ATN   - avoid nephrotoxins  - obtain UA  - bladder scan and straight cath as needed    -   Possible HFpEF (vs ascites and hypoalbuminemia causing pl effusions)   HTN HLD  - recent admission with echo at [facility]:  records from OSH pending  - holding on additional Bumex due to hypotension  - will administer trail of Albumin X1, serum albumin low at 2.3  - continue statin  - plan to  resume Bumex 1 mg BID and Lopressor in am pending pressure  - daily weights  - strict I&O    COPD, emphysema   - no on supplemental oxygen at home  - former smoker, now vapes. Encouraged cessation  - continue with inhalers     Hyponatremia hypervolemic   - new finding.   - check urine sodium, urine osmolality, serum osmolality  - AM cortisol  - monitor chemistry closely    PVD  - incidental finding of atherosclerosis of left subclavian artery, suggesting high-grade stenosis, or possibly occlusion.   - consider vascular input vs outpatient w.u    Generalized weakness  - PT/OT    Anemia  - H/H stable and at baseline    GERD  - PPI    Anxiety/depression  - Celexa, Trazodone, consider holding pending progression of hyponatremia    DVT prophylaxis: MARIE    CODE STATUS:  full code  Level Of Support Discussed With: Patient  Code Status (Patient has no pulse and is not breathing): CPR (Attempt to Resuscitate)  Medical Interventions (Patient has pulse or is breathing): Full Support      Expected Discharge  TBD      This note has been completed as part of a split-shared workflow.     Signature: Electronically signed by Diaz Michelle PA-C, 10/03/23, 10:55 PM EDT    Total time spent: 90 minutes  Time spent includes time reviewing chart, face-to-face time, counseling patient/family/caregiver, ordering medications/tests/procedures, communicating with other health care professionals, documenting clinical information in the electronic health record, and coordination of care.      Attending   Admission Attestation       I have performed an independent face-to-face diagnostic evaluation including performing an independent physical examination.  The documented plan of care above was reviewed and developed with the advanced practice clinician (APC).  I have updated the HPI as appropriate.    Brief HPI    74 yo with PMH  of  HTN HL HFpEF, PVD, COPD, chronic back pain, GERD, anemia, and anxiety/depression.  A month ago she was at [facility]  with diffuse edema, was diagnosed with CHF and diuresed.  However, recently she has developed dyspnea and abdominal swelling and lightheadedness with standing.  She used to walk without a walker or cane - now she is too weak to rise to her feet.  Abdomen is swelling.  Her legs have been giving out .  She called EMS to bring her to ED.          Attending Physical Exam:    Gen: woman lying in bed, NAD, looks chronically ill but a good historian, very pleasant   Neuro: alert and oriented, clear speech, follows commands, grossly nonfocal, remembers dates and details of recent medical care   HEENT:  NC/AT PER  Neck:  Supple, no LAD  Heart RRR  Abd:  Mod distended, soft, not tympanic and not tender  Extrem:  No c/c 1+ edema            Assessment and Plan:    See assessment and plan documented by APC above and updated/edited by me as appropriate.    Suzan Rice MD  10/03/23

## 2023-10-04 NOTE — PLAN OF CARE
Problem: Adult Inpatient Plan of Care  Goal: Absence of Hospital-Acquired Illness or Injury  Intervention: Identify and Manage Fall Risk  Recent Flowsheet Documentation  Taken 10/4/2023 0400 by Chichi Patton RN  Safety Promotion/Fall Prevention:   activity supervised   assistive device/personal items within reach   clutter free environment maintained   fall prevention program maintained   lighting adjusted   room organization consistent   safety round/check completed  Taken 10/4/2023 0200 by Chichi Patton RN  Safety Promotion/Fall Prevention:   safety round/check completed   room organization consistent   fall prevention program maintained   clutter free environment maintained   activity supervised   assistive device/personal items within reach  Taken 10/4/2023 0000 by Chichi Patton RN  Safety Promotion/Fall Prevention:   safety round/check completed   room organization consistent   lighting adjusted   fall prevention program maintained   assistive device/personal items within reach   activity supervised  Taken 10/3/2023 2300 by Chichi Patton RN  Safety Promotion/Fall Prevention:   safety round/check completed   room organization consistent   lighting adjusted   fall prevention program maintained   clutter free environment maintained   assistive device/personal items within reach   activity supervised  Taken 10/3/2023 2100 by Chichi Patton RN  Safety Promotion/Fall Prevention:   safety round/check completed   room organization consistent   lighting adjusted   fall prevention program maintained   clutter free environment maintained   assistive device/personal items within reach   activity supervised  Intervention: Prevent Skin Injury  Recent Flowsheet Documentation  Taken 10/4/2023 0400 by Chichi Patton RN  Body Position: position changed independently  Skin Protection:   adhesive use limited   incontinence pads utilized   transparent dressing maintained  Taken 10/4/2023 0200 by Chichi Patton  RN  Body Position: position changed independently  Skin Protection:   adhesive use limited   incontinence pads utilized   transparent dressing maintained  Taken 10/4/2023 0000 by Chichi Patton RN  Body Position: position changed independently  Skin Protection:   adhesive use limited   transparent dressing maintained  Taken 10/3/2023 2300 by Chichi Patton RN  Body Position: position changed independently  Skin Protection:   adhesive use limited   incontinence pads utilized   tubing/devices free from skin contact  Taken 10/3/2023 2100 by Chichi Patton RN  Body Position:   position changed independently   turned  Skin Protection:   adhesive use limited   incontinence pads utilized   transparent dressing maintained   tubing/devices free from skin contact  Intervention: Prevent and Manage VTE (Venous Thromboembolism) Risk  Recent Flowsheet Documentation  Taken 10/4/2023 0400 by Chichi Patton RN  Activity Management: activity encouraged  Taken 10/4/2023 0200 by Chichi Patton RN  Activity Management: activity encouraged  Taken 10/4/2023 0000 by Chichi Patton RN  Activity Management: activity encouraged  Taken 10/3/2023 2300 by Chichi Patton RN  Activity Management: activity encouraged  Taken 10/3/2023 2100 by Chichi Patton RN  Activity Management: activity encouraged  Intervention: Prevent Infection  Recent Flowsheet Documentation  Taken 10/4/2023 0400 by Chichi Patton RN  Infection Prevention:   single patient room provided   rest/sleep promoted   hand hygiene promoted  Taken 10/4/2023 0200 by Chichi Patton RN  Infection Prevention:   hand hygiene promoted   rest/sleep promoted   single patient room provided  Taken 10/4/2023 0000 by Chichi Patton RN  Infection Prevention:   single patient room provided   rest/sleep promoted   hand hygiene promoted  Taken 10/3/2023 2300 by Chichi Patton RN  Infection Prevention:   hand hygiene promoted   rest/sleep promoted   single patient room  provided  Taken 10/3/2023 2100 by Chichi Patton, RN  Infection Prevention:   rest/sleep promoted   single patient room provided   hand hygiene promoted  Goal: Readiness for Transition of Care  Intervention: Mutually Develop Transition Plan  Recent Flowsheet Documentation  Taken 10/3/2023 2133 by Chichi Patton, RN  Transportation Anticipated: family or friend will provide  Patient/Family Anticipated Services at Transition:   Patient/Family Anticipates Transition to: home with family  Taken 10/3/2023 2131 by Chichi Patton, RN  Equipment Currently Used at Home:   wheelchair   walker, rolling   Goal Outcome Evaluation:

## 2023-10-05 ENCOUNTER — APPOINTMENT (OUTPATIENT)
Dept: GENERAL RADIOLOGY | Facility: HOSPITAL | Age: 73
DRG: 432 | End: 2023-10-05
Payer: MEDICARE

## 2023-10-05 PROBLEM — E43 SEVERE MALNUTRITION: Status: ACTIVE | Noted: 2023-10-05

## 2023-10-05 LAB
ALBUMIN SERPL-MCNC: 2.7 G/DL (ref 3.5–5.2)
ALBUMIN/GLOB SERPL: 1.4 G/DL
ALP SERPL-CCNC: 180 U/L (ref 39–117)
ALPHA1 GLOB MFR UR ELPH: 107 MG/DL (ref 90–200)
ALT SERPL W P-5'-P-CCNC: 18 U/L (ref 1–33)
ANION GAP SERPL CALCULATED.3IONS-SCNC: 15 MMOL/L (ref 5–15)
AST SERPL-CCNC: 51 U/L (ref 1–32)
BILIRUB SERPL-MCNC: 0.8 MG/DL (ref 0–1.2)
BILIRUB UR QL STRIP: NEGATIVE
BUN SERPL-MCNC: 24 MG/DL (ref 8–23)
BUN/CREAT SERPL: 11.8 (ref 7–25)
CALCIUM SPEC-SCNC: 9.1 MG/DL (ref 8.6–10.5)
CHLORIDE SERPL-SCNC: 95 MMOL/L (ref 98–107)
CLARITY UR: CLEAR
CO2 SERPL-SCNC: 17 MMOL/L (ref 22–29)
COLOR UR: YELLOW
CREAT SERPL-MCNC: 2.03 MG/DL (ref 0.57–1)
DEPRECATED RDW RBC AUTO: 56.6 FL (ref 37–54)
EGFRCR SERPLBLD CKD-EPI 2021: 25.5 ML/MIN/1.73
ERYTHROCYTE [DISTWIDTH] IN BLOOD BY AUTOMATED COUNT: 16 % (ref 12.3–15.4)
GLOBULIN UR ELPH-MCNC: 1.9 GM/DL
GLUCOSE SERPL-MCNC: 77 MG/DL (ref 65–99)
GLUCOSE UR STRIP-MCNC: NEGATIVE MG/DL
HCT VFR BLD AUTO: 32.7 % (ref 34–46.6)
HGB BLD-MCNC: 11.2 G/DL (ref 12–15.9)
HGB UR QL STRIP.AUTO: NEGATIVE
KETONES UR QL STRIP: NEGATIVE
LEUKOCYTE ESTERASE UR QL STRIP.AUTO: NEGATIVE
MCH RBC QN AUTO: 32.7 PG (ref 26.6–33)
MCHC RBC AUTO-ENTMCNC: 34.3 G/DL (ref 31.5–35.7)
MCV RBC AUTO: 95.3 FL (ref 79–97)
NITRITE UR QL STRIP: NEGATIVE
PH UR STRIP.AUTO: <=5 [PH] (ref 5–8)
PLATELET # BLD AUTO: 127 10*3/MM3 (ref 140–450)
PMV BLD AUTO: 9.4 FL (ref 6–12)
POTASSIUM SERPL-SCNC: 4.6 MMOL/L (ref 3.5–5.2)
PROT SERPL-MCNC: 4.6 G/DL (ref 6–8.5)
PROT UR QL STRIP: NEGATIVE
RBC # BLD AUTO: 3.43 10*6/MM3 (ref 3.77–5.28)
SODIUM SERPL-SCNC: 127 MMOL/L (ref 136–145)
SODIUM UR-SCNC: <20 MMOL/L
SP GR UR STRIP: 1.03 (ref 1–1.03)
UROBILINOGEN UR QL STRIP: ABNORMAL
WBC NRBC COR # BLD: 9.13 10*3/MM3 (ref 3.4–10.8)

## 2023-10-05 PROCEDURE — 85027 COMPLETE CBC AUTOMATED: CPT | Performed by: INTERNAL MEDICINE

## 2023-10-05 PROCEDURE — 80053 COMPREHEN METABOLIC PANEL: CPT | Performed by: INTERNAL MEDICINE

## 2023-10-05 PROCEDURE — 84300 ASSAY OF URINE SODIUM: CPT | Performed by: INTERNAL MEDICINE

## 2023-10-05 PROCEDURE — 97166 OT EVAL MOD COMPLEX 45 MIN: CPT

## 2023-10-05 PROCEDURE — 25010000002 ALBUMIN HUMAN 25% PER 50 ML: Performed by: INTERNAL MEDICINE

## 2023-10-05 PROCEDURE — 25010000002 CEFTRIAXONE PER 250 MG: Performed by: PHYSICIAN ASSISTANT

## 2023-10-05 PROCEDURE — P9047 ALBUMIN (HUMAN), 25%, 50ML: HCPCS | Performed by: INTERNAL MEDICINE

## 2023-10-05 PROCEDURE — 25010000002 MORPHINE PER 10 MG: Performed by: INTERNAL MEDICINE

## 2023-10-05 PROCEDURE — 25010000002 HEPARIN (PORCINE) PER 1000 UNITS: Performed by: PHYSICIAN ASSISTANT

## 2023-10-05 PROCEDURE — 99232 SBSQ HOSP IP/OBS MODERATE 35: CPT | Performed by: INTERNAL MEDICINE

## 2023-10-05 PROCEDURE — 94799 UNLISTED PULMONARY SVC/PX: CPT

## 2023-10-05 PROCEDURE — 71045 X-RAY EXAM CHEST 1 VIEW: CPT

## 2023-10-05 PROCEDURE — 81003 URINALYSIS AUTO W/O SCOPE: CPT | Performed by: INTERNAL MEDICINE

## 2023-10-05 RX ORDER — ALBUMIN (HUMAN) 12.5 G/50ML
25 SOLUTION INTRAVENOUS EVERY 6 HOURS
Status: COMPLETED | OUTPATIENT
Start: 2023-10-05 | End: 2023-10-06

## 2023-10-05 RX ORDER — HYDROXYZINE HYDROCHLORIDE 25 MG/1
25 TABLET, FILM COATED ORAL 3 TIMES DAILY PRN
Status: DISCONTINUED | OUTPATIENT
Start: 2023-10-05 | End: 2023-10-13 | Stop reason: HOSPADM

## 2023-10-05 RX ORDER — MIDODRINE HYDROCHLORIDE 10 MG/1
10 TABLET ORAL
Status: DISCONTINUED | OUTPATIENT
Start: 2023-10-05 | End: 2023-10-13 | Stop reason: HOSPADM

## 2023-10-05 RX ORDER — MORPHINE SULFATE 2 MG/ML
1 INJECTION, SOLUTION INTRAMUSCULAR; INTRAVENOUS EVERY 4 HOURS PRN
Status: DISCONTINUED | OUTPATIENT
Start: 2023-10-05 | End: 2023-10-07

## 2023-10-05 RX ORDER — ALBUMIN (HUMAN) 12.5 G/50ML
25 SOLUTION INTRAVENOUS ONCE
Status: COMPLETED | OUTPATIENT
Start: 2023-10-05 | End: 2023-10-05

## 2023-10-05 RX ADMIN — MIDODRINE HYDROCHLORIDE 10 MG: 10 TABLET ORAL at 12:56

## 2023-10-05 RX ADMIN — SENNOSIDES AND DOCUSATE SODIUM 2 TABLET: 8.6; 5 TABLET ORAL at 21:51

## 2023-10-05 RX ADMIN — ALBUMIN (HUMAN) 25 G: 0.25 INJECTION, SOLUTION INTRAVENOUS at 08:47

## 2023-10-05 RX ADMIN — HEPARIN SODIUM 5000 UNITS: 5000 INJECTION, SOLUTION INTRAVENOUS; SUBCUTANEOUS at 21:51

## 2023-10-05 RX ADMIN — HYDROCODONE BITARTRATE AND ACETAMINOPHEN 1 TABLET: 5; 325 TABLET ORAL at 00:24

## 2023-10-05 RX ADMIN — ALBUMIN (HUMAN) 25 G: 0.25 INJECTION, SOLUTION INTRAVENOUS at 15:34

## 2023-10-05 RX ADMIN — BUDESONIDE AND FORMOTEROL FUMARATE DIHYDRATE 2 PUFF: 160; 4.5 AEROSOL RESPIRATORY (INHALATION) at 09:33

## 2023-10-05 RX ADMIN — HEPARIN SODIUM 5000 UNITS: 5000 INJECTION, SOLUTION INTRAVENOUS; SUBCUTANEOUS at 08:52

## 2023-10-05 RX ADMIN — TRAZODONE HYDROCHLORIDE 150 MG: 50 TABLET ORAL at 21:51

## 2023-10-05 RX ADMIN — Medication 250 MG: at 08:51

## 2023-10-05 RX ADMIN — MIDODRINE HYDROCHLORIDE 10 MG: 10 TABLET ORAL at 17:11

## 2023-10-05 RX ADMIN — MIDODRINE HYDROCHLORIDE 5 MG: 5 TABLET ORAL at 07:55

## 2023-10-05 RX ADMIN — PANTOPRAZOLE SODIUM 40 MG: 40 TABLET, DELAYED RELEASE ORAL at 07:55

## 2023-10-05 RX ADMIN — MORPHINE SULFATE 1 MG: 2 INJECTION, SOLUTION INTRAMUSCULAR; INTRAVENOUS at 14:25

## 2023-10-05 RX ADMIN — Medication 250 MG: at 21:51

## 2023-10-05 RX ADMIN — Medication 10 ML: at 08:51

## 2023-10-05 RX ADMIN — CITALOPRAM HYDROBROMIDE 20 MG: 20 TABLET ORAL at 08:51

## 2023-10-05 RX ADMIN — SENNOSIDES AND DOCUSATE SODIUM 2 TABLET: 8.6; 5 TABLET ORAL at 08:51

## 2023-10-05 RX ADMIN — HYDROCODONE BITARTRATE AND ACETAMINOPHEN 1 TABLET: 5; 325 TABLET ORAL at 08:56

## 2023-10-05 RX ADMIN — TIOTROPIUM BROMIDE INHALATION SPRAY 2 PUFF: 3.12 SPRAY, METERED RESPIRATORY (INHALATION) at 09:33

## 2023-10-05 RX ADMIN — DOXYCYCLINE 100 MG: 100 INJECTION, POWDER, LYOPHILIZED, FOR SOLUTION INTRAVENOUS at 05:34

## 2023-10-05 RX ADMIN — HYDROXYZINE HYDROCHLORIDE 25 MG: 25 TABLET, FILM COATED ORAL at 14:25

## 2023-10-05 RX ADMIN — ROFLUMILAST 250 MCG: 500 TABLET ORAL at 08:51

## 2023-10-05 RX ADMIN — RIFAXIMIN 550 MG: 550 TABLET ORAL at 15:54

## 2023-10-05 RX ADMIN — Medication 10 ML: at 21:56

## 2023-10-05 RX ADMIN — CEFTRIAXONE 2000 MG: 2 INJECTION, POWDER, FOR SOLUTION INTRAMUSCULAR; INTRAVENOUS at 00:24

## 2023-10-05 RX ADMIN — ALBUMIN (HUMAN) 25 G: 0.25 INJECTION, SOLUTION INTRAVENOUS at 21:52

## 2023-10-05 RX ADMIN — BUDESONIDE AND FORMOTEROL FUMARATE DIHYDRATE 2 PUFF: 160; 4.5 AEROSOL RESPIRATORY (INHALATION) at 22:03

## 2023-10-05 RX ADMIN — ATORVASTATIN CALCIUM 40 MG: 40 TABLET, FILM COATED ORAL at 21:51

## 2023-10-05 RX ADMIN — DOXYCYCLINE 100 MG: 100 INJECTION, POWDER, LYOPHILIZED, FOR SOLUTION INTRAVENOUS at 17:07

## 2023-10-05 RX ADMIN — ASPIRIN 81 MG CHEWABLE TABLET 81 MG: 81 TABLET CHEWABLE at 08:51

## 2023-10-05 NOTE — PROGRESS NOTES
Nutrition Services    Patient Name:  Laura Hui  YOB: 1950  MRN: [id number]  Admit Date:  10/3/2023    Pt gives menu choices for B & L 10/6 - allows does not usually eat bfst. Will try cereal & banana; for lunch soup and fruit plate.    Electronically signed by:  Cristela Tolbert RD  10/05/23 19:20 EDT

## 2023-10-05 NOTE — PROGRESS NOTES
"GI Daily Progress Note  Subjective:    Chief Complaint: Follow-up decompensated cirrhosis.    Patient has vague abdominal discomfort with truncal movements.  Less abdominal discomfort since paracentesis yesterday.  Has very little urine output.  Denies nausea.  No signs of GI bleeding.    Objective:    BP 99/48   Pulse 106   Temp 97.9 øF (36.6 øC) (Oral)   Resp 18   Ht 165.1 cm (65\")   Wt 79.6 kg (175 lb 6.4 oz)   SpO2 95%   BMI 29.19 kg/mý     Physical Exam  Constitutional:       General: She is not in acute distress.     Appearance: She is ill-appearing. She is not toxic-appearing or diaphoretic.   HENT:      Head: Normocephalic.      Nose: Nose normal. No congestion.      Mouth/Throat:      Pharynx: No oropharyngeal exudate.   Eyes:      General: No scleral icterus.  Cardiovascular:      Rate and Rhythm: Normal rate.      Pulses: Normal pulses.   Pulmonary:      Effort: Pulmonary effort is normal. No respiratory distress.   Abdominal:      General: Bowel sounds are normal. There is no distension.      Palpations: Abdomen is soft.      Tenderness: There is no abdominal tenderness. There is no guarding.   Musculoskeletal:      Cervical back: Normal range of motion.      Comments: Trace ankle edema. +Sarcopenia.   Skin:     Findings: Erythema present.      Comments: Mild erythema on lower extremities, no erysipelas.   Neurological:      General: No focal deficit present.      Mental Status: She is alert and oriented to person, place, and time.   Psychiatric:         Mood and Affect: Mood normal.         Behavior: Behavior normal.       Lab  Lab Results   Component Value Date    WBC 9.13 10/05/2023    HGB 11.2 (L) 10/05/2023    HGB 11.7 (L) 10/04/2023    HGB 14.6 10/03/2023    MCV 95.3 10/05/2023     (L) 10/05/2023    INR 1.19 (H) 10/03/2023    INR 1.1 03/07/2023       Lab Results   Component Value Date    GLUCOSE 77 10/05/2023    BUN 24 (H) 10/05/2023    CREATININE 2.03 (H) 10/05/2023    EGFRIFNONA 64 " 03/24/2020    BCR 11.8 10/05/2023     (L) 10/05/2023    K 4.6 10/05/2023    CO2 17.0 (L) 10/05/2023    CALCIUM 9.1 10/05/2023    PROTENTOTREF 5.5 (L) 09/25/2023    ALBUMIN 2.7 (L) 10/05/2023    ALKPHOS 180 (H) 10/05/2023    BILITOT 0.8 10/05/2023    ALT 18 10/05/2023    AST 51 (H) 10/05/2023      Latest Reference Range & Units 10/04/23 17:54   Hep B S Ab Non-Reactive  Non-Reactive      Latest Reference Range & Units 10/03/23 18:00   Sodium, Urine mmol/L <20     Assessment:    1.  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 Does not have immunity to Hep B.  2.  Ascites.  Status post 3.3 L paracentesis yesterday.  No evidence for SBP.  3.  Large left pleural effusion.  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  4.  Mild hepatic encephalopathy.  Patient is generally aware of when she is confused.  She has found herself forgetting things, and placing toilet paper rolls in the refrigerator.  5.  Acute kidney injury.  Probable hepatorenal syndrome.  6.  Severe deconditioning.  7.  Sarcopenia.    Plan:    >> Hepatitis B vaccine #1 today.  >> Awaiting hepatitis A serology to assess immunity.   >> Begin Xifaxan 550 mg twice daily.  >> Awaiting autoimmune markers.  >> Awaiting vitamin D level.  >> Agree with Nephrology consult.  >> Patient has advanced liver disease, multiple comorbidities, and poor functional status. Would consider goals of care discussion/hospice, especially is renal function does not recover.        Mark I. Brunner, MD  10/05/23  12:29 EDT

## 2023-10-05 NOTE — CONSULTS
Referring Provider: Dr. Khalida William  Reason for Consultation: HEIDY, hyponatremia, metabolic acidosis, edema    Subjective     Chief complaint shortness of breath, generalized weakness    History of present illness:    72-year-old female presented with progressive generalized weakness, worsening shortness of breath ongoing 2 to 3 days prior to admission.  Also complained of lower extremity edema, increasing abdominal distention,.  Patient previously admitted [facility] 9/10/2023 with acute CHF, ejection fraction 70-75% with grade 1 diastolic dysfunction, patient's creatinine with diuresis 1.3.  Patient has some lower extremity cellulitis and was treated with cephalexin with some improvement.  PMH includes HTN, HLD, PVD, COPD, GERD, CHF, anemia, anxiety, depression, chronic back pain.  Labs showed UA negative for protein or blood, serum creatinine 2.03, sodium 127, CO2 17, albumin 2.7, hemoglobin 11.2, urine sodium< 20, cortisol level 24.9.  Admission creatinine was 1.45 and sodium 134.  Significant hypotension is noted.    History  Past Medical History:   Diagnosis Date    Acid reflux     Anemia     Due to chronic blood loss - Chronic blood loss anemia    Apnea     Arthritis     Backache     CHF (congestive heart failure)     COPD (chronic obstructive pulmonary disease)     Severe    Degeneration of lumbar intervertebral disc     Drug therapy     Finding    Emphysema, unspecified     Esophageal reflux     Family history of malignant neoplasm of breast in first degree relative     Gall stones     GERD (gastroesophageal reflux disease)     H/O spinal fusion     H/O: drug dependency     Hearing loss     Heart disease     Hypertrophic cardiomyopathy     Insomnia     Mixed hyperlipidemia     Neurotic Depression     Recurrent major depressive episodes, moderate     Severe obesity     Tobacco use     History of   ,   Past Surgical History:   Procedure Laterality Date    APPENDECTOMY      BACK SURGERY      BACK SURGERY       CARDIAC ABLATION      CARDIAC SURGERY      Cardiac Stent Catherization    CHOLECYSTECTOMY      Gall Bladder Surgery    HERNIA REPAIR      HIP SURGERY Left     LAPAROSCOPIC TUBAL LIGATION      NECK SURGERY      NECK SURGERY      ORTHOPEDIC SURGERY     ,   Family History   Problem Relation Age of Onset    Breast cancer Mother     Multiple sclerosis Mother     Thyroid disease Mother     Arthritis Maternal Grandmother     Heart attack Maternal Grandmother     Cancer Other    ,   Social History     Socioeconomic History    Marital status: Single   Tobacco Use    Smoking status: Former     Types: Cigarettes     Quit date: 7/11/2008     Years since quitting: 15.2    Smokeless tobacco: Never   Vaping Use    Vaping Use: Every day    Substances: Nicotine    Devices: Pre-filled or refillable cartridge   Substance and Sexual Activity    Alcohol use: No    Drug use: No    Sexual activity: Defer     E-cigarette/Vaping    E-cigarette/Vaping Use Current Every Day User      E-cigarette/Vaping Substances    Nicotine Yes      E-cigarette/Vaping Devices    Pre-filled or Refillable Cartridge Yes          ,   Medications Prior to Admission   Medication Sig Dispense Refill Last Dose    albuterol (PROVENTIL) (2.5 MG/3ML) 0.083% nebulizer solution Take 2.5 mg by nebulization Every 4 (Four) Hours As Needed for Wheezing.   Past Week    albuterol sulfate  (90 Base) MCG/ACT inhaler Inhale 2 puffs Every 4 (Four) Hours As Needed for Wheezing. 8 g 3 Past Week    aspirin 81 MG chewable tablet Chew 1 tablet Daily.   10/2/2023    atorvastatin (Lipitor) 40 MG tablet Take 1 tablet by mouth Every Night. 90 tablet 1 10/2/2023    citalopram (CeleXA) 20 MG tablet TAKE ONE TABLET BY MOUTH DAILY 90 tablet 1 10/2/2023    Daliresp 250 MCG tablet tablet TAKE ONE TABLET BY MOUTH DAILY 56 tablet 0 10/2/2023    KLOR-CON 20 MEQ CR tablet TAKE ONE TABLET BY MOUTH DAILY 90 tablet 1 10/2/2023    metoprolol tartrate (LOPRESSOR) 50 MG tablet Take 1 tablet by  mouth 2 (Two) Times a Day With Meals. 180 tablet 1 10/2/2023    pantoprazole (Protonix) 40 MG EC tablet Take 1 tablet by mouth Daily. 90 tablet 1 10/2/2023    traZODone (DESYREL) 100 MG tablet TAKE TWO TABLETS BY MOUTH ONCE NIGHTLY 180 tablet 1 10/2/2023    Trelegy Ellipta 100-62.5-25 MCG/ACT inhaler Inhale 1 puff Daily.   10/2/2023    bumetanide (BUMEX) 1 MG tablet Take 1 tablet by mouth 2 (Two) Times a Day.       fluticasone (FLONASE) 50 MCG/ACT nasal spray SPRAY TWO SPRAYS IN EACH NOSTRIL ONCE DAILY 16 mL 2     Ped Multivitamins-Fl-Iron (MULTIVITAMIN WITH FLUORIDE/IRON) 0.25-10 MG/ML solution solution Take  by mouth Daily.      , Scheduled Meds:  aspirin, 81 mg, Oral, Daily  atorvastatin, 40 mg, Oral, Nightly  budesonide-formoterol, 2 puff, Inhalation, BID - RT   And  tiotropium bromide monohydrate, 2 puff, Inhalation, Daily - RT  cefTRIAXone, 2,000 mg, Intravenous, Q24H  citalopram, 20 mg, Oral, Daily  doxycycline, 100 mg, Intravenous, Q12H  heparin (porcine), 5,000 Units, Subcutaneous, Q12H  metoprolol tartrate, 12.5 mg, Oral, BID With Meals  midodrine, 10 mg, Oral, TID AC  pantoprazole, 40 mg, Oral, Daily  rifAXIMin, 550 mg, Oral, Q12H  roflumilast, 250 mcg, Oral, Daily  saccharomyces boulardii, 250 mg, Oral, BID  senna-docusate sodium, 2 tablet, Oral, BID  sodium chloride, 10 mL, Intravenous, Q12H  traZODone, 150 mg, Oral, Nightly   , Continuous Infusions:   , PRN Meds:    acetaminophen    senna-docusate sodium **AND** polyethylene glycol **AND** bisacodyl **AND** bisacodyl    Calcium Replacement - Follow Nurse / BPA Driven Protocol    HYDROcodone-acetaminophen    hydrOXYzine    Magnesium Standard Dose Replacement - Follow Nurse / BPA Driven Protocol    melatonin    Morphine    Phosphorus Replacement - Follow Nurse / BPA Driven Protocol    Potassium Replacement - Follow Nurse / BPA Driven Protocol    sodium chloride    sodium chloride, and Allergies:  Sulfa antibiotics    Review of Systems  Pertinent items  are noted in HPI, all other systems reviewed and negative    Objective     Vital Signs  Temp:  [97.7 øF (36.5 øC)-98.2 øF (36.8 øC)] 97.9 øF (36.6 øC)  Heart Rate:  [101-114] 106  Resp:  [14-18] 18  BP: ()/(42-54) 99/48    I/O this shift:  In: 100 [IV Piggyback:100]  Out: 215 [Urine:215]  I/O last 3 completed shifts:  In: 260 [P.O.:60; IV Piggyback:200]  Out: 250 [Urine:250]    Physical Exam:  General appearance:  female sick looking laying comfortable in bed.  HEENT: Atraumatic normocephalic head, eyes pupil reactive, extraocular muscle intact, nose no bleed, oropharynx clear, neck is supple, no JVD, no lymph node enlargement, trachea midline.  Lungs: Clear to auscultation, equal chest movement, no crepitation.  Heart: Normal S1, S2, no gallop, murmur, RRR.  Abdomen: Abdomen distended soft, positive tenderness positive bowel sounds.  Extremities: Positive lower extremity edema, no cyanosis, no joint swelling.  Neuro: Alert, oriented x4, no focal deficit.  Psych: Mood and affect are normal and appropriate.  Skin: Skin is warm and dry.  Positive ecchymosis noted  : No suprapubic fullness or tenderness, no Vance catheter.    Results Review:   I reviewed the patient's new clinical results.  I reviewed the patient's new imaging results and agree with the interpretation.    WBC WBC   Date Value Ref Range Status   10/05/2023 9.13 3.40 - 10.80 10*3/mm3 Final   10/04/2023 9.18 3.40 - 10.80 10*3/mm3 Final   10/03/2023 10.93 (H) 3.40 - 10.80 10*3/mm3 Final      HGB Hemoglobin   Date Value Ref Range Status   10/05/2023 11.2 (L) 12.0 - 15.9 g/dL Final   10/04/2023 11.7 (L) 12.0 - 15.9 g/dL Final   10/03/2023 14.6 12.0 - 15.9 g/dL Final      HCT Hematocrit   Date Value Ref Range Status   10/05/2023 32.7 (L) 34.0 - 46.6 % Final   10/04/2023 34.3 34.0 - 46.6 % Final   10/03/2023 42.2 34.0 - 46.6 % Final      Platlets No results found for: LABPLAT   MCV MCV   Date Value Ref Range Status   10/05/2023 95.3 79.0 -  97.0 fL Final   10/04/2023 94.5 79.0 - 97.0 fL Final   10/03/2023 94.4 79.0 - 97.0 fL Final          Sodium Sodium   Date Value Ref Range Status   10/05/2023 127 (L) 136 - 145 mmol/L Final   10/04/2023 127 (L) 136 - 145 mmol/L Final   10/03/2023 128 (L) 136 - 145 mmol/L Final      Potassium Potassium   Date Value Ref Range Status   10/05/2023 4.6 3.5 - 5.2 mmol/L Final   10/04/2023 4.5 3.5 - 5.2 mmol/L Final   10/03/2023 5.0 3.5 - 5.2 mmol/L Final     Comment:     Slight hemolysis detected by analyzer. Results may be affected.      Chloride Chloride   Date Value Ref Range Status   10/05/2023 95 (L) 98 - 107 mmol/L Final   10/04/2023 96 (L) 98 - 107 mmol/L Final   10/03/2023 96 (L) 98 - 107 mmol/L Final      CO2 CO2   Date Value Ref Range Status   10/05/2023 17.0 (L) 22.0 - 29.0 mmol/L Final   10/04/2023 16.0 (L) 22.0 - 29.0 mmol/L Final   10/03/2023 19.0 (L) 22.0 - 29.0 mmol/L Final      BUN BUN   Date Value Ref Range Status   10/05/2023 24 (H) 8 - 23 mg/dL Final   10/04/2023 20 8 - 23 mg/dL Final   10/03/2023 21 8 - 23 mg/dL Final      Creatinine Creatinine   Date Value Ref Range Status   10/05/2023 2.03 (H) 0.57 - 1.00 mg/dL Final   10/04/2023 1.59 (H) 0.57 - 1.00 mg/dL Final   10/03/2023 1.41 (H) 0.57 - 1.00 mg/dL Final      Calcium Calcium   Date Value Ref Range Status   10/05/2023 9.1 8.6 - 10.5 mg/dL Final   10/04/2023 8.7 8.6 - 10.5 mg/dL Final   10/03/2023 9.1 8.6 - 10.5 mg/dL Final      PO4 No results found for: CAPO4   Albumin Albumin   Date Value Ref Range Status   10/05/2023 2.7 (L) 3.5 - 5.2 g/dL Final   10/04/2023 2.3 (L) 3.5 - 5.2 g/dL Final   10/03/2023 2.3 (L) 3.5 - 5.2 g/dL Final      Magnesium Magnesium   Date Value Ref Range Status   10/03/2023 1.6 1.6 - 2.4 mg/dL Final   10/03/2023 2.0 1.6 - 2.4 mg/dL Final      Uric Acid No results found for: URICACID         aspirin, 81 mg, Oral, Daily  atorvastatin, 40 mg, Oral, Nightly  budesonide-formoterol, 2 puff, Inhalation, BID - RT   And  tiotropium  bromide monohydrate, 2 puff, Inhalation, Daily - RT  cefTRIAXone, 2,000 mg, Intravenous, Q24H  citalopram, 20 mg, Oral, Daily  doxycycline, 100 mg, Intravenous, Q12H  heparin (porcine), 5,000 Units, Subcutaneous, Q12H  metoprolol tartrate, 12.5 mg, Oral, BID With Meals  midodrine, 10 mg, Oral, TID AC  pantoprazole, 40 mg, Oral, Daily  rifAXIMin, 550 mg, Oral, Q12H  roflumilast, 250 mcg, Oral, Daily  saccharomyces boulardii, 250 mg, Oral, BID  senna-docusate sodium, 2 tablet, Oral, BID  sodium chloride, 10 mL, Intravenous, Q12H  traZODone, 150 mg, Oral, Nightly           Assessment & Plan       Acute respiratory failure with hypoxia    Atherosclerosis of native arteries of extremities with intermittent claudication, other extremity    GERD (gastroesophageal reflux disease)    Hyperlipidemia    Hypertension    Chronic obstructive pulmonary disease    CHF (congestive heart failure)    Anemia    Anxiety and depression    Chronic back pain    HEIDY (acute kidney injury)    Hyponatremia    Cirrhosis of liver    Generalized weakness    Cellulitis of right lower extremity    Severe malnutrition      1.  HEIDY: Likely hepatorenal syndrome, hypotensive at time of admission.  Today creatinine 2.03, admission creatinine 1.45, 2 months ago creatinine 0.89 was close to normal.  UA did not show any proteinuria.  Urine sodium less than 20.  Likely prerenal state.  Or ATN secondary to hypotension.  S/p paracentesis, ultrasound right kidney normal size without any hydronephrosis, left kidney was not done.  2.  Hyponatremia sodium 127: Likely secondary to prerenal state secondary to hepatorenal syndrome  3.  Mild metabolic acidosis.  4.  Hypoalbuminemia: Liver disease  5.  Anemia  6.  Liver cirrhosis s/p paracentesis 3.3 L on 10/5/2023  7.  Right pleural effusion    Recommendations:  Avoid nephrotoxic medications.  IV albumin ordered.  Keep MAP greater than 65  Check volume status.  Check labs in the morning.  Daily evaluation for renal  replacement therapy will be done.  Adjust medication for the new GFR.  Case discussed with the medical staff taking care of the patient.  Pending SURINDER comprehensive panel, anti-smooth muscle antibody, hepatitis panel, alpha 1 antitrypsin, mitochondrial antibody,  High risk complex patient with multiple medical problems.    Hans Blakely MD  10/05/23  @[facility]

## 2023-10-05 NOTE — CONSULTS
Clinical Nutrition   Nutrition Assessment  Reason for Visit: MST score 2+, Physician consult, Need for education, Unintentional weight loss, Reduced oral intake    Patient Name: Laura Hui  YOB: 1950  MRN: [id number]  Date of Encounter: 10/05/23 13:08 EDT  Admission date: 10/3/2023    Pt meets criteria severe malnutrition in the context of chronic illness, see MSA for full assessment.     Pt not feeling well, RD introduced education and provided ample written education for cirrhosis, encouraged pt to review and will f/u for full verbal edu as feasible and appropriate.     Nutrition Assessment   Admission Diagnosis:  Pneumonia [J18.9]    Problem List:    Acute respiratory failure with hypoxia    Atherosclerosis of native arteries of extremities with intermittent claudication, other extremity    GERD (gastroesophageal reflux disease)    Hyperlipidemia    Hypertension    Chronic obstructive pulmonary disease    CHF (congestive heart failure)    Anemia    Anxiety and depression    Chronic back pain    HEIDY (acute kidney injury)    Hyponatremia    Cirrhosis of liver    Generalized weakness    Cellulitis of right lower extremity      PMH:   She  has a past medical history of Acid reflux, Anemia, Apnea, Arthritis, Backache, CHF (congestive heart failure), COPD (chronic obstructive pulmonary disease), Degeneration of lumbar intervertebral disc, Drug therapy, Emphysema, unspecified, Esophageal reflux, Family history of malignant neoplasm of breast in first degree relative, Gall stones, GERD (gastroesophageal reflux disease), H/O spinal fusion, H/O: drug dependency, Hearing loss, Heart disease, Hypertrophic cardiomyopathy, Insomnia, Mixed hyperlipidemia, Neurotic Depression, Recurrent major depressive episodes, moderate, Severe obesity, and Tobacco use.    PSH:  She  has a past surgical history that includes Cardiac surgery; Back surgery; Neck surgery; Cholecystectomy; laparoscopic tubal  "ligation; orthopedic surgery; Hernia repair; Back surgery; Appendectomy; Neck surgery; Cardiac Ablation; and Hip surgery (Left).    Applicable Nutrition Concerns:   Skin:  Oral:  GI:    Applicable Interval History:       Reported/Observed/Food/Nutrition Related History:     Visited with pt at bedside, tells me not feeling well and not open to education at this time but agreeable to assessment. RD introduced education and provided ample written education for cirrhosis, encouraged pt to review and will f/u for full verbal edu as feasible and appropriate.   Pt reports ongoing suboptimal energy intake of 2 small meals/day. Reports very poor over the past 2 weeks PTA and here in the hospital. States just has no appetite and feels nauseous, only wanting bland/light foods such as fruit and jello. Has not tried ONS before but open to this. Pt denied further dietary needs/preferences, NKFA.     Anthropometrics     Height: Height: 165.1 cm (65\")  Last Filed Weight: Weight: 79.6 kg (175 lb 6.4 oz) (10/05/23 0459)  Method: Weight Method: Bed scale  BMI: BMI (Calculated): 29.2  BMI classification: Overweight: 25.0-29.9kg/m2   IBW:   125 lb    UBW:     Weight       Weight (kg) Weight (lbs) Weight Method Visit Report   12/6/2022 96.752 kg  213 lb 4.8 oz   --    3/7/2023 87.998 kg  194 lb   --    7/19/2023 85.73 kg  189 lb   --    9/25/2023 78.472 kg  173 lb   --    10/3/2023 77.111 kg  170 lb       81.285 kg  179 lb 3.2 oz      10/4/2023 81.965 kg  180 lb 11.2 oz  Bed scale     10/5/2023 79.561 kg  175 lb 6.4 oz  Bed scale      78.472 kg  173 lb        Weight change: weight loss of 19 lbs (10.1%) over the past 3 month(s)    Intentional?  No    Nutrition Focused Physical Exam     Date:  10/5       Patient meets criteria for malnutrition diagnosis, see MSA note.     Current Nutrition Prescription   PO: Diet: Cardiac Diets; Healthy Heart (2-3 Na+); Texture: Regular Texture (IDDSI 7); Fluid Consistency: Thin (IDDSI 0)  Oral Nutrition " Supplement:   Intake: 1 Day: 0% X 1 meal documented    Nutrition Diagnosis   Date:  10/5            Updated:    Problem Malnutrition  severe/chronic   Etiology Energy needs>energy intake 2/2 anorexia in setting of decompensated SIMS cirrhosis   Signs/Symptoms PO intakes <75% EEN >/=1m, loss 10.1% body weight X past 3 months, and moderate muscle wasting.   Status: New    Goal:   General: Nutrition to support treatment  PO: Increase intake  EN/PN: N/A    Nutrition Intervention      Follow treatment progress, Care plan reviewed, Advise alternate selection, Advised available snacks, Interview for preferences, Encourage intake, Supplement provided, Education provided  Introduced/written provided for cirrhosis, will f/u for further as feasible and appropriate    Premier Protein BID  Fruit and jello at meals    Monitoring/Evaluation:   Per protocol, I&O, PO intake, Supplement intake, Pertinent labs, Weight, GI status, Symptoms, POC/GOC      Grisel Navarro RD, CNSC  Time Spent: 35m

## 2023-10-05 NOTE — PROGRESS NOTES
Continued Stay Note  [facility]     Patient Name: Laura Hui  MRN: [id number]  Today's Date: 10/5/2023    Admit Date: 10/3/2023    Plan: To be determined   Discharge Plan       Row Name 10/05/23 1541       Plan    Plan To be determined    Plan Comments Hospice referral received. Telephone call from Sarahi [facility]-palliative care/hospice, stating has made a visit to pt, spoke with pt and pt's daughter regarding plan of care. Sarahi stated pt is not ready for discharge as of this time, family and pt undecided on discharge plan of care. Sarahi stated for the hospice liaison to wait until tomorrow to make a visit. . Please call [phone] if can be of further assistance.                   Discharge Codes    No documentation.                 Expected Discharge Date and Time       Expected Discharge Date Expected Discharge Time    Oct 6, 2023               Karissa Burden RN

## 2023-10-05 NOTE — THERAPY EVALUATION
Patient Name: Laura Hui  : 1950    MRN: [id number]                              Today's Date: 10/5/2023       Admit Date: 10/3/2023    Visit Dx:     ICD-10-CM ICD-9-CM   1. Community acquired pneumonia, unspecified laterality  J18.9 486   2. Hypoxia  R09.02 799.02   3. Bilateral pleural effusion  J90 511.9   4. Generalized weakness  R53.1 780.79   5. Hyponatremia  E87.1 276.1   6. Renal insufficiency  N28.9 593.9     Patient Active Problem List   Diagnosis    Abnormal thyroid blood test    Obesity (BMI 30.0-34.9)    Other emphysema    Major depressive disorder, recurrent, moderate    Atherosclerosis of native arteries of extremities with intermittent claudication, other extremity    Acute respiratory failure with hypoxia    GERD (gastroesophageal reflux disease)    Hyperlipidemia    Hypertension    Chronic obstructive pulmonary disease    CHF (congestive heart failure)    Anemia    Anxiety and depression    Chronic back pain    HEIDY (acute kidney injury)    Hyponatremia    Cirrhosis of liver    Generalized weakness    Cellulitis of right lower extremity     Past Medical History:   Diagnosis Date    Acid reflux     Anemia     Due to chronic blood loss - Chronic blood loss anemia    Apnea     Arthritis     Backache     CHF (congestive heart failure)     COPD (chronic obstructive pulmonary disease)     Severe    Degeneration of lumbar intervertebral disc     Drug therapy     Finding    Emphysema, unspecified     Esophageal reflux     Family history of malignant neoplasm of breast in first degree relative     Gall stones     GERD (gastroesophageal reflux disease)     H/O spinal fusion     H/O: drug dependency     Hearing loss     Heart disease     Hypertrophic cardiomyopathy     Insomnia     Mixed hyperlipidemia     Neurotic Depression     Recurrent major depressive episodes, moderate     Severe obesity     Tobacco use     History of     Past Surgical History:   Procedure Laterality Date    APPENDECTOMY       BACK SURGERY      BACK SURGERY      CARDIAC ABLATION      CARDIAC SURGERY      Cardiac Stent Catherization    CHOLECYSTECTOMY      Gall Bladder Surgery    HERNIA REPAIR      HIP SURGERY Left     LAPAROSCOPIC TUBAL LIGATION      NECK SURGERY      NECK SURGERY      ORTHOPEDIC SURGERY        General Information       Row Name 10/05/23 1036          OT Time and Intention    Document Type evaluation  -AC     Mode of Treatment occupational therapy  -AC       Row Name 10/05/23 1036          General Information    Patient Profile Reviewed yes  -AC     Prior Level of Function independent:;feeding;grooming;min assist:;all household mobility;mod assist:;dressing;bathing;dependent:;home management;driving  dtr assists with self care  -     Existing Precautions/Restrictions fall;oxygen therapy device and L/min;other (see comments)  chronic LBP  -AC       Row Name 10/05/23 1036          Occupational Profile    Environmental Supports and Barriers (Occupational Profile) RW, W/C , tub shower with shower seat  -AC       Row Name 10/05/23 1036          Living Environment    People in Home child(melquiades), adult  dtr  -AC       Row Name 10/05/23 1036          Home Main Entrance    Number of Stairs, Main Entrance none  -AC     Stair Railings, Main Entrance none  -AC       Row Name 10/05/23 1036          Stairs Within Home, Primary    Stairs, Within Home, Primary pt lives basement level and does not need to access mainl evel  -AC       Row Name 10/05/23 1036          Cognition    Orientation Status (Cognition) oriented x 3  -AC       Row Name 10/05/23 1036          Safety Issues, Functional Mobility    Safety Issues Affecting Function (Mobility) insight into deficits/self-awareness;safety precaution awareness;safety precautions follow-through/compliance;sequencing abilities  -AC     Impairments Affecting Function (Mobility) endurance/activity tolerance;pain;strength  -AC               User Key  (r) = Recorded By, (t) = Taken By, (c) =  Cosigned By      Initials Name Provider Type    Kiana Preston, OT Occupational Therapist                     Mobility/ADL's       Row Name 10/05/23 1042          Bed Mobility    Assistive Device (Bed Mobility) bed rails  -AC     Comment, (Bed Mobility) once pt attempted to roll, she declined all further mobility d/t increased pain  -       Row Name 10/05/23 1042          Bed-Chair Transfer    Bed-Chair Bow[place] (Transfers) unable to assess  -AC       Row Name 10/05/23 1042          Sit-Stand Transfer    Sit-Stand [place] (Transfers) unable to assess  -AC       Row Name 10/05/23 1042          Activities of Daily Living    BADL Assessment/Intervention lower body dressing;grooming;feeding  -       Row Name 10/05/23 1042          Lower Body Dressing Assessment/Training    [place] Level (Lower Body Dressing) don;socks;dependent (less than 25% patient effort)  -AC     Position (Lower Body Dressing) supine  -AC       Row Name 10/05/23 1042          Grooming Assessment/Training    [place] Level (Grooming) wash face, hands;set up  -AC     Position (Grooming) sitting up in bed  -AC       Row Name 10/05/23 1042          Self-Feeding Assessment/Training    [place] Level (Feeding) liquids to mouth;independent  -AC     Position (Self-Feeding) sitting up in bed  -AC               User Key  (r) = Recorded By, (t) = Taken By, (c) = Cosigned By      Initials Name Provider Type    Kiana Preston, OT Occupational Therapist                   Obj/Interventions       Row Name 10/05/23 1044          Sensory Assessment (Somatosensory)    Sensory Subjective Reports tingling  B hands  -       Row Name 10/05/23 1044          Vision Assessment/Intervention    Visual Impairment/Limitations corrective lenses full-time  -       Row Name 10/05/23 1044          Range of Motion Comprehensive    General Range of Motion bilateral upper extremity ROM WNL  -       Row Name 10/05/23 1044          Strength  Comprehensive (MMT)    Comment, General Manual Muscle Testing (MMT) Assessment BUE grossly 3+/5 - pt unable to tolerate much resistance d/t pain  -AC               User Key  (r) = Recorded By, (t) = Taken By, (c) = Cosigned By      Initials Name Provider Type    AC Kiana Aj, OT Occupational Therapist                   Goals/Plan       Row Name 10/05/23 1050          Bed Mobility Goal 1 (OT)    Activity/Assistive Device (Bed Mobility Goal 1, OT) sit to supine;supine to sit  -AC     [place] Level/Cues Needed (Bed Mobility Goal 1, OT) verbal cues required;moderate assist (50-74% patient effort)  -AC     Time Frame (Bed Mobility Goal 1, OT) by discharge  -AC     Progress/Outcomes (Bed Mobility Goal 1, OT) new goal;goal ongoing  -AC       Row Name 10/05/23 1050          Transfer Goal 1 (OT)    Activity/Assistive Device (Transfer Goal 1, OT) bed-to-chair/chair-to-bed;toilet  -AC     [place] Level/Cues Needed (Transfer Goal 1, OT) verbal cues required;moderate assist (50-74% patient effort)  -AC     Time Frame (Transfer Goal 1, OT) by discharge  -AC     Progress/Outcome (Transfer Goal 1, OT) new goal;goal ongoing  -AC       Row Name 10/05/23 1050          Grooming Goal 1 (OT)    Activity/Device (Grooming Goal 1, OT) oral care;hair care  -AC     [place] (Grooming Goal 1, OT) set-up required;contact guard required  -AC     Time Frame (Grooming Goal 1, OT) by discharge  -AC     Strategies/Barriers (Grooming Goal 1, OT) pt will tolerate sitting EOB with CGA to complete grooming  -AC       Row Name 10/05/23 1054          Therapy Assessment/Plan (OT)    Planned Therapy Interventions (OT) activity tolerance training;BADL retraining;functional balance retraining;occupation/activity based interventions;patient/caregiver education/training;strengthening exercise;transfer/mobility retraining  -AC               User Key  (r) = Recorded By, (t) = Taken By, (c) = Cosigned By      Initials Name Provider Type    AC  Kiana Aj, OT Occupational Therapist                   Clinical Impression       Row Name 10/05/23 1045          Pain Assessment    Pain Intervention(s) Repositioned;Ambulation/increased activity  -     Additional Documentation Pain Scale: FACES Pre/Post-Treatment (Group)  -       Row Name 10/05/23 1045          Pain Scale: FACES Pre/Post-Treatment    Pain: FACES Scale, Pretreatment 2-->hurts little bit  -AC     Posttreatment Pain Rating 4-->hurts little more  -AC     Pain Location - abdomen;back  -       Row Name 10/05/23 1045          Plan of Care Review    Plan of Care Reviewed With patient  -     Outcome Evaluation Pt presents below baseline d/t pain, weakness and decreased activity toelrance.  Pt setup to wash face, dep LBD.  Pt declined all mobility d/t increased pain with all movement. OT will follow to advance pt toward increased independence with self care/mobility.  Recommend SNF upon d/c.  -       Row Name 10/05/23 1049          Therapy Assessment/Plan (OT)    Rehab Potential (OT) fair, will monitor progress closely  -     Criteria for Skilled Therapeutic Interventions Met (OT) yes;skilled treatment is necessary  -     Therapy Frequency (OT) daily  -       Row Name 10/05/23 1045          Therapy Plan Review/Discharge Plan (OT)    Anticipated Discharge Disposition (OT) skilled nursing facility  -       Row Name 10/05/23 1045          Vital Signs    Pre Systolic BP Rehab 99  -AC     Pre Treatment Diastolic BP 48  -AC     Post Systolic BP Rehab 100  -AC     Post Treatment Diastolic BP 52  -AC     Posttreatment Heart Rate (beats/min) 106  -AC     Intratreatment Resp Rate (breaths/min) 106  -AC     Pre SpO2 (%) 95  -AC     O2 Delivery Pre Treatment supplemental O2  -AC     O2 Delivery Intra Treatment supplemental O2  -AC     Post SpO2 (%) 94  -AC     O2 Delivery Post Treatment supplemental O2  -AC     Pre Patient Position Supine  -AC     Post Patient Position Supine  -       Row Name  10/05/23 1045          Positioning and Restraints    Pre-Treatment Position in bed  -AC     Post Treatment Position bed  -AC     In Bed notified nsg;supine;call light within reach;encouraged to call for assist;exit alarm on  -AC               User Key  (r) = Recorded By, (t) = Taken By, (c) = Cosigned By      Initials Name Provider Type    Kiana Preston, OT Occupational Therapist                   Outcome Measures       Row Name 10/05/23 1100          How much help from another is currently needed...    Putting on and taking off regular lower body clothing? 1  -AC     Bathing (including washing, rinsing, and drying) 2  -AC     Toileting (which includes using toilet bed pan or urinal) 1  -AC     Putting on and taking off regular upper body clothing 2  -AC     Taking care of personal grooming (such as brushing teeth) 3  -AC     Eating meals 3  -AC     AM-PAC 6 Clicks Score (OT) 12  -AC       Row Name 10/05/23 0815          How much help from another person do you currently need...    Turning from your back to your side while in flat bed without using bedrails? 3  -MC     Moving from lying on back to sitting on the side of a flat bed without bedrails? 2  -MC     Moving to and from a bed to a chair (including a wheelchair)? 2  -MC     Standing up from a chair using your arms (e.g., wheelchair, bedside chair)? 2  -MC     Climbing 3-5 steps with a railing? 1  -MC     To walk in hospital room? 2  -MC     AM-PAC 6 Clicks Score (PT) 12  -MC     Highest level of mobility 4 --> Transferred to chair/commode  -       Row Name 10/05/23 1100          Functional Assessment    Outcome Measure Options AM-PAC 6 Clicks Daily Activity (OT)  -AC               User Key  (r) = Recorded By, (t) = Taken By, (c) = Cosigned By      Initials Name Provider Type    Kiana Preston, OT Occupational Therapist    Mary Saini RN Registered Nurse                    Occupational Therapy Education       Title: PT OT SLP Therapies (In  Progress)       Topic: Occupational Therapy (In Progress)       Point: ADL training (Done)       Description:   Instruct learner(s) on proper safety adaptation and remediation techniques during self care or transfers.   Instruct in proper use of assistive devices.                  Learning Progress Summary             Patient Acceptance, E, VU by  at 10/5/2023 1101                         Point: Home exercise program (Not Started)       Description:   Instruct learner(s) on appropriate technique for monitoring, assisting and/or progressing therapeutic exercises/activities.                  Learner Progress:  Not documented in this visit.              Point: Precautions (Not Started)       Description:   Instruct learner(s) on prescribed precautions during self-care and functional transfers.                  Learner Progress:  Not documented in this visit.              Point: Body mechanics (Not Started)       Description:   Instruct learner(s) on proper positioning and spine alignment during self-care, functional mobility activities and/or exercises.                  Learner Progress:  Not documented in this visit.                              User Key       Initials Effective Dates Name Provider Type Discipline     02/03/23 -  Kiana Aj, OT Occupational Therapist OT                  OT Recommendation and Plan  Planned Therapy Interventions (OT): activity tolerance training, BADL retraining, functional balance retraining, occupation/activity based interventions, patient/caregiver education/training, strengthening exercise, transfer/mobility retraining  Therapy Frequency (OT): daily  Plan of Care Review  Plan of Care Reviewed With: patient  Outcome Evaluation: Pt presents below baseline d/t pain, weakness and decreased activity toelrance.  Pt setup to wash face, dep LBD.  Pt declined all mobility d/t increased pain with all movement. OT will follow to advance pt toward increased independence with self  care/mobility.  Recommend SNF upon d/c.     Time Calculation:   Evaluation Complexity (OT)  Review Occupational Profile/Medical/Therapy History Complexity: expanded/moderate complexity  Assessment, Occupational Performance/Identification of Deficit Complexity: 3-5 performance deficits  Clinical Decision Making Complexity (OT): detailed assessment/moderate complexity  Overall Complexity of Evaluation (OT): moderate complexity     Time Calculation- OT       Row Name 10/05/23 1015             Time Calculation- OT    OT Start Time 1015  -AC      OT Received On 10/05/23  -AC      OT Goal Re-Cert Due Date 10/15/23  -AC         Untimed Charges    OT Eval/Re-eval Minutes 40  -AC         Total Minutes    Untimed Charges Total Minutes 40  -AC       Total Minutes 40  -AC                User Key  (r) = Recorded By, (t) = Taken By, (c) = Cosigned By      Initials Name Provider Type    AC Kiana Aj OT Occupational Therapist                  Therapy Charges for Today       Code Description Service Date Service Provider Modifiers Qty    [id number] HC OT EVAL MOD COMPLEXITY 3 10/5/2023 Kiana Aj OT GO 1                 Kiana Aj OT  10/5/2023

## 2023-10-05 NOTE — PLAN OF CARE
Goal Outcome Evaluation:  Plan of Care Reviewed With: patient, daughter        Problem: Adult Inpatient Plan of Care  Goal: Plan of Care Review  Outcome: Ongoing, Progressing  Flowsheets (Taken 10/5/2023 8137)  Progress: no change  Plan of Care Reviewed With:   patient   daughter     Progress: no change     Patient continues to be in ST on monitor with low BP. Midodrine dose increased today.States no appetite and has refused trays. Anxiety and pain. Monitoring relief with prn meds ordered. Hospice/palliative consults today.          Problem: Adult Inpatient Plan of Care  Goal: Absence of Hospital-Acquired Illness or Injury  Outcome: Ongoing, Progressing     Problem: Adult Inpatient Plan of Care  Goal: Optimal Comfort and Wellbeing  Intervention: Monitor Pain and Promote Comfort  Recent Flowsheet Documentation  Taken 10/5/2023 1425 by Mary De Leon RN  Pain Management Interventions:   see MAR   quiet environment facilitated   pillow support provided   pain management plan reviewed with patient/caregiver  Taken 10/5/2023 0880 by Mary De Leon, RN  Pain Management Interventions:   see MAR   quiet environment facilitated     Problem: Adult Inpatient Plan of Care  Goal: Readiness for Transition of Care  Outcome: Ongoing, Progressing     Problem: Fall Injury Risk  Goal: Absence of Fall and Fall-Related Injury  Outcome: Ongoing, Progressing     Problem: Skin Injury Risk Increased  Goal: Skin Health and Integrity  Outcome: Ongoing, Progressing

## 2023-10-05 NOTE — CONSULTS
Palliative Care Initial Consult   Attending Physician: Anna William DO  Referring Provider: Anna William DO     Reason for Referral:  assistance with clarification of goals of care    Code Status:   Code Status and Medical Interventions:   Ordered at: 10/05/23 1537     Medical Intervention Limits:    NO intubation (DNI)    NO cardioversion    NO dialysis    NO artificial nutrition     Level Of Support Discussed With:    Patient    Next of Kin (If No Surrogate)     Code Status (Patient has no pulse and is not breathing):    No CPR (Do Not Attempt to Resuscitate)     Medical Interventions (Patient has pulse or is breathing):    Limited Support      Advanced Directives: Advance Directive Status: Patient does not have advance directive   Family/Support: Lives at home with Daughter Mary who is PCG.    Goals of Care: TBD.    HPI:     73 yoF presented to ED on 10/3/23 with complaints of generalized weakness and progressively worsening SOB going on for the past 2-3 days. Dyspnea worse with exertion. She has some bilateral lower extremity edema that has improved from a month ago, but notes increasing abdominal distension.      PMH significant for HTN, HLD, HFpEF, PVD, COPD, chronic back pain, GERD, anemia, and anxiety/depression.     Records reviewed indicate that patient was admitted to [facility] on 9/10/2023 and discharged on 9/11/2023 for acute decompensated congestive heart failure. She was evaluated by cardiology in the hospital and had echo performed which showed EF of 70 to 75% with normal wall motion and grade 1 diastolic dysfunction. Patient had significant diuretic response with bump of creatinine to 1.3. She was also found to have a type II non-STEMI, and troponins were trended to improvement. Hospital course was complicated by right lower extremity cellulitis for which she has completed a course of Cephalexin.       Per daughters report, pt lives in her home.  Prior to admission, pt  "was ambulatory, able to perform her ADL's with minimal assistance.     Patient is alert and oriented today.  She verbalizes that today Dr has informed her that she is terminal, prognosis < 6 months.  She voices concern about returning to her daughter's home due to having twin 11 year old grandchildren in the home.  She states that she does not wish for them to \"watch this process\".  She states that she may desire to go to SNF at discharge.      We discussed code status.  Pt verbalizes that she wishes to be DNR, no CPR, no intubation. Pt and daughter wish to monitor renal function for a few more days.  Pt eligible for home hospice at this time, Palliative care team will continue to follow patient for ongoing symptoms and goals of care clarification.     ROS:   Constitutional:  Negative for chills, fatigue and fever.   Respiratory:  Positive for shortness of breath and cough.    Cardiovascular:  Positive for leg swelling. Negative for chest pain.   Gastrointestinal:  Positive for abdominal distention. Negative for abdominal pain, diarrhea, nausea and vomiting.   Genitourinary:  Negative for dysuria and flank pain. Positive for anuria.   Neurological:  Positive for weakness.   Psychiatric/Behavioral:  Negative for agitation and confusion.    Past Medical History:   Diagnosis Date    Acid reflux     Anemia     Due to chronic blood loss - Chronic blood loss anemia    Apnea     Arthritis     Backache     CHF (congestive heart failure)     COPD (chronic obstructive pulmonary disease)     Severe    Degeneration of lumbar intervertebral disc     Drug therapy     Finding    Emphysema, unspecified     Esophageal reflux     Family history of malignant neoplasm of breast in first degree relative     Gall stones     GERD (gastroesophageal reflux disease)     H/O spinal fusion     H/O: drug dependency     Hearing loss     Heart disease     Hypertrophic cardiomyopathy     Insomnia     Mixed hyperlipidemia     Neurotic Depression  " "   Recurrent major depressive episodes, moderate     Severe obesity     Tobacco use     History of     Past Surgical History:   Procedure Laterality Date    APPENDECTOMY      BACK SURGERY      BACK SURGERY      CARDIAC ABLATION      CARDIAC SURGERY      Cardiac Stent Catherization    CHOLECYSTECTOMY      Gall Bladder Surgery    HERNIA REPAIR      HIP SURGERY Left     LAPAROSCOPIC TUBAL LIGATION      NECK SURGERY      NECK SURGERY      ORTHOPEDIC SURGERY       Social History     Socioeconomic History    Marital status: Single   Tobacco Use    Smoking status: Former     Types: Cigarettes     Quit date: 7/11/2008     Years since quitting: 15.2    Smokeless tobacco: Never   Vaping Use    Vaping Use: Every day    Substances: Nicotine    Devices: Pre-filled or refillable cartridge   Substance and Sexual Activity    Alcohol use: No    Drug use: No    Sexual activity: Defer     Family History   Problem Relation Age of Onset    Breast cancer Mother     Multiple sclerosis Mother     Thyroid disease Mother     Arthritis Maternal Grandmother     Heart attack Maternal Grandmother     Cancer Other        Allergies   Allergen Reactions    Sulfa Antibiotics Rash       Current medication reviewed for route, type, dose and frequency and are current per MAR at time of dictation.    Palliative Performance Scale Score:      BP 99/48   Pulse 106   Temp 97.9 øF (36.6 øC) (Oral)   Resp 18   Ht 165.1 cm (65\")   Wt 78.5 kg (173 lb)   SpO2 95%   BMI 28.79 kg/mý     Intake/Output Summary (Last 24 hours) at 10/5/2023 1541  Last data filed at 10/5/2023 1534  Gross per 24 hour   Intake 360 ml   Output 215 ml   Net 145 ml       Physical Exam:    General Appearance:    Alert, cooperative, NAD. Dyspnea with conversation.    HEENT:    NC/AT, EOMI, anicteric, MMM, face relaxed   Neck:   supple, trachea midline, no JVD   Lungs:     Rales, wheezes noted.     Heart:    RRR, normal S1 and S2   Abdomen:     Normal bowel sounds, soft, nontender, " nondistended   G/U:   Deferred   MSK/Extremities:   Wasting, +edema noted to BLE    Pulses:   Pulses palpable and equal bilaterally   Skin:   Warm, dry   Neurologic:   A/Ox3, cooperative   Psych:   Calm, appropriate         Labs:   Results from last 7 days   Lab Units 10/05/23  0339   WBC 10*3/mm3 9.13   HEMOGLOBIN g/dL 11.2*   HEMATOCRIT % 32.7*   PLATELETS 10*3/mm3 127*     Results from last 7 days   Lab Units 10/05/23  0339   SODIUM mmol/L 127*   POTASSIUM mmol/L 4.6   CHLORIDE mmol/L 95*   CO2 mmol/L 17.0*   BUN mg/dL 24*   CREATININE mg/dL 2.03*   GLUCOSE mg/dL 77   CALCIUM mg/dL 9.1     Results from last 7 days   Lab Units 10/05/23  0339   SODIUM mmol/L 127*   POTASSIUM mmol/L 4.6   CHLORIDE mmol/L 95*   CO2 mmol/L 17.0*   BUN mg/dL 24*   CREATININE mg/dL 2.03*   CALCIUM mg/dL 9.1   BILIRUBIN mg/dL 0.8   ALK PHOS U/L 180*   ALT (SGPT) U/L 18   AST (SGOT) U/L 51*   GLUCOSE mg/dL 77     Imaging Results (Last 72 Hours)       Procedure Component Value Units Date/Time    US Paracentesis [[id number]] Collected: 10/05/23 0830    Specimen: Body Fluid Updated: 10/05/23 0834    Narrative:      US PARACENTESIS     History: new ascites     : Demetrius Izquierdo MD.     Modality: Ultrasonography                       Sedation: None.    Anesthesia:  Lidocaine 1% local infiltration.    Estimated blood loss:  0 cc.            Technique:  A thorough discussion of the risks, benefits, and alternatives of the procedure, and if applicable, moderate sedation, was carried out with the patient. They were encouraged to ask any questions. Any questions were answered. They verbalized   understanding. A written informed consent was then signed.      A multi-component timeout was performed prior to starting the procedure using the departmental protocol.     The procedure room personnel used personal protective equipment. The operators used sterile gloves and if indicated, sterile gowns. The surgical site was prepped with  chlorhexidine gluconate  and draped in the maximal applicable sterile fashion.    A preliminary ultrasonography was performed to assess the target and determine a safe access site. It showed ascites. Pertinent ultrasound images were stored to the PACS for documentation.    The access site was sterilely prepped and draped. Local anesthesia was administered. A dermatotomy was performed if needed. A catheter over the needle system was advanced into the peritoneal cavity. Straw colored fluid was aspirated and the plastic   catheter advanced into the peritoneum. The catheter was then connected to a fluid recovery system. At the end of the procedure, the catheter was withdrawn and an aseptic dressing applied. The patient tolerated the procedure well.    After uneventful recovery recovery, the patient was discharged from the department in stable condition.    The total amount of fluid recovered is given below.    Albumin was infused intravenously if ordered by the referring doctor.    Complications: None immediate.    Specimen:  The specimen was labeled and sent to the lab in appropriate carriers & containers if such was requested by the ordering provider.      Impression:      Impression:                                                                Successful ultrasound-guided paracentesis using a Right lower quadrant access with recovery of 3.3 liters of fluid as described above.    Thank you for the opportunity to assist in the care of your patient.      Electronically Signed: Demetrius Izquierdo MD    10/5/2023 8:31 AM EDT    Workstation ID: [id number]    XR Chest 1 View [[id number]] Collected: 10/05/23 0759     Updated: 10/05/23 0803    Narrative:      XR CHEST 1 VW    Date of Exam: 10/5/2023 3:26 AM CDT    Indication: Pleural effusion    Comparison: 10/3/2023    Findings:  Cardiomediastinal silhouette is unchanged. Persistent pulmonary vascular congestion and interstitial edema. There is central pulmonary edema with small  to moderate left and small right pleural effusions. No new airspace disease nor pneumothorax. No acute   osseous abnormality identified.      Impression:      Impression:  Moderate CHF/volume overload features are similar to prior exam.      Electronically Signed: Isaac Smalls MD    10/5/2023 7:00 AM CDT    Workstation ID: [id number]    US Liver [[id number]] Collected: 10/04/23 0832     Updated: 10/04/23 0837    Narrative:      US LIVER    Date of Exam: 10/4/2023 7:00 AM CDT    Indication: new cirrhosis.    Comparison: No comparisons available.    Technique: Grayscale and color Doppler ultrasound evaluation of the right upper quadrant was performed.      Findings:  LIVER: Nodular contours and heterogeneous parenchymal echotexture consistent with cirrhosis. No focal lesions identified. Normal flow is present within the hepatic vasculature.     GALLBLADDER: Gallbladder is surgically absent. The common bile duct measures 2 mm in diameter, normal.    PANCREAS:  Visualized portions are unremarkable.    AORTA/IVC:  Visualized portions are unremarkable.    RIGHT KIDNEY:  Normal in size with no focal lesions. No evidence of nephrolithiasis or hydronephrosis.    There is moderate volume ascites. There is partial visualization of a right pleural effusion.          Impression:      Impression:  1.Cirrhosis with ascites and right pleural effusion.        Electronically Signed: Isaac Smalls MD    10/4/2023 7:34 AM CDT    Workstation ID: [id number]    CT Angiogram Chest [[id number]] Collected: 10/03/23 1924     Updated: 10/03/23 1932    Narrative:      CT ANGIOGRAM CHEST    Date of Exam: 10/3/2023 7:07 PM EDT    Indication: SOB, hypoxic, elevated dimer.    Comparison: None available.    Technique: CTA of the chest was performed after the uneventful intravenous administration of 85 mL Isovue 370. Reconstructed coronal and sagittal images were also obtained. In addition, a 3-D volume rendered image was created for interpretation.    Automated exposure control and iterative reconstruction methods were used.      Findings:  There is generally good contrast opacification of the pulmonary arteries and no evidence of embolic disease. No evidence of thoracic aortic aneurysm or dissection is seen. There is probably high-grade origin stenosis of the left subclavian artery, with   very dense calcification of the lumen on image 26 series 4. No pericardial effusion or mediastinal adenopathy is seen. There is diffuse coronary artery calcium. Multiple macrocalcifications are seen in the normal sized thyroid    There is a large free-flowing left pleural effusion and moderate free-flowing right pleural effusion. There is nearly complete atelectasis of the left lower lobe and milder right lower lobe atelectasis. Mild micro bullous change is seen of the upper   lobes. No other evidence of active pulmonary parenchymal disease is appreciated. There is a calcified right middle lobe granuloma. The airways appear to be normally patent.    Included images of the upper abdomen show extensive ascites. Liver morphology suggests cirrhosis, with marked enlargement of the left liver lobe and small right liver lobe. There appear to be clips in the gallbladder fossa. Spleen is normal in size.    Bony structures appear to be intact.      Impression:      Impression:    1. No evidence of pulmonary embolic disease.    2. Large free-flowing left pleural effusion and moderate right effusion.    3. Extensive atelectasis of the left lower lobe due to effusion and mild right lower lobe atelectasis.    4. Liver morphology consistent with cirrhosis. Upper abdominal ascites, on these limited images, appears to be extensive.    5. Incidentally noted heavy calcification of the left subclavian artery origin, suggesting high-grade stenosis, or possibly occlusion.        Electronically Signed: Brian Sutherland MD    10/3/2023 7:29 PM EDT    Workstation ID: [id number]    CT Head Without Contrast  [[id number]] Collected: 10/03/23 1912     Updated: 10/03/23 1916    Narrative:      CT HEAD WO CONTRAST    Date of Exam: 10/3/2023 7:07 PM EDT    Indication: gen weakness.    Comparison: None available.    Technique: Axial CT images were obtained of the head without contrast administration.  Automated exposure control and iterative construction methods were used.      FINDINGS:  There is no evidence for acute intracranial hemorrhage. No definitive acute focal ischemia is identified. Nonspecific white matter changes are noted likely related to chronic small vessel ischemic changes and age-related changes. Associated diffuse   volume loss is observed. There is no evidence for abnormal cerebral edema. There is no mass effect or midline shift. The ventricular system is nondilated. The basal cisterns are patent. The skull is intact. The paranasal sinuses and mastoid air cells are   clear.      Impression:      1.No evidence for acute intracranial abnormality.  2.Nonspecific white matter changes are noted with associated diffuse volume loss. These findings are likely related to chronic small vessel ischemic changes and/or age-related changes.      Electronically Signed: Peter Don MD    10/3/2023 7:13 PM EDT    Workstation ID: [id number]    XR Chest 1 View [[id number]] Collected: 10/03/23 1727     Updated: 10/03/23 1731    Narrative:      XR CHEST 1 VW    Date of Exam: 10/3/2023 4:12 PM CDT    Indication: gen weakness    Comparison: 7/19/2023    Findings:  Cardiomediastinal silhouette is unremarkable. There is left mid to lower lung airspace disease suggestive of pneumonia. Small to moderate left and small right effusions. No acute osseous abnormality identified.      Impression:      Impression:  1.Left mid to lower lung airspace disease with small to moderate effusion suggestive of pneumonia. Correlate with symptoms.  2.Persistent small right effusion.      Electronically Signed: Isaac Smalls MD    10/3/2023 4:28 PM  CDT    Workstation ID: [id number]                  Diagnostics: Reviewed    A:   Acute respiratory failure with hypoxia    Atherosclerosis of native arteries of extremities with intermittent claudication, other extremity    GERD (gastroesophageal reflux disease)    Hyperlipidemia    Hypertension    Chronic obstructive pulmonary disease    CHF (congestive heart failure)    Anemia    Anxiety and depression    Chronic back pain    HEIDY (acute kidney injury)    Hyponatremia    Cirrhosis of liver    Generalized weakness    Cellulitis of right lower extremity    Severe malnutrition       S/S:   GOC clarification- met with patient in the room, spoke to her daughter Mary over the phone.  Code status changed to DNR, limited support today per patient request.   Palliative care will continue to follow patient for goals of care and symptom management.          P:    Thank you for this consult and allowing us to participate in patient's plan of care. Palliative Care Team will continue to follow patient. Please do not hesitate to contact us regarding further symptom management or goals of care needs.    Time: 40 minutes spent reviewing medical and medication records, assessing and examining patient, discussing with family, answering questions, providing some guidance about a plan and documentation of care, and coordinating care with other healthcare members, with > 50% time spent face to face.     Copied text in this note has been reviewed and is accurate as of 10/05/23.     Sarahi Romo, APRN  10/5/2023

## 2023-10-05 NOTE — PLAN OF CARE
Problem: Adult Inpatient Plan of Care  Goal: Plan of Care Review  Outcome: Ongoing, Progressing  Flowsheets (Taken 10/5/2023 0419)  Progress: no change  Plan of Care Reviewed With: patient  Outcome Evaluation:   VSS throughout shift. Pt BP slightly hypotensive during shift. No increase in oxygen demand. Patient pain medicated per PRN MAR   effective per pt. No acute events.  Goal: Patient-Specific Goal (Individualized)  Outcome: Ongoing, Progressing  Goal: Absence of Hospital-Acquired Illness or Injury  Outcome: Ongoing, Progressing  Intervention: Identify and Manage Fall Risk  Recent Flowsheet Documentation  Taken 10/5/2023 0024 by Oksana Burgos RN  Safety Promotion/Fall Prevention:   activity supervised   assistive device/personal items within reach   clutter free environment maintained   nonskid shoes/slippers when out of bed   room organization consistent   safety round/check completed  Taken 10/4/2023 2200 by Oksana Burgos RN  Safety Promotion/Fall Prevention:   activity supervised   clutter free environment maintained   assistive device/personal items within reach   nonskid shoes/slippers when out of bed   room organization consistent   safety round/check completed  Taken 10/4/2023 2024 by Oksana Burgos RN  Safety Promotion/Fall Prevention:   activity supervised   assistive device/personal items within reach   clutter free environment maintained   nonskid shoes/slippers when out of bed   room organization consistent   safety round/check completed  Intervention: Prevent Skin Injury  Recent Flowsheet Documentation  Taken 10/5/2023 0024 by Oksana Burgos RN  Body Position:   weight shifting   right  Skin Protection:   adhesive use limited   incontinence pads utilized   transparent dressing maintained   tubing/devices free from skin contact  Taken 10/4/2023 2200 by Oksana Burgos RN  Body Position:   weight shifting   turned   supine  Skin Protection:   adhesive use limited    incontinence pads utilized   transparent dressing maintained   tubing/devices free from skin contact  Taken 10/4/2023 2024 by Oksana Burgos RN  Body Position:   weight shifting   turned   left  Skin Protection:   adhesive use limited   incontinence pads utilized   transparent dressing maintained   tubing/devices free from skin contact  Intervention: Prevent and Manage VTE (Venous Thromboembolism) Risk  Recent Flowsheet Documentation  Taken 10/5/2023 0024 by Oksana Burgos RN  Activity Management: activity encouraged  Taken 10/4/2023 2200 by Oksana Burgos RN  Activity Management: activity encouraged  Taken 10/4/2023 2024 by Oksana Burgos RN  Activity Management: activity encouraged  VTE Prevention/Management: (heparin sq)   bilateral   sequential compression devices off  Range of Motion: active ROM (range of motion) encouraged  Intervention: Prevent Infection  Recent Flowsheet Documentation  Taken 10/5/2023 0024 by Oksana Burgos RN  Infection Prevention:   environmental surveillance performed   hand hygiene promoted   rest/sleep promoted  Taken 10/4/2023 2200 by Oksana Burgos RN  Infection Prevention:   environmental surveillance performed   hand hygiene promoted   rest/sleep promoted  Taken 10/4/2023 2024 by Oksana Burgos RN  Infection Prevention:   environmental surveillance performed   hand hygiene promoted   rest/sleep promoted  Goal: Optimal Comfort and Wellbeing  Outcome: Ongoing, Progressing  Intervention: Monitor Pain and Promote Comfort  Recent Flowsheet Documentation  Taken 10/5/2023 0024 by Oksana Burgos RN  Pain Management Interventions:   see MAR   pillow support provided  Intervention: Provide Person-Centered Care  Recent Flowsheet Documentation  Taken 10/4/2023 2024 by Oksana Burgos RN  Trust Relationship/Rapport:   care explained   choices provided   thoughts/feelings acknowledged  Goal: Readiness for Transition of Care  Outcome: Ongoing,  Progressing     Problem: Fall Injury Risk  Goal: Absence of Fall and Fall-Related Injury  Outcome: Ongoing, Progressing  Intervention: Identify and Manage Contributors  Recent Flowsheet Documentation  Taken 10/4/2023 2024 by Oksana Burgos RN  Medication Review/Management: medications reviewed  Self-Care Promotion: independence encouraged  Intervention: Promote Injury-Free Environment  Recent Flowsheet Documentation  Taken 10/5/2023 0024 by Oksana Burgos RN  Safety Promotion/Fall Prevention:   activity supervised   assistive device/personal items within reach   clutter free environment maintained   nonskid shoes/slippers when out of bed   room organization consistent   safety round/check completed  Taken 10/4/2023 2200 by Oksana Burgos RN  Safety Promotion/Fall Prevention:   activity supervised   clutter free environment maintained   assistive device/personal items within reach   nonskid shoes/slippers when out of bed   room organization consistent   safety round/check completed  Taken 10/4/2023 2024 by Oksana Burgos RN  Safety Promotion/Fall Prevention:   activity supervised   assistive device/personal items within reach   clutter free environment maintained   nonskid shoes/slippers when out of bed   room organization consistent   safety round/check completed     Problem: Skin Injury Risk Increased  Goal: Skin Health and Integrity  Outcome: Ongoing, Progressing  Intervention: Optimize Skin Protection  Recent Flowsheet Documentation  Taken 10/5/2023 0024 by Oksana Burgos RN  Pressure Reduction Techniques:   frequent weight shift encouraged   heels elevated off bed   positioned off wounds   weight shift assistance provided  Head of Bed (HOB) Positioning: HOB elevated  Pressure Reduction Devices:   positioning supports utilized   heel offloading device utilized   pressure-redistributing mattress utilized  Skin Protection:   adhesive use limited   incontinence pads utilized    transparent dressing maintained   tubing/devices free from skin contact  Taken 10/4/2023 2200 by Oksana Burgos RN  Pressure Reduction Techniques:   frequent weight shift encouraged   heels elevated off bed   positioned off wounds   weight shift assistance provided  Head of Bed (HOB) Positioning: hospitals elevated  Pressure Reduction Devices:   heel offloading device utilized   positioning supports utilized   pressure-redistributing mattress utilized  Skin Protection:   adhesive use limited   incontinence pads utilized   transparent dressing maintained   tubing/devices free from skin contact  Taken 10/4/2023 2024 by Oksana Burgos RN  Pressure Reduction Techniques:   frequent weight shift encouraged   heels elevated off bed   positioned off wounds   weight shift assistance provided  Head of Bed (hospitals) Positioning: hospitals elevated  Pressure Reduction Devices:   pressure-redistributing mattress utilized   positioning supports utilized  Skin Protection:   adhesive use limited   incontinence pads utilized   transparent dressing maintained   tubing/devices free from skin contact   Goal Outcome Evaluation:  Plan of Care Reviewed With: patient        Progress: no change  Outcome Evaluation: VSS throughout shift. Pt BP slightly hypotensive during shift. No increase in oxygen demand. Patient pain medicated per PRN MAR; effective per pt. No acute events.

## 2023-10-05 NOTE — PROGRESS NOTES
SW and RN met with pt family to discuss inpatient hospice criteria.  Pt does not meet inpatient hospice criteria at this time.  In talking with the family, they may want to take pt home with hospice at time of dc.  Outpatient hospice contacted to set up a time to meet with family in the morning. Inpatient hospice will continue to follow.

## 2023-10-05 NOTE — PROGRESS NOTES
[facility] Medicine Services  PROGRESS NOTE    Patient Name: Laura Hui  : 1950  MRN: [id number]    Date of Admission: 10/3/2023  Primary Care Physician: Carmella Barboza DO    Subjective   Subjective     CC:  Ascites, abd pain    HPI:  No acute events. Breahting is improved. Continues with abd pain. Some better following para but still an issue. Reviewed renal function. Called and updated daughter.       Objective   Objective     Vital Signs:   Temp:  [97.7 øF (36.5 øC)-98.2 øF (36.8 øC)] 97.9 øF (36.6 øC)  Heart Rate:  [101-114] 106  Resp:  [14-18] 18  BP: ()/(42-54) 99/48  Flow (L/min):  [2] 2     Physical Exam:  Constitutional: acute/chronically ill   HENT: NCAT, mucous membranes moist  Respiratory: diminished; coarse   Cardiovascular: RRR, no murmurs, rubs, or gallops  Gastrointestinal: Positive bowel sounds, soft, distention improved. Diffuse tenderness  Musculoskeletal: trace bilateral ankle edema  Psychiatric: flat affect, cooperative  Neurologic: Oriented x 3, strength symmetric in all extremities, Cranial Nerves grossly intact to confrontation, speech clear  Skin: No rashes      Results Reviewed:  LAB RESULTS:      Lab 10/05/23  0339 10/04/23  0453 10/03/23  1804 10/03/23  1720   WBC 9.13 9.18  --  10.93*   HEMOGLOBIN 11.2* 11.7*  --  14.6   HEMATOCRIT 32.7* 34.3  --  42.2   PLATELETS 127* 143  --  213   NEUTROS ABS  --  7.52*  --  9.28*   IMMATURE GRANS (ABS)  --  0.04  --  0.05   LYMPHS ABS  --  0.69*  --  0.77   MONOS ABS  --  0.88  --  0.80   EOS ABS  --  0.03  --  0.02   MCV 95.3 94.5  --  94.4   PROCALCITONIN  --   --   --  0.97*   LACTATE  --   --  1.9  --    PROTIME  --   --   --  15.2*   D DIMER QUANT  --   --   --  2.84*         Lab 10/05/23  0339 10/04/23  0453 10/03/23  2222 10/03/23  4966   SODIUM 127* 127*  --  128*   POTASSIUM 4.6 4.5  --  5.0   CHLORIDE 95* 96*  --  96*   CO2 17.0* 16.0*  --  19.0*   ANION GAP 15.0 15.0  --  13.0   BUN 24* 20   --  21   CREATININE 2.03* 1.59*  --  1.41*   EGFR 25.5* 34.2*  --  39.5*   GLUCOSE 77 69  --  91   CALCIUM 9.1 8.7  --  9.1   MAGNESIUM  --   --  1.6 2.0   TSH  --   --   --  4.920*         Lab 10/05/23  0339 10/04/23  0453 10/03/23  1720   TOTAL PROTEIN 4.6* 4.7* 5.5*   ALBUMIN 2.7* 2.3* 2.3*   GLOBULIN 1.9 2.4 3.2   ALT (SGPT) 18 19 17   AST (SGOT) 51* 55* 71*   BILIRUBIN 0.8 0.9 1.1   ALK PHOS 180* 194* 262*         Lab 10/03/23  2222 10/03/23  1950/23  1720   PROBNP  --   --  10,567.0*   HSTROP T 77* 74* 80*   PROTIME  --   --  15.2*   INR  --   --  1.19*             Lab 10/04/23  0453   IRON 50   IRON SATURATION (TSAT) 35   TIBC 143*   TRANSFERRIN 96*         Lab 10/03/23  2328   PH, ARTERIAL 7.454*   PCO2, ARTERIAL 25.7*   PO2 ART 82.6*   FIO2 32   HCO3 ART 18.0*   BASE EXCESS ART -4.4*   CARBOXYHEMOGLOBIN 0.9     Brief Urine Lab Results  (Last result in the past 365 days)        Color   Clarity   Blood   Leuk Est   Nitrite   Protein   CREAT   Urine HCG        10/05/23 1322 Yellow   Clear   Negative   Negative   Negative   Negative                   Microbiology Results Abnormal       Procedure Component Value - Date/Time    Blood Culture - Blood, Hand, Left [[id number]]  (Normal) Collected: 10/03/23 1804    Lab Status: Preliminary result Specimen: Blood from Hand, Left Updated: 10/04/23 1815     Blood Culture No growth at 24 hours    Blood Culture - Blood, Arm, Right [[id number]]  (Normal) Collected: 10/03/23 1720    Lab Status: Preliminary result Specimen: Blood from Arm, Right Updated: 10/04/23 1815     Blood Culture No growth at 24 hours    MRSA Screen, PCR (Inpatient) - Swab, Nares [[id number]]  (Normal) Collected: 10/03/23 2327    Lab Status: Final result Specimen: Swab from Nares Updated: 10/04/23 0811     MRSA PCR Negative    Narrative:      The negative predictive value of this diagnostic test is high and should only be used to consider de-escalating anti-MRSA therapy. A positive result may  indicate colonization with MRSA and must be correlated clinically.  MRSA Negative    Respiratory Panel PCR w/COVID-19(SARS-CoV-2) JEN/BETSY/ELIEZER/PAD/COR/MAD/URSULA In-House, NP Swab in UTM/VTM, 3-4 HR TAT - Swab, Nasopharynx [[id number]]  (Normal) Collected: 10/03/23 1726    Lab Status: Final result Specimen: Swab from Nasopharynx Updated: 10/03/23 5995     ADENOVIRUS, PCR Not Detected     Coronavirus 229E Not Detected     Coronavirus HKU1 Not Detected     Coronavirus NL63 Not Detected     Coronavirus OC43 Not Detected     COVID19 Not Detected     Human Metapneumovirus Not Detected     Human Rhinovirus/Enterovirus Not Detected     Influenza A PCR Not Detected     Influenza B PCR Not Detected     Parainfluenza Virus 1 Not Detected     Parainfluenza Virus 2 Not Detected     Parainfluenza Virus 3 Not Detected     Parainfluenza Virus 4 Not Detected     RSV, PCR Not Detected     Bordetella pertussis pcr Not Detected     Bordetella parapertussis PCR Not Detected     Chlamydophila pneumoniae PCR Not Detected     Mycoplasma pneumo by PCR Not Detected    Narrative:      In the setting of a positive respiratory panel with a viral infection PLUS a negative procalcitonin without other underlying concern for bacterial infection, consider observing off antibiotics or discontinuation of antibiotics and continue supportive care. If the respiratory panel is positive for atypical bacterial infection (Bordetella pertussis, Chlamydophila pneumoniae, or Mycoplasma pneumoniae), consider antibiotic de-escalation to target atypical bacterial infection.            CT Head Without Contrast    Result Date: 10/3/2023  CT HEAD WO CONTRAST Date of Exam: 10/3/2023 7:07 PM EDT Indication: gen weakness. Comparison: None available. Technique: Axial CT images were obtained of the head without contrast administration.  Automated exposure control and iterative construction methods were used. FINDINGS: There is no evidence for acute intracranial hemorrhage. No  definitive acute focal ischemia is identified. Nonspecific white matter changes are noted likely related to chronic small vessel ischemic changes and age-related changes. Associated diffuse volume loss is observed. There is no evidence for abnormal cerebral edema. There is no mass effect or midline shift. The ventricular system is nondilated. The basal cisterns are patent. The skull is intact. The paranasal sinuses and mastoid air cells are  clear.     Impression: 1.No evidence for acute intracranial abnormality. 2.Nonspecific white matter changes are noted with associated diffuse volume loss. These findings are likely related to chronic small vessel ischemic changes and/or age-related changes. Electronically Signed: Peter Don MD  10/3/2023 7:13 PM EDT  Workstation ID: [id number]    US Liver    Result Date: 10/4/2023  US LIVER Date of Exam: 10/4/2023 7:00 AM CDT Indication: new cirrhosis. Comparison: No comparisons available. Technique: Grayscale and color Doppler ultrasound evaluation of the right upper quadrant was performed. Findings: LIVER: Nodular contours and heterogeneous parenchymal echotexture consistent with cirrhosis. No focal lesions identified. Normal flow is present within the hepatic vasculature. GALLBLADDER: Gallbladder is surgically absent. The common bile duct measures 2 mm in diameter, normal. PANCREAS:  Visualized portions are unremarkable. AORTA/IVC:  Visualized portions are unremarkable. RIGHT KIDNEY:  Normal in size with no focal lesions. No evidence of nephrolithiasis or hydronephrosis. There is moderate volume ascites. There is partial visualization of a right pleural effusion.     Impression: Impression: 1.Cirrhosis with ascites and right pleural effusion. Electronically Signed: Isaac Smalls MD  10/4/2023 7:34 AM CDT  Workstation ID: [id number]    XR Chest 1 View    Result Date: 10/5/2023  XR CHEST 1 VW Date of Exam: 10/5/2023 3:26 AM CDT Indication: Pleural effusion Comparison:  10/3/2023 Findings: Cardiomediastinal silhouette is unchanged. Persistent pulmonary vascular congestion and interstitial edema. There is central pulmonary edema with small to moderate left and small right pleural effusions. No new airspace disease nor pneumothorax. No acute  osseous abnormality identified.     Impression: Impression: Moderate CHF/volume overload features are similar to prior exam. Electronically Signed: Isaac Smalls MD  10/5/2023 7:00 AM CDT  Workstation ID: [id number]    XR Chest 1 View    Result Date: 10/3/2023  XR CHEST 1 VW Date of Exam: 10/3/2023 4:12 PM CDT Indication: gen weakness Comparison: 7/19/2023 Findings: Cardiomediastinal silhouette is unremarkable. There is left mid to lower lung airspace disease suggestive of pneumonia. Small to moderate left and small right effusions. No acute osseous abnormality identified.     Impression: Impression: 1.Left mid to lower lung airspace disease with small to moderate effusion suggestive of pneumonia. Correlate with symptoms. 2.Persistent small right effusion. Electronically Signed: Isaac Smalls MD  10/3/2023 4:28 PM CDT  Workstation ID: [id number]    US Paracentesis    Result Date: 10/5/2023  US PARACENTESIS  History: new ascites  : Demetrius Izquierdo MD.  Modality: Ultrasonography                   Sedation: None. Anesthesia: Lidocaine 1% local infiltration. Estimated blood loss:  0 cc.        Technique: A thorough discussion of the risks, benefits, and alternatives of the procedure, and if applicable, moderate sedation, was carried out with the patient. They were encouraged to ask any questions. Any questions were answered. They verbalized understanding. A written informed consent was then signed.  A multi-component timeout was performed prior to starting the procedure using the departmental protocol. The procedure room personnel used personal protective equipment. The operators used sterile gloves and if indicated, sterile gowns. The  surgical site was prepped with chlorhexidine gluconate  and draped in the maximal applicable sterile fashion. A preliminary ultrasonography was performed to assess the target and determine a safe access site. It showed ascites. Pertinent ultrasound images were stored to the PACS for documentation. The access site was sterilely prepped and draped. Local anesthesia was administered. A dermatotomy was performed if needed. A catheter over the needle system was advanced into the peritoneal cavity. Straw colored fluid was aspirated and the plastic catheter advanced into the peritoneum. The catheter was then connected to a fluid recovery system. At the end of the procedure, the catheter was withdrawn and an aseptic dressing applied. The patient tolerated the procedure well. After uneventful recovery recovery, the patient was discharged from the department in stable condition. The total amount of fluid recovered is given below. Albumin was infused intravenously if ordered by the referring doctor. Complications: None immediate. Specimen: The specimen was labeled and sent to the lab in appropriate carriers & containers if such was requested by the ordering provider.     Impression: Impression:                                                              Successful ultrasound-guided paracentesis using a Right lower quadrant access with recovery of 3.3 liters of fluid as described above. Thank you for the opportunity to assist in the care of your patient. Electronically Signed: Demetrius Izquierdo MD  10/5/2023 8:31 AM EDT  Workstation ID: [id number]    CT Angiogram Chest    Result Date: 10/3/2023  CT ANGIOGRAM CHEST Date of Exam: 10/3/2023 7:07 PM EDT Indication: SOB, hypoxic, elevated dimer. Comparison: None available. Technique: CTA of the chest was performed after the uneventful intravenous administration of 85 mL Isovue 370. Reconstructed coronal and sagittal images were also obtained. In addition, a 3-D volume rendered image was  created for interpretation. Automated exposure control and iterative reconstruction methods were used. Findings: There is generally good contrast opacification of the pulmonary arteries and no evidence of embolic disease. No evidence of thoracic aortic aneurysm or dissection is seen. There is probably high-grade origin stenosis of the left subclavian artery, with very dense calcification of the lumen on image 26 series 4. No pericardial effusion or mediastinal adenopathy is seen. There is diffuse coronary artery calcium. Multiple macrocalcifications are seen in the normal sized thyroid There is a large free-flowing left pleural effusion and moderate free-flowing right pleural effusion. There is nearly complete atelectasis of the left lower lobe and milder right lower lobe atelectasis. Mild micro bullous change is seen of the upper lobes. No other evidence of active pulmonary parenchymal disease is appreciated. There is a calcified right middle lobe granuloma. The airways appear to be normally patent. Included images of the upper abdomen show extensive ascites. Liver morphology suggests cirrhosis, with marked enlargement of the left liver lobe and small right liver lobe. There appear to be clips in the gallbladder fossa. Spleen is normal in size. Bony structures appear to be intact.     Impression: Impression: 1. No evidence of pulmonary embolic disease. 2. Large free-flowing left pleural effusion and moderate right effusion. 3. Extensive atelectasis of the left lower lobe due to effusion and mild right lower lobe atelectasis. 4. Liver morphology consistent with cirrhosis. Upper abdominal ascites, on these limited images, appears to be extensive. 5. Incidentally noted heavy calcification of the left subclavian artery origin, suggesting high-grade stenosis, or possibly occlusion. Electronically Signed: Brian Sutherland MD  10/3/2023 7:29 PM EDT  Workstation ID: [id number]         Current medications:  Scheduled Meds:aspirin,  81 mg, Oral, Daily  atorvastatin, 40 mg, Oral, Nightly  budesonide-formoterol, 2 puff, Inhalation, BID - RT   And  tiotropium bromide monohydrate, 2 puff, Inhalation, Daily - RT  cefTRIAXone, 2,000 mg, Intravenous, Q24H  citalopram, 20 mg, Oral, Daily  doxycycline, 100 mg, Intravenous, Q12H  heparin (porcine), 5,000 Units, Subcutaneous, Q12H  metoprolol tartrate, 12.5 mg, Oral, BID With Meals  midodrine, 10 mg, Oral, TID AC  pantoprazole, 40 mg, Oral, Daily  rifAXIMin, 550 mg, Oral, Q12H  roflumilast, 250 mcg, Oral, Daily  saccharomyces boulardii, 250 mg, Oral, BID  senna-docusate sodium, 2 tablet, Oral, BID  sodium chloride, 10 mL, Intravenous, Q12H  traZODone, 150 mg, Oral, Nightly      Continuous Infusions:   PRN Meds:.  acetaminophen    senna-docusate sodium **AND** polyethylene glycol **AND** bisacodyl **AND** bisacodyl    Calcium Replacement - Follow Nurse / BPA Driven Protocol    HYDROcodone-acetaminophen    hydrOXYzine    Magnesium Standard Dose Replacement - Follow Nurse / BPA Driven Protocol    melatonin    Morphine    Phosphorus Replacement - Follow Nurse / BPA Driven Protocol    Potassium Replacement - Follow Nurse / BPA Driven Protocol    sodium chloride    sodium chloride    Assessment & Plan   Assessment & Plan     Active Hospital Problems    Diagnosis  POA    **Acute respiratory failure with hypoxia [J96.01]  Yes    Severe malnutrition [E43]  Yes    CHF (congestive heart failure) [I50.9]  Yes    Anemia [D64.9]  Yes    Anxiety and depression [F41.9, F32.A]  Yes    Chronic back pain [M54.9, G89.29]  Yes    HEIDY (acute kidney injury) [N17.9]  Yes    Hyponatremia [E87.1]  Yes    Cirrhosis of liver [K74.60]  Yes    Generalized weakness [R53.1]  Yes    Cellulitis of right lower extremity [L03.115]  Yes    GERD (gastroesophageal reflux disease) [K21.9]  Yes    Hypertension [I10]  Yes    Atherosclerosis of native arteries of extremities with intermittent claudication, other extremity [I70.218]  Yes    Chronic  obstructive pulmonary disease [J44.9]  Yes    Hyperlipidemia [E78.5]  Yes      Resolved Hospital Problems   No resolved problems to display.        Brief Hospital Course to date:  73 to female here with dyspnea x 3 days and weakness. Probable new diagnosis of cirrhosis with ascites and pleural effusions.       Left > right pleural effusion  Cirrhosis/ascites (CTA chest) - new finding   Hypoalbuminemia , 2.3   - CTA chest demonstrates mod left effusion and smaller R effusion; cirrhotic liver with large ascites noted  - Liver US with cirrhosis, ascites and right pleural effusion  - GI consulted  - Diagnosis/therapeutic paracentesis 10/4 with 3.3L removed. SAAG consistent with protal HTN. No SBP  - pt reports history of fatty liver. Denies alcoholism.    GOC -- palliative and hospice consulted. Morphine added for pain     Dyspnea and hypoxia   - she was recently [place] at [facility] and diagnosed w CHF, but had echo showing EF 75%; diuretic increased   - CTA chest with no PE, large left pleural effusion and moderate right pleural effusion, atelectasis  - Continue treatment for ? PNA for now. -- can likely de-escalate soon   - Improvement in resp status following paracentesis.      HEIDY  - Cr baseline 0.9, trending up  - likely hepatorenal. Urine Na < 20. UA negative. S/p albumin.  - Holding diuretics. Increase midodrine      Possible HFpEF (vs ascites and hypoalbuminemia causing pl effusions)   HTN HLD  - recent admission with echo at [facility] [place]:  records from OSH pending  - Currently holding bumex due to HEIDY  - Hold metoprolol due to hypotension   - continue statin  - daily weights  - strict I&O     Acute onset weakness  - until a couple weeks ago she could walk without a cane or walker  - now using a wheelchair  - PT OT   -  on high protein diet      Lightheadedness  Borderline hypotn  - holding metoprolol and bumex. Continue albumin and midodrine      COPD, emphysema   - no on supplemental oxygen at home  - former  smoker, now vapes. Encouraged cessation  - continue with inhalers      Hyponatremia hypervolemic   - new finding.   - cortisol appropriate. TSH      PVD  - incidental finding of atherosclerosis of left subclavian artery, suggesting high-grade stenosis, or possibly occlusion.   - consider vascular input vs outpatient w.u     Generalized weakness  - PT/OT     Anemia  - H/H stable and at baseline     GERD  - PPI     Anxiety/depression  - Celexa, Trazodone, consider holding pending progression of hyponatremia     Expected Discharge Location and Transportation: TBD  Expected Discharge   Expected Discharge Date: 10/6/2023; Expected Discharge Time:      DVT prophylaxis:  Medical DVT prophylaxis orders are present.     AM-PAC 6 Clicks Score (PT): 12 (10/05/23 0815)    CODE STATUS:   Code Status and Medical Interventions:   Ordered at: 10/03/23 2043     Level Of Support Discussed With:    Patient     Code Status (Patient has no pulse and is not breathing):    CPR (Attempt to Resuscitate)     Medical Interventions (Patient has pulse or is breathing):    Full Support       Anna William,   10/05/23

## 2023-10-05 NOTE — PLAN OF CARE
Goal Outcome Evaluation:  Plan of Care Reviewed With: patient           Outcome Evaluation: Pt presents below baseline d/t pain, weakness and decreased activity toelrance.  Pt setup to wash face, dep LBD.  Pt declined all mobility d/t increased pain with all movement. OT will follow to advance pt toward increased independence with self care/mobility.  Recommend SNF upon d/c.      Anticipated Discharge Disposition (OT): skilled nursing facility

## 2023-10-05 NOTE — PROGRESS NOTES
Malnutrition Severity Assessment    Patient Name:  Laura Hui  YOB: 1950  MRN: [id number]  Admit Date:  10/3/2023    Patient meets criteria for : Severe Malnutrition (PO intakes <75% EEN >/=1m, loss 10.1% body weight X past 3 months, and moderate muscle wasting.)    Malnutrition Severity Assessment  Malnutrition Type: Chronic Disease - Related Malnutrition  Malnutrition Type (last 8 hours)       Malnutrition Severity Assessment       Row Name 10/05/23 1318       Malnutrition Severity Assessment    Malnutrition Type Chronic Disease - Related Malnutrition      Row Name 10/05/23 1318       Insufficient Energy Intake     Insufficient Energy Intake Findings Severe    Insufficient Energy Intake  <75% of est. energy requirement for > or equal to 1 month      Row Name 10/05/23 1318       Unintentional Weight Loss     Unintentional Weight Loss Findings Severe  loss 10.1% body weight X past 3 months    Unintentional Weight Loss  Weight loss greater than 7.5% in three months      Row Name 10/05/23 1318       Muscle Loss    Loss of Muscle Mass Findings Moderate    Clavicle Bone Region Moderate - some protrusion in females, visible in males    Acromion Bone Region Moderate - acromion may slightly protrude    Scapular Bone Region Moderate - mild depression, bones may show slightly    Dorsal Hand Region Moderate - slight depression      Row Name 10/05/23 1318       Criteria Met (Must meet criteria for severity in at least 2 of these categories: M Wasting, Fat Loss, Fluid, Secondary Signs, Wt. Status, Intake)    Patient meets criteria for  Severe Malnutrition  PO intakes <75% EEN >/=1m, loss 10.1% body weight X past 3 months, and moderate muscle wasting.                    Electronically signed by:  Grisel Navarro RD  10/05/23 13:19 EDT

## 2023-10-06 ENCOUNTER — TELEPHONE (OUTPATIENT)
Dept: FAMILY MEDICINE CLINIC | Facility: CLINIC | Age: 73
End: 2023-10-06
Payer: MEDICARE

## 2023-10-06 LAB
ALBUMIN SERPL-MCNC: 3.6 G/DL (ref 3.5–5.2)
ALBUMIN/GLOB SERPL: 1.9 G/DL
ALP SERPL-CCNC: 164 U/L (ref 39–117)
ALT SERPL W P-5'-P-CCNC: 15 U/L (ref 1–33)
ANION GAP SERPL CALCULATED.3IONS-SCNC: 19 MMOL/L (ref 5–15)
AST SERPL-CCNC: 40 U/L (ref 1–32)
BILIRUB SERPL-MCNC: 0.9 MG/DL (ref 0–1.2)
BUN SERPL-MCNC: 30 MG/DL (ref 8–23)
BUN/CREAT SERPL: 12.6 (ref 7–25)
CALCIUM SPEC-SCNC: 10.1 MG/DL (ref 8.6–10.5)
CENTROMERE B AB SER-ACNC: <0.2 AI (ref 0–0.9)
CHLORIDE SERPL-SCNC: 95 MMOL/L (ref 98–107)
CHROMATIN AB SERPL-ACNC: <0.2 AI (ref 0–0.9)
CO2 SERPL-SCNC: 15 MMOL/L (ref 22–29)
CREAT SERPL-MCNC: 2.39 MG/DL (ref 0.57–1)
DEPRECATED RDW RBC AUTO: 55.7 FL (ref 37–54)
DSDNA AB SER-ACNC: 1 IU/ML (ref 0–9)
EGFRCR SERPLBLD CKD-EPI 2021: 21 ML/MIN/1.73
ENA JO1 AB SER-ACNC: <0.2 AI (ref 0–0.9)
ENA RNP AB SER-ACNC: <0.2 AI (ref 0–0.9)
ENA SCL70 AB SER-ACNC: <0.2 AI (ref 0–0.9)
ENA SM AB SER-ACNC: <0.2 AI (ref 0–0.9)
ENA SS-A AB SER-ACNC: <0.2 AI (ref 0–0.9)
ENA SS-B AB SER-ACNC: <0.2 AI (ref 0–0.9)
ERYTHROCYTE [DISTWIDTH] IN BLOOD BY AUTOMATED COUNT: 16.1 % (ref 12.3–15.4)
GLOBULIN UR ELPH-MCNC: 1.9 GM/DL
GLUCOSE SERPL-MCNC: 74 MG/DL (ref 65–99)
HAV AB SER QL IA: POSITIVE
HCT VFR BLD AUTO: 31.3 % (ref 34–46.6)
HGB BLD-MCNC: 10.6 G/DL (ref 12–15.9)
Lab: NORMAL
MCH RBC QN AUTO: 31.9 PG (ref 26.6–33)
MCHC RBC AUTO-ENTMCNC: 33.9 G/DL (ref 31.5–35.7)
MCV RBC AUTO: 94.3 FL (ref 79–97)
MITOCHONDRIA M2 IGG SER-ACNC: <20 UNITS (ref 0–20)
PHOSPHATE SERPL-MCNC: 3.7 MG/DL (ref 2.5–4.5)
PLATELET # BLD AUTO: 140 10*3/MM3 (ref 140–450)
PMV BLD AUTO: 10 FL (ref 6–12)
POTASSIUM SERPL-SCNC: 4.5 MMOL/L (ref 3.5–5.2)
PROT SERPL-MCNC: 5.5 G/DL (ref 6–8.5)
QT INTERVAL: 448 MS
QTC INTERVAL: 504 MS
RBC # BLD AUTO: 3.32 10*6/MM3 (ref 3.77–5.28)
SMA IGG SER-ACNC: 3 UNITS (ref 0–19)
SODIUM SERPL-SCNC: 129 MMOL/L (ref 136–145)
WBC NRBC COR # BLD: 9.4 10*3/MM3 (ref 3.4–10.8)

## 2023-10-06 PROCEDURE — 94799 UNLISTED PULMONARY SVC/PX: CPT

## 2023-10-06 PROCEDURE — 94664 DEMO&/EVAL PT USE INHALER: CPT

## 2023-10-06 PROCEDURE — 25010000002 ALBUMIN HUMAN 25% PER 50 ML: Performed by: INTERNAL MEDICINE

## 2023-10-06 PROCEDURE — 84100 ASSAY OF PHOSPHORUS: CPT | Performed by: INTERNAL MEDICINE

## 2023-10-06 PROCEDURE — 85027 COMPLETE CBC AUTOMATED: CPT | Performed by: INTERNAL MEDICINE

## 2023-10-06 PROCEDURE — 25010000002 MORPHINE PER 10 MG: Performed by: INTERNAL MEDICINE

## 2023-10-06 PROCEDURE — 25010000002 HEPARIN (PORCINE) PER 1000 UNITS: Performed by: PHYSICIAN ASSISTANT

## 2023-10-06 PROCEDURE — 80053 COMPREHEN METABOLIC PANEL: CPT | Performed by: INTERNAL MEDICINE

## 2023-10-06 PROCEDURE — P9047 ALBUMIN (HUMAN), 25%, 50ML: HCPCS | Performed by: INTERNAL MEDICINE

## 2023-10-06 PROCEDURE — 25010000002 CEFTRIAXONE PER 250 MG: Performed by: PHYSICIAN ASSISTANT

## 2023-10-06 RX ORDER — ALBUMIN (HUMAN) 12.5 G/50ML
25 SOLUTION INTRAVENOUS ONCE
Status: COMPLETED | OUTPATIENT
Start: 2023-10-06 | End: 2023-10-06

## 2023-10-06 RX ADMIN — PANTOPRAZOLE SODIUM 40 MG: 40 TABLET, DELAYED RELEASE ORAL at 08:58

## 2023-10-06 RX ADMIN — HYDROCODONE BITARTRATE AND ACETAMINOPHEN 1 TABLET: 5; 325 TABLET ORAL at 10:35

## 2023-10-06 RX ADMIN — CITALOPRAM HYDROBROMIDE 20 MG: 20 TABLET ORAL at 08:58

## 2023-10-06 RX ADMIN — TIOTROPIUM BROMIDE INHALATION SPRAY 2 PUFF: 3.12 SPRAY, METERED RESPIRATORY (INHALATION) at 08:47

## 2023-10-06 RX ADMIN — BUDESONIDE AND FORMOTEROL FUMARATE DIHYDRATE 2 PUFF: 160; 4.5 AEROSOL RESPIRATORY (INHALATION) at 08:47

## 2023-10-06 RX ADMIN — HYDROCODONE BITARTRATE AND ACETAMINOPHEN 1 TABLET: 5; 325 TABLET ORAL at 04:14

## 2023-10-06 RX ADMIN — MIDODRINE HYDROCHLORIDE 10 MG: 10 TABLET ORAL at 17:45

## 2023-10-06 RX ADMIN — DOXYCYCLINE 100 MG: 100 INJECTION, POWDER, LYOPHILIZED, FOR SOLUTION INTRAVENOUS at 06:19

## 2023-10-06 RX ADMIN — MORPHINE SULFATE 1 MG: 2 INJECTION, SOLUTION INTRAMUSCULAR; INTRAVENOUS at 23:27

## 2023-10-06 RX ADMIN — MIDODRINE HYDROCHLORIDE 10 MG: 10 TABLET ORAL at 08:58

## 2023-10-06 RX ADMIN — CEFTRIAXONE 2000 MG: 2 INJECTION, POWDER, FOR SOLUTION INTRAMUSCULAR; INTRAVENOUS at 00:51

## 2023-10-06 RX ADMIN — HEPARIN SODIUM 5000 UNITS: 5000 INJECTION, SOLUTION INTRAVENOUS; SUBCUTANEOUS at 08:57

## 2023-10-06 RX ADMIN — Medication 12.5 MG: at 17:45

## 2023-10-06 RX ADMIN — ASPIRIN 81 MG CHEWABLE TABLET 81 MG: 81 TABLET CHEWABLE at 08:58

## 2023-10-06 RX ADMIN — MIDODRINE HYDROCHLORIDE 10 MG: 10 TABLET ORAL at 11:39

## 2023-10-06 RX ADMIN — Medication 250 MG: at 08:59

## 2023-10-06 RX ADMIN — MORPHINE SULFATE 1 MG: 2 INJECTION, SOLUTION INTRAMUSCULAR; INTRAVENOUS at 12:32

## 2023-10-06 RX ADMIN — Medication 12.5 MG: at 08:58

## 2023-10-06 RX ADMIN — RIFAXIMIN 550 MG: 550 TABLET ORAL at 06:19

## 2023-10-06 RX ADMIN — MORPHINE SULFATE 1 MG: 2 INJECTION, SOLUTION INTRAMUSCULAR; INTRAVENOUS at 17:44

## 2023-10-06 RX ADMIN — ROFLUMILAST 250 MCG: 500 TABLET ORAL at 08:58

## 2023-10-06 RX ADMIN — BUDESONIDE AND FORMOTEROL FUMARATE DIHYDRATE 2 PUFF: 160; 4.5 AEROSOL RESPIRATORY (INHALATION) at 19:31

## 2023-10-06 RX ADMIN — TRAZODONE HYDROCHLORIDE 150 MG: 50 TABLET ORAL at 20:27

## 2023-10-06 RX ADMIN — ALBUMIN (HUMAN) 25 G: 0.25 INJECTION, SOLUTION INTRAVENOUS at 14:58

## 2023-10-06 RX ADMIN — ALBUMIN (HUMAN) 25 G: 0.25 INJECTION, SOLUTION INTRAVENOUS at 04:14

## 2023-10-06 RX ADMIN — RIFAXIMIN 550 MG: 550 TABLET ORAL at 17:45

## 2023-10-06 NOTE — CASE MANAGEMENT/SOCIAL WORK
Continued Stay Note  [facility]     Patient Name: Laura Hui  MRN: [id number]  Today's Date: 10/6/2023    Admit Date: 10/3/2023    Plan: TBD   Discharge Plan       Row Name 10/06/23 1032       Plan    Plan TBD    Plan Comments Have noted Hospice has been consulted. I will continue to follow for DC planning needs as needed.    Final Discharge Disposition Code 01 - home or self-care                   Discharge Codes    No documentation.                 Expected Discharge Date and Time       Expected Discharge Date Expected Discharge Time    Oct 9, 2023               Alice Menjivar RN

## 2023-10-06 NOTE — CONSULTS
Patient appreciated  visit, but was not feeling well enough for a conversation. She said that her daughter was supportive and would be with her today. She asked for prayer, especially regarding her pain in abdomen and her breathing.

## 2023-10-06 NOTE — PLAN OF CARE
Problem: Adult Inpatient Plan of Care  Goal: Plan of Care Review  Outcome: Ongoing, Progressing  Goal: Patient-Specific Goal (Individualized)  Outcome: Ongoing, Progressing  Goal: Absence of Hospital-Acquired Illness or Injury  Outcome: Ongoing, Progressing  Intervention: Identify and Manage Fall Risk  Recent Flowsheet Documentation  Taken 10/6/2023 0015 by Esther Kent RN  Safety Promotion/Fall Prevention: safety round/check completed  Taken 10/5/2023 2208 by Esther Kent RN  Safety Promotion/Fall Prevention: safety round/check completed  Taken 10/5/2023 2005 by Esther Kent RN  Safety Promotion/Fall Prevention: safety round/check completed  Intervention: Prevent Skin Injury  Recent Flowsheet Documentation  Taken 10/6/2023 0015 by Esther Kent RN  Body Position: weight shifting  Taken 10/5/2023 2208 by Esther Kent RN  Body Position: weight shifting  Taken 10/5/2023 2005 by Esther Kent RN  Body Position: weight shifting  Intervention: Prevent and Manage VTE (Venous Thromboembolism) Risk  Recent Flowsheet Documentation  Taken 10/6/2023 0015 by Esther Kent RN  Activity Management: activity encouraged  Taken 10/5/2023 2208 by Esther Kent RN  Activity Management: activity encouraged  Taken 10/5/2023 2005 by Esther Kent RN  Activity Management: activity encouraged  Range of Motion: active ROM (range of motion) encouraged  Intervention: Prevent Infection  Recent Flowsheet Documentation  Taken 10/6/2023 0015 by Esther Kent RN  Infection Prevention: environmental surveillance performed  Taken 10/5/2023 2208 by Esther Kent RN  Infection Prevention: environmental surveillance performed  Taken 10/5/2023 2005 by Esther Kent RN  Infection Prevention: environmental surveillance performed  Goal: Optimal Comfort and Wellbeing  Outcome: Ongoing, Progressing  Intervention: Provide Person-Centered Care  Recent Flowsheet Documentation  Taken 10/5/2023 2005 by Esther Kent RN  Trust  Relationship/Rapport:   care explained   choices provided   questions answered   thoughts/feelings acknowledged  Goal: Readiness for Transition of Care  Outcome: Ongoing, Progressing     Problem: Fall Injury Risk  Goal: Absence of Fall and Fall-Related Injury  Outcome: Ongoing, Progressing  Intervention: Promote Injury-Free Environment  Recent Flowsheet Documentation  Taken 10/6/2023 0015 by Esther Kent RN  Safety Promotion/Fall Prevention: safety round/check completed  Taken 10/5/2023 2208 by Eshter Kent RN  Safety Promotion/Fall Prevention: safety round/check completed  Taken 10/5/2023 2005 by Esther Kent RN  Safety Promotion/Fall Prevention: safety round/check completed     Problem: Skin Injury Risk Increased  Goal: Skin Health and Integrity  Outcome: Ongoing, Progressing  Intervention: Optimize Skin Protection  Recent Flowsheet Documentation  Taken 10/6/2023 0015 by Esther Kent RN  Head of Bed (HOB) Positioning: HOB elevated  Taken 10/5/2023 2208 by Esther Kent RN  Head of Bed (HOB) Positioning: HOB elevated  Taken 10/5/2023 2005 by Esther Kent RN  Head of Bed (HOB) Positioning: HOB elevated   Goal Outcome Evaluation:      No acute events during shift   Incontinence care provided   Activity encouraged   Bladder Scan   A&O x4  Room air to 2L via nasal cannula   NSR to Sinus Tach

## 2023-10-06 NOTE — TELEPHONE ENCOUNTER
Greer with [facility] navigators called regarding patient's hospice care referral.     She is wanting to know if Dr. Barboza will follow the patient in hospice care and is also requesting a verbal statement that the patient has a terminal illness that has occurred within 6 months or greater.     Please call back at [phone]

## 2023-10-06 NOTE — PROGRESS NOTES
[facility] Medicine Services  PROGRESS NOTE    Patient Name: Laura Hui  : 1950  MRN: [id number]    Date of Admission: 10/3/2023  Primary Care Physician: Carmella Barboza DO    Subjective   Subjective     CC:  Cirrhosis, SOA    HPI:  No acute events. Reviewed labs and worsening renal function. States she is in pain and PO medication doesn't help. Reviewed she has IV ordered with patient. Nursing aware. Called and updated daughter -- she will discuss with patient regarding comfort approach.       Objective   Objective     Vital Signs:   Temp:  [97.4 øF (36.3 øC)-98.1 øF (36.7 øC)] 97.6 øF (36.4 øC)  Heart Rate:  [] 83  Resp:  [16-18] 18  BP: (109-116)/(57-88) 116/66  Flow (L/min):  [2] 2     Physical Exam:  Constitutional: ill appearing   HENT: NCAT, mucous membranes moist  Respiratory: diminished, poor effort  Cardiovascular: RRR, no murmurs, rubs, or gallops  Gastrointestinal: Positive bowel sounds, mild distention, tender   Musculoskeletal: trace bilateral ankle edema  Psychiatric: Appropriate affect, cooperative  Neurologic: Oriented x 3, strength symmetric in all extremities, Cranial Nerves grossly intact to confrontation, speech clear  Skin: No rashes      Results Reviewed:  LAB RESULTS:      Lab 10/06/23  0346 10/05/23  0339 10/04/23  0453 10/03/23  1804 10/03/23  1720   WBC 9.40 9.13 9.18  --  10.93*   HEMOGLOBIN 10.6* 11.2* 11.7*  --  14.6   HEMATOCRIT 31.3* 32.7* 34.3  --  42.2   PLATELETS 140 127* 143  --  213   NEUTROS ABS  --   --  7.52*  --  9.28*   IMMATURE GRANS (ABS)  --   --  0.04  --  0.05   LYMPHS ABS  --   --  0.69*  --  0.77   MONOS ABS  --   --  0.88  --  0.80   EOS ABS  --   --  0.03  --  0.02   MCV 94.3 95.3 94.5  --  94.4   PROCALCITONIN  --   --   --   --  0.97*   LACTATE  --   --   --  1.9  --    PROTIME  --   --   --   --  15.2*   D DIMER QUANT  --   --   --   --  2.84*         Lab 10/06/23  0346 10/05/23  0339 10/04/23  0453 10/03/23  6774  10/03/23  1720   SODIUM 129* 127* 127*  --  128*   POTASSIUM 4.5 4.6 4.5  --  5.0   CHLORIDE 95* 95* 96*  --  96*   CO2 15.0* 17.0* 16.0*  --  19.0*   ANION GAP 19.0* 15.0 15.0  --  13.0   BUN 30* 24* 20  --  21   CREATININE 2.39* 2.03* 1.59*  --  1.41*   EGFR 21.0* 25.5* 34.2*  --  39.5*   GLUCOSE 74 77 69  --  91   CALCIUM 10.1 9.1 8.7  --  9.1   MAGNESIUM  --   --   --  1.6 2.0   PHOSPHORUS 3.7  --   --   --   --    TSH  --   --   --   --  4.920*         Lab 10/06/23  0346 10/05/23  0339 10/04/23  0453 10/03/23  1720   TOTAL PROTEIN 5.5* 4.6* 4.7* 5.5*   ALBUMIN 3.6 2.7* 2.3* 2.3*   GLOBULIN 1.9 1.9 2.4 3.2   ALT (SGPT) 15 18 19 17   AST (SGOT) 40* 51* 55* 71*   BILIRUBIN 0.9 0.8 0.9 1.1   ALK PHOS 164* 180* 194* 262*         Lab 10/03/23  2222 10/03/23  1950/23  1720   PROBNP  --   --  10,567.0*   HSTROP T 77* 74* 80*   PROTIME  --   --  15.2*   INR  --   --  1.19*             Lab 10/04/23  0453   IRON 50   IRON SATURATION (TSAT) 35   TIBC 143*   TRANSFERRIN 96*         Lab 10/03/23  2328   PH, ARTERIAL 7.454*   PCO2, ARTERIAL 25.7*   PO2 ART 82.6*   FIO2 32   HCO3 ART 18.0*   BASE EXCESS ART -4.4*   CARBOXYHEMOGLOBIN 0.9     Brief Urine Lab Results  (Last result in the past 365 days)        Color   Clarity   Blood   Leuk Est   Nitrite   Protein   CREAT   Urine HCG        10/05/23 1322 Yellow   Clear   Negative   Negative   Negative   Negative                   Microbiology Results Abnormal       Procedure Component Value - Date/Time    Blood Culture - Blood, Hand, Left [[id number]]  (Normal) Collected: 10/03/23 1804    Lab Status: Preliminary result Specimen: Blood from Hand, Left Updated: 10/05/23 1815     Blood Culture No growth at 2 days    Blood Culture - Blood, Arm, Right [[id number]]  (Normal) Collected: 10/03/23 1720    Lab Status: Preliminary result Specimen: Blood from Arm, Right Updated: 10/05/23 1815     Blood Culture No growth at 2 days    MRSA Screen, PCR (Inpatient) - Swab, Nares [[id number]]   (Normal) Collected: 10/03/23 2327    Lab Status: Final result Specimen: Swab from Nares Updated: 10/04/23 0811     MRSA PCR Negative    Narrative:      The negative predictive value of this diagnostic test is high and should only be used to consider de-escalating anti-MRSA therapy. A positive result may indicate colonization with MRSA and must be correlated clinically.  MRSA Negative    Respiratory Panel PCR w/COVID-19(SARS-CoV-2) JEN/BETSY/ELIEZER/PAD/COR/MAD/URSULA In-House, NP Swab in UTM/VTM, 3-4 HR TAT - Swab, Nasopharynx [[id number]]  (Normal) Collected: 10/03/23 1726    Lab Status: Final result Specimen: Swab from Nasopharynx Updated: 10/03/23 1825     ADENOVIRUS, PCR Not Detected     Coronavirus 229E Not Detected     Coronavirus HKU1 Not Detected     Coronavirus NL63 Not Detected     Coronavirus OC43 Not Detected     COVID19 Not Detected     Human Metapneumovirus Not Detected     Human Rhinovirus/Enterovirus Not Detected     Influenza A PCR Not Detected     Influenza B PCR Not Detected     Parainfluenza Virus 1 Not Detected     Parainfluenza Virus 2 Not Detected     Parainfluenza Virus 3 Not Detected     Parainfluenza Virus 4 Not Detected     RSV, PCR Not Detected     Bordetella pertussis pcr Not Detected     Bordetella parapertussis PCR Not Detected     Chlamydophila pneumoniae PCR Not Detected     Mycoplasma pneumo by PCR Not Detected    Narrative:      In the setting of a positive respiratory panel with a viral infection PLUS a negative procalcitonin without other underlying concern for bacterial infection, consider observing off antibiotics or discontinuation of antibiotics and continue supportive care. If the respiratory panel is positive for atypical bacterial infection (Bordetella pertussis, Chlamydophila pneumoniae, or Mycoplasma pneumoniae), consider antibiotic de-escalation to target atypical bacterial infection.            XR Chest 1 View    Result Date: 10/5/2023  XR CHEST 1 VW Date of Exam: 10/5/2023 3:26  AM CDT Indication: Pleural effusion Comparison: 10/3/2023 Findings: Cardiomediastinal silhouette is unchanged. Persistent pulmonary vascular congestion and interstitial edema. There is central pulmonary edema with small to moderate left and small right pleural effusions. No new airspace disease nor pneumothorax. No acute  osseous abnormality identified.     Impression: Impression: Moderate CHF/volume overload features are similar to prior exam. Electronically Signed: Isaac Smalls MD  10/5/2023 7:00 AM CDT  Workstation ID: [id number]    US Paracentesis    Result Date: 10/5/2023  US PARACENTESIS  History: new ascites  : Demetrius Izquierdo MD.  Modality: Ultrasonography                   Sedation: None. Anesthesia: Lidocaine 1% local infiltration. Estimated blood loss:  0 cc.        Technique: A thorough discussion of the risks, benefits, and alternatives of the procedure, and if applicable, moderate sedation, was carried out with the patient. They were encouraged to ask any questions. Any questions were answered. They verbalized understanding. A written informed consent was then signed.  A multi-component timeout was performed prior to starting the procedure using the departmental protocol. The procedure room personnel used personal protective equipment. The operators used sterile gloves and if indicated, sterile gowns. The surgical site was prepped with chlorhexidine gluconate  and draped in the maximal applicable sterile fashion. A preliminary ultrasonography was performed to assess the target and determine a safe access site. It showed ascites. Pertinent ultrasound images were stored to the PACS for documentation. The access site was sterilely prepped and draped. Local anesthesia was administered. A dermatotomy was performed if needed. A catheter over the needle system was advanced into the peritoneal cavity. Straw colored fluid was aspirated and the plastic catheter advanced into the peritoneum. The  catheter was then connected to a fluid recovery system. At the end of the procedure, the catheter was withdrawn and an aseptic dressing applied. The patient tolerated the procedure well. After uneventful recovery recovery, the patient was discharged from the department in stable condition. The total amount of fluid recovered is given below. Albumin was infused intravenously if ordered by the referring doctor. Complications: None immediate. Specimen: The specimen was labeled and sent to the lab in appropriate carriers & containers if such was requested by the ordering provider.     Impression: Impression:                                                              Successful ultrasound-guided paracentesis using a Right lower quadrant access with recovery of 3.3 liters of fluid as described above. Thank you for the opportunity to assist in the care of your patient. Electronically Signed: Demetrius Izquierdo MD  10/5/2023 8:31 AM EDT  Workstation ID: [id number]         Current medications:  Scheduled Meds:albumin human, 25 g, Intravenous, Once  aspirin, 81 mg, Oral, Daily  atorvastatin, 40 mg, Oral, Nightly  budesonide-formoterol, 2 puff, Inhalation, BID - RT   And  tiotropium bromide monohydrate, 2 puff, Inhalation, Daily - RT  cefTRIAXone, 2,000 mg, Intravenous, Q24H  citalopram, 20 mg, Oral, Daily  doxycycline, 100 mg, Intravenous, Q12H  heparin (porcine), 5,000 Units, Subcutaneous, Q12H  metoprolol tartrate, 12.5 mg, Oral, BID With Meals  midodrine, 10 mg, Oral, TID AC  pantoprazole, 40 mg, Oral, Daily  rifAXIMin, 550 mg, Oral, Q12H  roflumilast, 250 mcg, Oral, Daily  saccharomyces boulardii, 250 mg, Oral, BID  senna-docusate sodium, 2 tablet, Oral, BID  sodium chloride, 10 mL, Intravenous, Q12H  traZODone, 150 mg, Oral, Nightly      Continuous Infusions:   PRN Meds:.  acetaminophen    senna-docusate sodium **AND** polyethylene glycol **AND** bisacodyl **AND** bisacodyl    Calcium Replacement - Follow Nurse / BPA Driven  Protocol    HYDROcodone-acetaminophen    hydrOXYzine    Magnesium Standard Dose Replacement - Follow Nurse / BPA Driven Protocol    melatonin    Morphine    Phosphorus Replacement - Follow Nurse / BPA Driven Protocol    Potassium Replacement - Follow Nurse / BPA Driven Protocol    sodium chloride    sodium chloride    Assessment & Plan   Assessment & Plan     Active Hospital Problems    Diagnosis  POA    **Acute respiratory failure with hypoxia [J96.01]  Yes    Severe malnutrition [E43]  Yes    CHF (congestive heart failure) [I50.9]  Yes    Anemia [D64.9]  Yes    Anxiety and depression [F41.9, F32.A]  Yes    Chronic back pain [M54.9, G89.29]  Yes    HEDIY (acute kidney injury) [N17.9]  Yes    Hyponatremia [E87.1]  Yes    Cirrhosis of liver [K74.60]  Yes    Generalized weakness [R53.1]  Yes    Cellulitis of right lower extremity [L03.115]  Yes    GERD (gastroesophageal reflux disease) [K21.9]  Yes    Hypertension [I10]  Yes    Atherosclerosis of native arteries of extremities with intermittent claudication, other extremity [I70.218]  Yes    Chronic obstructive pulmonary disease [J44.9]  Yes    Hyperlipidemia [E78.5]  Yes      Resolved Hospital Problems   No resolved problems to display.        Brief Hospital Course to date:  73 to female here with dyspnea x 3 days and weakness. Probable new diagnosis of cirrhosis with ascites and pleural effusions.       Left > right pleural effusion  Cirrhosis/ascites (CTA chest) - new finding   Hypoalbuminemia , 2.3   - CTA chest demonstrates mod left effusion and smaller R effusion; cirrhotic liver with large ascites noted  - Liver US with cirrhosis, ascites and right pleural effusion  - GI consulted - additional workup for cirrhosis sent and currently unremarkable  - Diagnosis/therapeutic paracentesis 10/4 with 3.3L removed. SAAG consistent with protal HTN. No SBP  - pt reports history of fatty liver. Denies alcoholism.    HEIDY  - Cr baseline 0.9, trending up  - likely hepatorenal.  Urine Na < 20. UA negative. S/p albumin and midodrine with no improvement.   - Nephrology consulted. Repeat albumin today.      GOC -- Reviewed cirrhosis and HEIDY with patient/family. GI also discussed prognosis. Renal function continues to worsen. Patient with abd discomfort not improved with PO pain medication. Palliative and Hospice consulted. Continue PRN morphine. Reviewed comfort measures.      Dyspnea and hypoxia   - she was recently admitted at [facility] [place] and diagnosed w CHF, but had echo showing EF 75%; diuretic increased   - CTA chest with no PE, large left pleural effusion and moderate right pleural effusion, atelectasis  - Improvement in resp status following paracentesis.   - Initially on doxy/rocephin -- will DC as suspicion is low for PNA     Possible HFpEF  HTN HLD  - recent admission with echo at [facility] [place]  - Currently holding bumex due to HEIDY  - Hold metoprolol due to hypotension   - continue statin  - daily weights  - strict I&O     Acute onset weakness  - until a couple weeks ago she could walk without a cane or walker  - now using a wheelchair  - PT OT   -  on high protein diet      Lightheadedness  Borderline hypotension  - holding metoprolol and bumex. Continue albumin and midodrine      COPD, emphysema   - no on supplemental oxygen at home  - former smoker, now vapes. Encouraged cessation  - continue with inhalers      Hyponatremia hypervolemic   - new finding.   - cortisol appropriate. TSH      PVD  - incidental finding of atherosclerosis of left subclavian artery, suggesting high-grade stenosis, or possibly occlusion.   - consider vascular input vs outpatient w.u     Generalized weakness  - PT/OT     Anemia  - H/H stable and at baseline     GERD  - PPI     Anxiety/depression  - Celexa, Trazodone, consider holding pending progression of hyponatremia    Expected Discharge Location and Transportation: TBD  Expected Discharge   Expected Discharge Date: 10/9/2023; Expected Discharge Time:      DVT  prophylaxis:  Medical DVT prophylaxis orders are present.     AM-PAC 6 Clicks Score (PT): 12 (10/06/23 0800)    CODE STATUS:   Code Status and Medical Interventions:   Ordered at: 10/05/23 4097     Medical Intervention Limits:    NO intubation (DNI)    NO cardioversion    NO dialysis    NO artificial nutrition     Level Of Support Discussed With:    Patient    Next of Kin (If No Surrogate)     Code Status (Patient has no pulse and is not breathing):    No CPR (Do Not Attempt to Resuscitate)     Medical Interventions (Patient has pulse or is breathing):    Limited Support       Anna William DO  10/06/23

## 2023-10-06 NOTE — PROGRESS NOTES
"   LOS: 3 days    Patient Care Team:  Carmella Barboza DO as PCP - General (Family Medicine)  Provider, No Known as PCP - Family Medicine    Chief Complaint: Shortness of breath, generalized weakness  HPI:  72-year-old admitted with SOB, generalized weakness lower extremity edema, increased abdominal distention, was previously admitted at [place] in September with acute CHF, ejection fraction was 70-75% diastolic dysfunction creatinine after diuresis was 1.3.  Patient is admitted at [facility] now, labs shows  UA negative for protein or blood, serum creatinine 2.03, sodium 127, CO2 17, albumin 2.7, hemoglobin 11.2, urine sodium< 20, cortisol level 24.9.  Admission creatinine was 1.45 and sodium 134.  Significant hypotension is noted.  Patient also had a CT angiogram done on 10/3/2023  Subjective     Interval History:   Sodium slightly improved from 127 up to 129.  Creatinine slightly worse 2.39 with a BUN 30, metabolic acidosis noted CO2 15, urine output not recorded       Review of Systems:   Patient denies shortness of breath, chest pain, dysuria, hematuria, nausea, vomiting.      Objective     Vital Sign Min/Max for last 24 hours  Temp  Min: 97.4 øF (36.3 øC)  Max: 98.1 øF (36.7 øC)   BP  Min: 109/62  Max: 116/66   Pulse  Min: 82  Max: 113   Resp  Min: 16  Max: 18   SpO2  Min: 92 %  Max: 95 %   Flow (L/min)  Min: 2  Max: 2   Weight  Min: 82.5 kg (181 lb 12.8 oz)  Max: 82.5 kg (181 lb 12.8 oz)     Flowsheet Rows      Flowsheet Row First Filed Value   Admission Height 165.1 cm (65\") Documented at 10/03/2023 1715   Admission Weight 77.1 kg (170 lb) Documented at 10/03/2023 1715            I/O this shift:  In: 50 [P.O.:50]  Out: -   I/O last 3 completed shifts:  In: 360 [P.O.:60; IV Piggyback:300]  Out: 215 [Urine:215]    Physical Exam:  General Appearance: Alert, oriented, no obvious distress.  Sick looking  female morbidly obese  Eyes: PER, EOMI.  Neck: Supple no JVD.  Lungs: Clear auscultation, no " rales rhonchi's, equal chest movement, nonlabored.  Heart: No gallop, murmur, rub, RRR.  Abdomen: Soft, nontender, positive bowel sounds, no organomegaly.  Extremities: Positive bilateral lower extremity edema, no cyanosis.  Neuro: No focal deficit, moving all extremities, alert oriented X 3   no suprapubic fullness or tenderness  Skin: Warm and dry positive ecchymosis noted    WBC WBC   Date Value Ref Range Status   10/06/2023 9.40 3.40 - 10.80 10*3/mm3 Final   10/05/2023 9.13 3.40 - 10.80 10*3/mm3 Final   10/04/2023 9.18 3.40 - 10.80 10*3/mm3 Final   10/03/2023 10.93 (H) 3.40 - 10.80 10*3/mm3 Final      HGB Hemoglobin   Date Value Ref Range Status   10/06/2023 10.6 (L) 12.0 - 15.9 g/dL Final   10/05/2023 11.2 (L) 12.0 - 15.9 g/dL Final   10/04/2023 11.7 (L) 12.0 - 15.9 g/dL Final   10/03/2023 14.6 12.0 - 15.9 g/dL Final      HCT Hematocrit   Date Value Ref Range Status   10/06/2023 31.3 (L) 34.0 - 46.6 % Final   10/05/2023 32.7 (L) 34.0 - 46.6 % Final   10/04/2023 34.3 34.0 - 46.6 % Final   10/03/2023 42.2 34.0 - 46.6 % Final      Platlets No results found for: LABPLAT   MCV MCV   Date Value Ref Range Status   10/06/2023 94.3 79.0 - 97.0 fL Final   10/05/2023 95.3 79.0 - 97.0 fL Final   10/04/2023 94.5 79.0 - 97.0 fL Final   10/03/2023 94.4 79.0 - 97.0 fL Final          Sodium Sodium   Date Value Ref Range Status   10/06/2023 129 (L) 136 - 145 mmol/L Final   10/05/2023 127 (L) 136 - 145 mmol/L Final   10/04/2023 127 (L) 136 - 145 mmol/L Final   10/03/2023 128 (L) 136 - 145 mmol/L Final      Potassium Potassium   Date Value Ref Range Status   10/06/2023 4.5 3.5 - 5.2 mmol/L Final   10/05/2023 4.6 3.5 - 5.2 mmol/L Final   10/04/2023 4.5 3.5 - 5.2 mmol/L Final   10/03/2023 5.0 3.5 - 5.2 mmol/L Final     Comment:     Slight hemolysis detected by analyzer. Results may be affected.      Chloride Chloride   Date Value Ref Range Status   10/06/2023 95 (L) 98 - 107 mmol/L Final   10/05/2023 95 (L) 98 - 107 mmol/L Final    10/04/2023 96 (L) 98 - 107 mmol/L Final   10/03/2023 96 (L) 98 - 107 mmol/L Final      CO2 CO2   Date Value Ref Range Status   10/06/2023 15.0 (L) 22.0 - 29.0 mmol/L Final   10/05/2023 17.0 (L) 22.0 - 29.0 mmol/L Final   10/04/2023 16.0 (L) 22.0 - 29.0 mmol/L Final   10/03/2023 19.0 (L) 22.0 - 29.0 mmol/L Final      BUN BUN   Date Value Ref Range Status   10/06/2023 30 (H) 8 - 23 mg/dL Final   10/05/2023 24 (H) 8 - 23 mg/dL Final   10/04/2023 20 8 - 23 mg/dL Final   10/03/2023 21 8 - 23 mg/dL Final      Creatinine Creatinine   Date Value Ref Range Status   10/06/2023 2.39 (H) 0.57 - 1.00 mg/dL Final   10/05/2023 2.03 (H) 0.57 - 1.00 mg/dL Final   10/04/2023 1.59 (H) 0.57 - 1.00 mg/dL Final   10/03/2023 1.41 (H) 0.57 - 1.00 mg/dL Final      Calcium Calcium   Date Value Ref Range Status   10/06/2023 10.1 8.6 - 10.5 mg/dL Final   10/05/2023 9.1 8.6 - 10.5 mg/dL Final   10/04/2023 8.7 8.6 - 10.5 mg/dL Final   10/03/2023 9.1 8.6 - 10.5 mg/dL Final      PO4 No results found for: CAPO4   Albumin Albumin   Date Value Ref Range Status   10/06/2023 3.6 3.5 - 5.2 g/dL Final   10/05/2023 2.7 (L) 3.5 - 5.2 g/dL Final   10/04/2023 2.3 (L) 3.5 - 5.2 g/dL Final   10/03/2023 2.3 (L) 3.5 - 5.2 g/dL Final      Magnesium Magnesium   Date Value Ref Range Status   10/03/2023 1.6 1.6 - 2.4 mg/dL Final   10/03/2023 2.0 1.6 - 2.4 mg/dL Final      Uric Acid No results found for: URICACID        Results Review:     I reviewed the patient's new clinical results.    albumin human, 25 g, Intravenous, Once  aspirin, 81 mg, Oral, Daily  atorvastatin, 40 mg, Oral, Nightly  budesonide-formoterol, 2 puff, Inhalation, BID - RT   And  tiotropium bromide monohydrate, 2 puff, Inhalation, Daily - RT  citalopram, 20 mg, Oral, Daily  heparin (porcine), 5,000 Units, Subcutaneous, Q12H  metoprolol tartrate, 12.5 mg, Oral, BID With Meals  midodrine, 10 mg, Oral, TID AC  pantoprazole, 40 mg, Oral, Daily  rifAXIMin, 550 mg, Oral, Q12H  roflumilast, 250 mcg,  Oral, Daily  saccharomyces boulardii, 250 mg, Oral, BID  senna-docusate sodium, 2 tablet, Oral, BID  sodium chloride, 10 mL, Intravenous, Q12H  traZODone, 150 mg, Oral, Nightly           Medication Review: Reviewed    Assessment & Plan       Acute respiratory failure with hypoxia    Atherosclerosis of native arteries of extremities with intermittent claudication, other extremity    GERD (gastroesophageal reflux disease)    Hyperlipidemia    Hypertension    Chronic obstructive pulmonary disease    CHF (congestive heart failure)    Anemia    Anxiety and depression    Chronic back pain    HEIDY (acute kidney injury)    Hyponatremia    Cirrhosis of liver    Generalized weakness    Cellulitis of right lower extremity    Severe malnutrition    1.  HEIDY: Likely hepatorenal syndrome, hypotensive at time of admission.  Today creatinine 2.03, admission creatinine 1.45, 2 months ago creatinine 0.89 was close to normal.  UA did not show any proteinuria.  Urine sodium less than 20.  Likely prerenal state.  Or ATN secondary to hypotension, IV contrast for CT angio, s/p paracentesis, ultrasound right kidney normal size without any hydronephrosis, left kidney was not done.  2.  Hyponatremia: Likely secondary to prerenal state secondary to hepatorenal syndrome  3.  Mild metabolic acidosis.  4.  Hypoalbuminemia: Liver disease  5.  Anemia  6.  Liver cirrhosis s/p paracentesis 3.3 L on 10/5/2023  7.  Right pleural effusion     Recommendations:  Creatinine slightly worse 2.39 with a BUN 30, metabolic acidosis noted CO2 15, urine output not recorded. Sodium slightly improved from 127 up to 129.  Bladder scan showed more than 400 mL urine.  We will place a Vance catheter   Avoid nephrotoxic medications.  IV albumin ordered.  Keep MAP greater than 65  Check volume status.  Check labs in the morning.  Daily evaluation for renal replacement therapy will be done.  Adjust medication for the new GFR.  Case discussed with the medical staff taking  care of the patient.  Pending SURINDER comprehensive panel, anti-smooth muscle antibody, hepatitis panel, alpha 1 antitrypsin, mitochondrial antibody,  High risk complex patient with multiple medical problems.          Hasn Blakely MD  10/06/23  15:22 EDT

## 2023-10-06 NOTE — PROGRESS NOTES
Palliative Care Progress Note    Laura Hui  [id number]  1950    Date of Admission: 10/3/2023    Subjective:  Renal function worse today.  Pt still having dyspnea.  C/O pain in her abdomen today.  Tender LUQ with palpation.  Hydrocodone not effective for managing her pain at present.     No current facility-administered medications on file prior to encounter.     Current Outpatient Medications on File Prior to Encounter   Medication Sig Dispense Refill    albuterol (PROVENTIL) (2.5 MG/3ML) 0.083% nebulizer solution Take 2.5 mg by nebulization Every 4 (Four) Hours As Needed for Wheezing.      albuterol sulfate  (90 Base) MCG/ACT inhaler Inhale 2 puffs Every 4 (Four) Hours As Needed for Wheezing. 8 g 3    aspirin 81 MG chewable tablet Chew 1 tablet Daily.      atorvastatin (Lipitor) 40 MG tablet Take 1 tablet by mouth Every Night. 90 tablet 1    citalopram (CeleXA) 20 MG tablet TAKE ONE TABLET BY MOUTH DAILY 90 tablet 1    Daliresp 250 MCG tablet tablet TAKE ONE TABLET BY MOUTH DAILY 56 tablet 0    KLOR-CON 20 MEQ CR tablet TAKE ONE TABLET BY MOUTH DAILY 90 tablet 1    metoprolol tartrate (LOPRESSOR) 50 MG tablet Take 1 tablet by mouth 2 (Two) Times a Day With Meals. 180 tablet 1    pantoprazole (Protonix) 40 MG EC tablet Take 1 tablet by mouth Daily. 90 tablet 1    traZODone (DESYREL) 100 MG tablet TAKE TWO TABLETS BY MOUTH ONCE NIGHTLY 180 tablet 1    Trelegy Ellipta 100-62.5-25 MCG/ACT inhaler Inhale 1 puff Daily.      bumetanide (BUMEX) 1 MG tablet Take 1 tablet by mouth 2 (Two) Times a Day.      fluticasone (FLONASE) 50 MCG/ACT nasal spray SPRAY TWO SPRAYS IN EACH NOSTRIL ONCE DAILY 16 mL 2    Ped Multivitamins-Fl-Iron (MULTIVITAMIN WITH FLUORIDE/IRON) 0.25-10 MG/ML solution solution Take  by mouth Daily.            acetaminophen    senna-docusate sodium **AND** polyethylene glycol **AND** bisacodyl **AND** bisacodyl    Calcium Replacement - Follow Nurse / BPA Driven Protocol     "HYDROcodone-acetaminophen    hydrOXYzine    Magnesium Standard Dose Replacement - Follow Nurse / BPA Driven Protocol    melatonin    Morphine    Phosphorus Replacement - Follow Nurse / BPA Driven Protocol    Potassium Replacement - Follow Nurse / BPA Driven Protocol    sodium chloride    sodium chloride    Objective: /64 (BP Location: Right arm, Patient Position: Lying)   Pulse 82   Temp 97.8 øF (36.6 øC) (Oral)   Resp 17   Ht 165.1 cm (65\")   Wt 82.5 kg (181 lb 12.8 oz)   SpO2 93%   BMI 30.25 kg/mý      Intake/Output Summary (Last 24 hours) at 10/6/2023 1716  Last data filed at 10/6/2023 0800  Gross per 24 hour   Intake 50 ml   Output --   Net 50 ml     Physical Exam:      General Appearance:    Alert, cooperative, in no acute distress   Head:    Normocephalic, without obvious abnormality, atraumatic   Eyes:            Lids and lashes normal, conjunctivae and sclerae normal, no   icterus, no pallor, corneas clear, PERRLA   Ears:    Ears appear intact with no abnormalities noted   Throat:   No oral lesions, no thrush, oral mucosa moist   Neck:   No adenopathy, supple, trachea midline, no thyromegaly, no     carotid bruit, no JVD   Back:     No kyphosis present, no scoliosis present, no skin lesions,       erythema or scars, no tenderness to percussion or                   palpation,   range of motion normal   Lungs:     Clear to auscultation,respirations regular, even and                   unlabored    Heart:    Regular rhythm and normal rate, normal S1 and S2, no            murmur, no gallop, no rub, no click   Breast Exam:    Deferred   Abdomen:     Normal bowel sounds, no masses, no organomegaly, soft        non-tender, non-distended, no guarding, no rebound                 tenderness   Genitalia:    Deferred   Extremities:   Moves all extremities well, no edema, no cyanosis, no              redness   Pulses:   Pulses palpable and equal bilaterally   Skin:   No bleeding, bruising or rash   Lymph " nodes:   No palpable adenopathy   Neurologic:   Cranial nerves 2 - 12 grossly intact, sensation intact, DTR        present and equal bilaterally     Results from last 7 days   Lab Units 10/06/23  0346   WBC 10*3/mm3 9.40   HEMOGLOBIN g/dL 10.6*   HEMATOCRIT % 31.3*   PLATELETS 10*3/mm3 140     Results from last 7 days   Lab Units 10/06/23  0346   SODIUM mmol/L 129*   POTASSIUM mmol/L 4.5   CHLORIDE mmol/L 95*   CO2 mmol/L 15.0*   BUN mg/dL 30*   CREATININE mg/dL 2.39*   CALCIUM mg/dL 10.1   BILIRUBIN mg/dL 0.9   ALK PHOS U/L 164*   ALT (SGPT) U/L 15   AST (SGOT) U/L 40*   GLUCOSE mg/dL 74       Impression: Progressive decline during this hospitalization.     Plan: Albumin infusion today per Dr William.  Palliative will follow up on Monday.  Pt may be inpatient hospice eligible on Monday, as her symptoms are not currently well managed and she is taking minimal PO intake.      Time:  40 min       Sarahi Romo, SHMUEL  10/06/23  17:16 EDT

## 2023-10-06 NOTE — CONSULTS
Continued Stay Note  [facility]     Patient Name: Laura Hui  MRN: [id number]  Today's Date: 10/6/2023    Admit Date: 10/3/2023    Plan: To be determined   Discharge Plan       Row Name 10/06/23 1928       Plan    Plan To be determined    Plan Comments Visit made to pt, pt's daughter Mary present. Pt and Mary aware of the hospice referral. Teaching done on hospice philosophy, goals of care and services-team visits, medications, supplies, equipment. Mary stated wants to take pt home to live with Mary and family, though has some concerns with pt not being able to get self out of bed. Discussed use of a shaun lift to get pt from the bed to a chair. Mary stated will not be home 24/7 will need to arrange for caregivers to cover the hours Mary is working. Mary stated needs time to think through hospice and discharge plan. Is agreeable for hospice liaison to make a visit tomorrow. Please call [phone] if can be of further assistance.                   Discharge Codes    No documentation.                 Expected Discharge Date and Time       Expected Discharge Date Expected Discharge Time    Oct 9, 2023               Karissa Burden RN

## 2023-10-06 NOTE — DISCHARGE PLACEMENT REQUEST
"Ez Rose MACHO (73 y.o. Female)       Date of Birth   1950    Social Security Number       Address   [address] [place] [address]    Home Phone   [phone]    MRN   [id number]       Pentecostal   Non-Jew    Marital Status   Single                            Admission Date   10/3/23    Admission Type   Emergency    Admitting Provider   Anna William DO    Attending Provider   Anna William DO    Department, Room/Bed   [address], S516/1       Discharge Date       Discharge Disposition       Discharge Destination                                 Attending Provider: Anna William DO    Allergies: Sulfa Antibiotics    Isolation: None   Infection: None   Code Status: No CPR    Ht: 165.1 cm (65\")   Wt: 82.5 kg (181 lb 12.8 oz)    Admission Cmt: None   Principal Problem: Acute respiratory failure with hypoxia [J96.01]                   Active Insurance as of 10/3/2023       Primary Coverage       Payor Plan Insurance Group Employer/Plan Group    MEDICARE MEDICARE A & B        Payor Plan Address Payor Plan Phone Number Payor Plan Fax Number Effective Dates    [address] [phone]  2015 - None Entered    [place] [address]         Subscriber Name Subscriber Birth Date Member ID       ROSE HUI 1950 [id number]                     Emergency Contacts        (Rel.) Home Phone Work Phone Mobile Phone    Mary Rodríguez (Daughter) -- -- [phone]              Emergency Contact Information       Name Relation Home Work Mobile    Mary Rodríguez Daughter   [phone]          Insurance Information                  MEDICARE/MEDICARE A & B Phone: [phone]    Subscriber: Rose Hui Subscriber#: [id number]    Group#: -- Precert#: --             History & Physical        Suzan Rice MD at 10/03/23 2236              [facility] Medicine Services  HISTORY AND PHYSICAL    Patient Name: Rose Hui  : " "1950  MRN: [id number]  Primary Care Physician: Carmella Barboza DO  Date of admission: 10/3/2023    Subjective   Subjective     Chief Complaint:  SOB    HPI:  Laura Hui is a 73 y.o. female with a past medical history significant for hypertension, HLD, HFpEF, PVD, COPD, chronic back pain, GERD, anemia, and anxiety/depression. She presents today with complaints of generalized weakness and progressively worsening SOB going on for the past 2-3 days. Dyspnea worse with exertion. She has some bilateral lower extremity edema that has improved from a month ago, but notes increasing abdominal distension. States she feels like her abdomen is \"sitting on her chest\". Today she was largely immobile due to weakness. States her legs keep giving out on her and she required assistance getting off the commode today. Patient and family were concerned and called EMS.   Records reviewed indicate that patient was admitted to [facility] on 9/10/2023 and discharged on 9/11/2023 for acute decompensated congestive heart failure. She was evaluated by cardiology in the hospital and had echo performed which showed EF of 70 to 75% with normal wall motion and grade 1 diastolic dysfunction. Patient had significant diuretic response with bump of creatinine to 1.3. She was also found to have a type II non-STEMI, and troponins were trended to improvement. Hospital course was complicated by right lower extremity cellulitis for which she has completed a course of Cephalexin as of yesterday. Reports significant improvement. Patient volunteers that she was ultimately discharged on a \"stronger water pill\". However feels like this is no longer working. Will obtain medical records.  Currently there are no complaints of fever, cough, congestion, SOB, or chest pain. No abdominal pain or N/V/D. No dysuria or flank. No headache or focal weakness/parathesias. Will admit to inpatient for further evaluation and treatment.      Review " of Systems   Constitutional:  Negative for chills, fatigue and fever.   HENT:  Negative for congestion and trouble swallowing.    Eyes:  Negative for photophobia and visual disturbance.   Respiratory:  Positive for shortness of breath. Negative for cough.    Cardiovascular:  Positive for leg swelling. Negative for chest pain.   Gastrointestinal:  Positive for abdominal distention. Negative for abdominal pain, diarrhea, nausea and vomiting.   Endocrine: Negative for cold intolerance and heat intolerance.   Genitourinary:  Negative for dysuria and flank pain.   Musculoskeletal:  Positive for gait problem. Negative for back pain.   Skin:  Positive for color change. Negative for wound.   Allergic/Immunologic: Positive for immunocompromised state.   Neurological:  Positive for weakness. Negative for dizziness and headaches.   Hematological:  Negative for adenopathy.   Psychiatric/Behavioral:  Negative for agitation and confusion.               Personal History     Past Medical History:   Diagnosis Date    Acid reflux     Anemia     Due to chronic blood loss - Chronic blood loss anemia    Apnea     Arthritis     Backache     CHF (congestive heart failure)     COPD (chronic obstructive pulmonary disease)     Severe    Degeneration of lumbar intervertebral disc     Drug therapy     Finding    Emphysema, unspecified     Esophageal reflux     Family history of malignant neoplasm of breast in first degree relative     Gall stones     GERD (gastroesophageal reflux disease)     H/O spinal fusion     H/O: drug dependency     Hearing loss     Heart disease     Hypertrophic cardiomyopathy     Insomnia     Mixed hyperlipidemia     Neurotic Depression     Recurrent major depressive episodes, moderate     Severe obesity     Tobacco use     History of             Past Surgical History:   Procedure Laterality Date    APPENDECTOMY      BACK SURGERY      BACK SURGERY      CARDIAC ABLATION      CARDIAC SURGERY      Cardiac Stent  Catherization    CHOLECYSTECTOMY      Gall Bladder Surgery    HERNIA REPAIR      HIP SURGERY Left     LAPAROSCOPIC TUBAL LIGATION      NECK SURGERY      NECK SURGERY      ORTHOPEDIC SURGERY         Family History: family history includes Arthritis in her maternal grandmother; Breast cancer in her mother; Cancer in an other family member; Heart attack in her maternal grandmother; Multiple sclerosis in her mother; Thyroid disease in her mother.     Social History:  reports that she quit smoking about 15 years ago. Her smoking use included cigarettes. She has never used smokeless tobacco. She reports that she does not drink alcohol and does not use drugs.  Social History     Social History Narrative    Not on file       Medications:  Fluticasone-Umeclidin-Vilant, albuterol, albuterol sulfate HFA, aspirin, atorvastatin, bumetanide, citalopram, fluticasone, metoprolol tartrate, multivitamin with fluoride/iron, pantoprazole, potassium chloride, roflumilast, and traZODone    Allergies   Allergen Reactions    Sulfa Antibiotics Rash       Objective   Objective     Vital Signs:   Temp:  [97.8 øF (36.6 øC)-97.9 øF (36.6 øC)] 97.8 øF (36.6 øC)  Heart Rate:  [76-86] 82  Resp:  [14-19] 19  BP: ()/() 97/51  Flow (L/min):  [2-3] 3    Physical Exam   Constitutional: Awake, alert  Eyes: PERRLA, sclerae anicteric, no conjunctival injection  HENT: NCAT, mucous membranes moist  Neck: Supple, no thyromegaly, no lymphadenopathy, trachea midline  Respiratory: Clear to auscultation bilaterally, nonlabored respirations   Cardiovascular: RRR, no murmurs, rubs, or gallops, palpable pedal pulses bilaterally  Gastrointestinal: Positive bowel sounds, soft, nontender, distended, ascites  Musculoskeletal: trace, pitting, BLE edema no clubbing or cyanosis to extremities  Erythema to anterior aspect of right leg, non tender. improving  Psychiatric: Appropriate affect, cooperative  Neurologic: Oriented x 3, strength symmetric in all  extremities, Cranial Nerves grossly intact to confrontation, speech clear  Skin: No rashes      Result Review:  I have personally reviewed the results from the time of this admission to 10/3/2023 22:37 EDT and agree with these findings:  [x]  Laboratory list / accordion  []  Microbiology  []  Radiology  []  EKG/Telemetry   []  Cardiology/Vascular   []  Pathology  [x]  Old records  []  Other:  Most notable findings include: hypotensive 97/51. Hypoxic to 88% on RA. Sodium 128. CO 19. Creatinine 1.41. alk phos 262. AST 71. Procal 0.97. WBC 10.93. CT chest bilateral effusions.    LAB RESULTS:      Lab 10/03/23  1804 10/03/23  1720   WBC  --  10.93*   HEMOGLOBIN  --  14.6   HEMATOCRIT  --  42.2   PLATELETS  --  213   NEUTROS ABS  --  9.28*   IMMATURE GRANS (ABS)  --  0.05   LYMPHS ABS  --  0.77   MONOS ABS  --  0.80   EOS ABS  --  0.02   MCV  --  94.4   PROCALCITONIN  --  0.97*   LACTATE 1.9  --    PROTIME  --  15.2*   D DIMER QUANT  --  2.84*         Lab 10/03/23  1720   SODIUM 128*   POTASSIUM 5.0   CHLORIDE 96*   CO2 19.0*   ANION GAP 13.0   BUN 21   CREATININE 1.41*   EGFR 39.5*   GLUCOSE 91   CALCIUM 9.1   MAGNESIUM 2.0   TSH 4.920*         Lab 10/03/23  1720   TOTAL PROTEIN 5.5*   ALBUMIN 2.3*   GLOBULIN 3.2   ALT (SGPT) 17   AST (SGOT) 71*   BILIRUBIN 1.1   ALK PHOS 262*         Lab 10/03/23  1950/23  1720   PROBNP  --  10,567.0*   HSTROP T 74* 80*   PROTIME  --  15.2*   INR  --  1.19*                 Brief Urine Lab Results  (Last result in the past 365 days)        Color   Clarity   Blood   Leuk Est   Nitrite   Protein   CREAT   Urine HCG        10/03/23 1839 Dark Yellow   Clear   Negative   Negative   Negative   Negative                 Microbiology Results (last 10 days)       Procedure Component Value - Date/Time    Respiratory Panel PCR w/COVID-19(SARS-CoV-2) JEN/BETSY/ELIEZER/PAD/COR/MAD/URSULA In-House, NP Swab in UT/VTM, 3-4 HR TAT - Swab, Nasopharynx [[id number]]  (Normal) Collected: 10/03/23 1726    Lab  Status: Final result Specimen: Swab from Nasopharynx Updated: 10/03/23 1825     ADENOVIRUS, PCR Not Detected     Coronavirus 229E Not Detected     Coronavirus HKU1 Not Detected     Coronavirus NL63 Not Detected     Coronavirus OC43 Not Detected     COVID19 Not Detected     Human Metapneumovirus Not Detected     Human Rhinovirus/Enterovirus Not Detected     Influenza A PCR Not Detected     Influenza B PCR Not Detected     Parainfluenza Virus 1 Not Detected     Parainfluenza Virus 2 Not Detected     Parainfluenza Virus 3 Not Detected     Parainfluenza Virus 4 Not Detected     RSV, PCR Not Detected     Bordetella pertussis pcr Not Detected     Bordetella parapertussis PCR Not Detected     Chlamydophila pneumoniae PCR Not Detected     Mycoplasma pneumo by PCR Not Detected    Narrative:      In the setting of a positive respiratory panel with a viral infection PLUS a negative procalcitonin without other underlying concern for bacterial infection, consider observing off antibiotics or discontinuation of antibiotics and continue supportive care. If the respiratory panel is positive for atypical bacterial infection (Bordetella pertussis, Chlamydophila pneumoniae, or Mycoplasma pneumoniae), consider antibiotic de-escalation to target atypical bacterial infection.            CT Head Without Contrast    Result Date: 10/3/2023  CT HEAD WO CONTRAST Date of Exam: 10/3/2023 7:07 PM EDT Indication: gen weakness. Comparison: None available. Technique: Axial CT images were obtained of the head without contrast administration.  Automated exposure control and iterative construction methods were used. FINDINGS: There is no evidence for acute intracranial hemorrhage. No definitive acute focal ischemia is identified. Nonspecific white matter changes are noted likely related to chronic small vessel ischemic changes and age-related changes. Associated diffuse volume loss is observed. There is no evidence for abnormal cerebral edema. There  is no mass effect or midline shift. The ventricular system is nondilated. The basal cisterns are patent. The skull is intact. The paranasal sinuses and mastoid air cells are  clear.     Impression: 1.No evidence for acute intracranial abnormality. 2.Nonspecific white matter changes are noted with associated diffuse volume loss. These findings are likely related to chronic small vessel ischemic changes and/or age-related changes. Electronically Signed: Peter Don MD  10/3/2023 7:13 PM EDT  Workstation ID: [id number]    XR Chest 1 View    Result Date: 10/3/2023  XR CHEST 1 VW Date of Exam: 10/3/2023 4:12 PM CDT Indication: gen weakness Comparison: 7/19/2023 Findings: Cardiomediastinal silhouette is unremarkable. There is left mid to lower lung airspace disease suggestive of pneumonia. Small to moderate left and small right effusions. No acute osseous abnormality identified.     Impression: Impression: 1.Left mid to lower lung airspace disease with small to moderate effusion suggestive of pneumonia. Correlate with symptoms. 2.Persistent small right effusion. Electronically Signed: Isaac Smalls MD  10/3/2023 4:28 PM CDT  Workstation ID: [id number]    CT Angiogram Chest    Result Date: 10/3/2023  CT ANGIOGRAM CHEST Date of Exam: 10/3/2023 7:07 PM EDT Indication: SOB, hypoxic, elevated dimer. Comparison: None available. Technique: CTA of the chest was performed after the uneventful intravenous administration of 85 mL Isovue 370. Reconstructed coronal and sagittal images were also obtained. In addition, a 3-D volume rendered image was created for interpretation. Automated exposure control and iterative reconstruction methods were used. Findings: There is generally good contrast opacification of the pulmonary arteries and no evidence of embolic disease. No evidence of thoracic aortic aneurysm or dissection is seen. There is probably high-grade origin stenosis of the left subclavian artery, with very dense calcification of  the lumen on image 26 series 4. No pericardial effusion or mediastinal adenopathy is seen. There is diffuse coronary artery calcium. Multiple macrocalcifications are seen in the normal sized thyroid There is a large free-flowing left pleural effusion and moderate free-flowing right pleural effusion. There is nearly complete atelectasis of the left lower lobe and milder right lower lobe atelectasis. Mild micro bullous change is seen of the upper lobes. No other evidence of active pulmonary parenchymal disease is appreciated. There is a calcified right middle lobe granuloma. The airways appear to be normally patent. Included images of the upper abdomen show extensive ascites. Liver morphology suggests cirrhosis, with marked enlargement of the left liver lobe and small right liver lobe. There appear to be clips in the gallbladder fossa. Spleen is normal in size. Bony structures appear to be intact.     Impression: Impression: 1. No evidence of pulmonary embolic disease. 2. Large free-flowing left pleural effusion and moderate right effusion. 3. Extensive atelectasis of the left lower lobe due to effusion and mild right lower lobe atelectasis. 4. Liver morphology consistent with cirrhosis. Upper abdominal ascites, on these limited images, appears to be extensive. 5. Incidentally noted heavy calcification of the left subclavian artery origin, suggesting high-grade stenosis, or possibly occlusion. Electronically Signed: Brian Sutherland MD  10/3/2023 7:29 PM EDT  Workstation ID: [id number]         Assessment & Plan   Assessment & Plan       Acute respiratory failure with hypoxia    Chronic obstructive pulmonary disease    CHF (congestive heart failure)    HEIDY (acute kidney injury)    Hyponatremia    Cirrhosis of liver    Cellulitis of right lower extremity    Atherosclerosis of native arteries of extremities with intermittent claudication, other extremity    Hypertension    Generalized weakness    GERD (gastroesophageal reflux  disease)    Hyperlipidemia    Anemia    Anxiety and depression    Chronic back pain    73 to female here with dyspnea x 3 days and weakness. Probable new diagnosis of cirrhosis with ascites and pleural effusions.      Left > right pleural effusion  Cirrhosis/ascites (CTA chest) - new finding   Hypoalbuminemia , 2.3   - 88% on RA  - CTA chest demonstrates mod left effusion and smaller R effusion.    - Also shows apparent cirrhotic liver with large ascites  - pt reports history of fatty liver. Denies alcoholism.  -  check PT/INR  - obtain US liver  - consult GI input  - consider diagnostic paracentesis    Acute onset weakness  - until a couple weeks ago she could walk without a cane or walker  - now using a wheelchair  - this may be muscle wasting + effect of water weight and ascites + hypotension   - PT OT   -  on high protein diet     Lightheadedness  Borderline hypotn  - check orthostatics in AM   - consider midodrine, or adjust diuretics,     Dyspnea and hypoxia   - [place] [facility] [place], but had echo showing EF 75% and had water pill increased  - likely the cause is pleural effusions from liver dx/low albumin.    - will obtain blood cultures, and empirically started rocephin and doxycyline   - however there is little evidence for infxn; consider rapid wean.       HEIDY  - Cr baseline 0.9, now 1.4  - ? Cardiorenal vs effect of overdiuresis (diuretic recently increased) or ATN   - avoid nephrotoxins  - obtain UA  - bladder scan and straight cath as needed    -   Possible HFpEF (vs ascites and hypoalbuminemia causing pl effusions)   HTN HLD  - [place] [facility] [place] pending  - holding on additional Bumex due to hypotension  - will administer trail of Albumin X1, serum albumin low at 2.3  - continue statin  - plan to resume Bumex 1 mg BID and Lopressor in am pending pressure  - daily weights  - strict I&O    COPD, emphysema   - no on supplemental  oxygen at home  - former smoker, now vapes. Encouraged cessation  - continue with inhalers     Hyponatremia hypervolemic   - new finding.   - check urine sodium, urine osmolality, serum osmolality  - AM cortisol  - monitor chemistry closely    PVD  - incidental finding of atherosclerosis of left subclavian artery, suggesting high-grade stenosis, or possibly occlusion.   - consider vascular input vs outpatient w.u    Generalized weakness  - PT/OT    Anemia  - H/H stable and at baseline    GERD  - PPI    Anxiety/depression  - Celexa, Trazodone, consider holding pending progression of hyponatremia    DVT prophylaxis: MARIE    CODE STATUS:  full code  Level Of Support Discussed With: Patient  Code Status (Patient has no pulse and is not breathing): CPR (Attempt to Resuscitate)  Medical Interventions (Patient has pulse or is breathing): Full Support      Expected Discharge  TBD      This note has been completed as part of a split-shared workflow.     Signature: Electronically signed by Diaz Michelle PA-C, 10/03/23, 10:55 PM EDT    Total time spent: 90 minutes  Time spent includes time reviewing chart, face-to-face time, counseling patient/family/caregiver, ordering medications/tests/procedures, communicating with other health care professionals, documenting clinical information in the electronic health record, and coordination of care.      Attending   Admission Attestation       I have performed an independent face-to-face diagnostic evaluation including performing an independent physical examination.  The documented plan of care above was reviewed and developed with the advanced practice clinician (APC).  I have updated the HPI as appropriate.    Brief HPI    72 yo with PMH  of  HTN HL HFpEF, PVD, COPD, chronic back pain, GERD, anemia, and anxiety/depression.  A month ago she was at [facility] [place], was diagnosed with CHF and diuresed.  However, recently she has developed dyspnea and abdominal swelling and  lightheadedness with standing.  She used to walk without a walker or cane - now she is too weak to rise to her feet.  Abdomen is swelling.  Her legs have been giving out .  She called EMS to bring her to ED.          Attending Physical Exam:    Gen: woman lying in bed, NAD, looks chronically ill but a good historian, very pleasant   Neuro: alert and oriented, clear speech, follows commands, grossly nonfocal, remembers dates and details of recent medical care   HEENT:  NC/AT PER  Neck:  Supple, no LAD  Heart RRR  Abd:  Mod distended, soft, not tympanic and not tender  Extrem:  No c/c 1+ edema            Assessment and Plan:    See assessment and plan documented by APC above and updated/edited by me as appropriate.    Suzan Rice MD  10/03/23                        Electronically signed by Suzan Rice MD at 10/04/23 0036       Facility-Administered Medications as of 10/6/2023   Medication Dose Route Frequency Provider Last Rate Last Admin    acetaminophen (TYLENOL) tablet 650 mg  650 mg Oral Q4H PRN Diaz Michelle PA-C        [COMPLETED] albumin human 25 % IV SOLN 25 g  25 g Intravenous Once Diaz Michelle PA-C   25 g at 10/03/23 2245    [COMPLETED] albumin human 25 % IV SOLN 25 g  25 g Intravenous Once Anna William DO   25 g at 10/04/23 1515    [COMPLETED] albumin human 25 % IV SOLN 25 g  25 g Intravenous Once Anna William DO   25 g at 10/05/23 0847    [COMPLETED] albumin human 25 % IV SOLN 25 g  25 g Intravenous Q6H Hans Blakely MD   25 g at 10/06/23 0414    aspirin chewable tablet 81 mg  81 mg Oral Daily Diaz Michelle PA-C   81 mg at 10/06/23 0858    atorvastatin (LIPITOR) tablet 40 mg  40 mg Oral Nightly Diaz Michelle PA-C   40 mg at 10/05/23 2151    sennosides-docusate (PERICOLACE) 8.6-50 MG per tablet 2 tablet  2 tablet Oral BID Diaz Michelle PA-C   2 tablet at 10/05/23 2151    And    polyethylene glycol (MIRALAX) packet 17 g  17 g Oral Daily PRN Diaz Michelle PA-C         And    bisacodyl (DULCOLAX) EC tablet 5 mg  5 mg Oral Daily PRN Diaz Michelle PA-C        And    bisacodyl (DULCOLAX) suppository 10 mg  10 mg Rectal Daily PRN Diaz Michelle PA-C        budesonide-formoterol (SYMBICORT) 160-4.5 MCG/ACT inhaler 2 puff  2 puff Inhalation BID - RT Diaz Michelle PA-C   2 puff at 10/06/23 0847    And    tiotropium (SPIRIVA RESPIMAT) 2.5 mcg/act aerosol solution inhaler  2 puff Inhalation Daily - RT Diaz Michelle PA-C   2 puff at 10/06/23 0847    Calcium Replacement - Follow Nurse / BPA Driven Protocol   Does not apply PRN Diaz Michelle PA-C        cefTRIAXone (ROCEPHIN) 2000 mg/100 mL 0.9% NS IVPB (MBP)  2,000 mg Intravenous Q24H Diaz Michelle PA-C   2,000 mg at 10/06/23 0051    citalopram (CeleXA) tablet 20 mg  20 mg Oral Daily Diaz Michelle PA-C   20 mg at 10/06/23 0858    [COMPLETED] doxycycline (VIBRAMYCIN) 100 mg in sodium chloride 0.9 % 100 mL IVPB-VTB  100 mg Intravenous Once Sebastian Whelan MD   100 mg at 10/03/23 1844    doxycycline (VIBRAMYCIN) 100 mg in sodium chloride 0.9 % 100 mL IVPB-VTB  100 mg Intravenous Q12H Diaz Michelle PA-C   100 mg at 10/06/23 0619    heparin (porcine) 5000 UNIT/ML injection 5,000 Units  5,000 Units Subcutaneous Q12H Diaz Michelle PA-C   5,000 Units at 10/06/23 0857    HYDROcodone-acetaminophen (NORCO) 5-325 MG per tablet 1 tablet  1 tablet Oral Q6H PRN Anna William DO   1 tablet at 10/06/23 1035    hydrOXYzine (ATARAX) tablet 25 mg  25 mg Oral TID PRN Anna William DO   25 mg at 10/05/23 1425    [COMPLETED] iopamidol (ISOVUE-370) 76 % injection 85 mL  85 mL Intravenous Once in imaging Sebastian Whelan MD   85 mL at 10/03/23 1909    [COMPLETED] lidocaine PF 1% (XYLOCAINE) injection 5 mL  5 mL Injection Once Demetrius Izquierdo MD   5 mL at 10/04/23 1634    Magnesium Standard Dose Replacement - Follow Nurse / BPA Driven Protocol   Does not apply Diaz Garcia PA-C         melatonin tablet 5 mg  5 mg Oral Nightly PRN Diaz Michelle PA-C   5 mg at 10/04/23 2031    metoprolol tartrate (LOPRESSOR) half tablet 12.5 mg  12.5 mg Oral BID With Meals Anna William DO   12.5 mg at 10/06/23 0858    midodrine (PROAMATINE) tablet 10 mg  10 mg Oral TID AC Anna William DO   10 mg at 10/06/23 0858    morphine injection 1 mg  1 mg Intravenous Q4H PRN Anna William DO   1 mg at 10/05/23 1425    pantoprazole (PROTONIX) EC tablet 40 mg  40 mg Oral Daily Diaz Michelle PA-C   40 mg at 10/06/23 0858    Phosphorus Replacement - Follow Nurse / BPA Driven Protocol   Does not apply PRN Diaz Michelle PA-C        Potassium Replacement - Follow Nurse / BPA Driven Protocol   Does not apply PRN Diaz Michelle PA-ROB        riFAXIMin (XIFAXAN) tablet 550 mg  550 mg Oral Q12H Brunner, Mark I, MD   550 mg at 10/06/23 0619    roflumilast (DALIRESP) tablet 250 mcg  250 mcg Oral Daily Diaz Michelle PA-C   250 mcg at 10/06/23 0858    saccharomyces boulardii (FLORASTOR) capsule 250 mg  250 mg Oral BID Diaz Michelle PA-C   250 mg at 10/06/23 0859    [COMPLETED] sodium chloride 0.9 % bolus 500 mL  500 mL Intravenous Once Sebastian Whelan MD 1,000 mL/hr at 10/03/23 1844 500 mL at 10/03/23 1844    sodium chloride 0.9 % flush 10 mL  10 mL Intravenous Q12H Diaz Michelle PA-C   10 mL at 10/05/23 2156    sodium chloride 0.9 % flush 10 mL  10 mL Intravenous PRN Diaz Michelle PA-C        sodium chloride 0.9 % infusion 40 mL  40 mL Intravenous PRN Diaz Michelle PA-C        traZODone (DESYREL) tablet 150 mg  150 mg Oral Nightly Diaz Michelle PA-C   150 mg at 10/05/23 2151        Physician Progress Notes (last 7 days)        Anna William DO at 10/05/23 1444              [facility] Medicine Services  PROGRESS NOTE    Patient Name: aLura Hui  : 1950  MRN: [id number]    Date of Admission: 10/3/2023  Primary Care Physician: Carmella Barboza  A, DO    Subjective   Subjective     CC:  Ascites, abd pain    HPI:  No acute events. Breahting is improved. Continues with abd pain. Some better following para but still an issue. Reviewed renal function. Called and updated daughter.       Objective   Objective     Vital Signs:   Temp:  [97.7 øF (36.5 øC)-98.2 øF (36.8 øC)] 97.9 øF (36.6 øC)  Heart Rate:  [101-114] 106  Resp:  [14-18] 18  BP: ()/(42-54) 99/48  Flow (L/min):  [2] 2     Physical Exam:  Constitutional: acute/chronically ill   HENT: NCAT, mucous membranes moist  Respiratory: diminished; coarse   Cardiovascular: RRR, no murmurs, rubs, or gallops  Gastrointestinal: Positive bowel sounds, soft, distention improved. Diffuse tenderness  Musculoskeletal: trace bilateral ankle edema  Psychiatric: flat affect, cooperative  Neurologic: Oriented x 3, strength symmetric in all extremities, Cranial Nerves grossly intact to confrontation, speech clear  Skin: No rashes      Results Reviewed:  LAB RESULTS:      Lab 10/05/23  0339 10/04/23  0453 10/03/23  1804 10/03/23  1720   WBC 9.13 9.18  --  10.93*   HEMOGLOBIN 11.2* 11.7*  --  14.6   HEMATOCRIT 32.7* 34.3  --  42.2   PLATELETS 127* 143  --  213   NEUTROS ABS  --  7.52*  --  9.28*   IMMATURE GRANS (ABS)  --  0.04  --  0.05   LYMPHS ABS  --  0.69*  --  0.77   MONOS ABS  --  0.88  --  0.80   EOS ABS  --  0.03  --  0.02   MCV 95.3 94.5  --  94.4   PROCALCITONIN  --   --   --  0.97*   LACTATE  --   --  1.9  --    PROTIME  --   --   --  15.2*   D DIMER QUANT  --   --   --  2.84*         Lab 10/05/23  0339 10/04/23  0453 10/03/23  2222 10/03/23  1720   SODIUM 127* 127*  --  128*   POTASSIUM 4.6 4.5  --  5.0   CHLORIDE 95* 96*  --  96*   CO2 17.0* 16.0*  --  19.0*   ANION GAP 15.0 15.0  --  13.0   BUN 24* 20  --  21   CREATININE 2.03* 1.59*  --  1.41*   EGFR 25.5* 34.2*  --  39.5*   GLUCOSE 77 69  --  91   CALCIUM 9.1 8.7  --  9.1   MAGNESIUM  --   --  1.6 2.0   TSH  --   --   --  4.920*         Lab 10/05/23  0339  10/04/23  0453 10/03/23  1720   TOTAL PROTEIN 4.6* 4.7* 5.5*   ALBUMIN 2.7* 2.3* 2.3*   GLOBULIN 1.9 2.4 3.2   ALT (SGPT) 18 19 17   AST (SGOT) 51* 55* 71*   BILIRUBIN 0.8 0.9 1.1   ALK PHOS 180* 194* 262*         Lab 10/03/23  2222 10/03/23  1950/23  1720   PROBNP  --   --  10,567.0*   HSTROP T 77* 74* 80*   PROTIME  --   --  15.2*   INR  --   --  1.19*             Lab 10/04/23  0453   IRON 50   IRON SATURATION (TSAT) 35   TIBC 143*   TRANSFERRIN 96*         Lab 10/03/23  2328   PH, ARTERIAL 7.454*   PCO2, ARTERIAL 25.7*   PO2 ART 82.6*   FIO2 32   HCO3 ART 18.0*   BASE EXCESS ART -4.4*   CARBOXYHEMOGLOBIN 0.9     Brief Urine Lab Results  (Last result in the past 365 days)        Color   Clarity   Blood   Leuk Est   Nitrite   Protein   CREAT   Urine HCG        10/05/23 1322 Yellow   Clear   Negative   Negative   Negative   Negative                   Microbiology Results Abnormal       Procedure Component Value - Date/Time    Blood Culture - Blood, Hand, Left [[id number]]  (Normal) Collected: 10/03/23 1804    Lab Status: Preliminary result Specimen: Blood from Hand, Left Updated: 10/04/23 1815     Blood Culture No growth at 24 hours    Blood Culture - Blood, Arm, Right [[id number]]  (Normal) Collected: 10/03/23 1720    Lab Status: Preliminary result Specimen: Blood from Arm, Right Updated: 10/04/23 1815     Blood Culture No growth at 24 hours    MRSA Screen, PCR (Inpatient) - Swab, Nares [[id number]]  (Normal) Collected: 10/03/23 2327    Lab Status: Final result Specimen: Swab from Nares Updated: 10/04/23 0811     MRSA PCR Negative    Narrative:      The negative predictive value of this diagnostic test is high and should only be used to consider de-escalating anti-MRSA therapy. A positive result may indicate colonization with MRSA and must be correlated clinically.  MRSA Negative    Respiratory Panel PCR w/COVID-19(SARS-CoV-2) JEN/BETSY/ELIEZER/PAD/COR/MAD/URSULA In-House, NP Swab in UTM/VTM, 3-4 HR TAT - Swab,  Nasopharynx [[id number]]  (Normal) Collected: 10/03/23 1726    Lab Status: Final result Specimen: Swab from Nasopharynx Updated: 10/03/23 1825     ADENOVIRUS, PCR Not Detected     Coronavirus 229E Not Detected     Coronavirus HKU1 Not Detected     Coronavirus NL63 Not Detected     Coronavirus OC43 Not Detected     COVID19 Not Detected     Human Metapneumovirus Not Detected     Human Rhinovirus/Enterovirus Not Detected     Influenza A PCR Not Detected     Influenza B PCR Not Detected     Parainfluenza Virus 1 Not Detected     Parainfluenza Virus 2 Not Detected     Parainfluenza Virus 3 Not Detected     Parainfluenza Virus 4 Not Detected     RSV, PCR Not Detected     Bordetella pertussis pcr Not Detected     Bordetella parapertussis PCR Not Detected     Chlamydophila pneumoniae PCR Not Detected     Mycoplasma pneumo by PCR Not Detected    Narrative:      In the setting of a positive respiratory panel with a viral infection PLUS a negative procalcitonin without other underlying concern for bacterial infection, consider observing off antibiotics or discontinuation of antibiotics and continue supportive care. If the respiratory panel is positive for atypical bacterial infection (Bordetella pertussis, Chlamydophila pneumoniae, or Mycoplasma pneumoniae), consider antibiotic de-escalation to target atypical bacterial infection.            CT Head Without Contrast    Result Date: 10/3/2023  CT HEAD WO CONTRAST Date of Exam: 10/3/2023 7:07 PM EDT Indication: gen weakness. Comparison: None available. Technique: Axial CT images were obtained of the head without contrast administration.  Automated exposure control and iterative construction methods were used. FINDINGS: There is no evidence for acute intracranial hemorrhage. No definitive acute focal ischemia is identified. Nonspecific white matter changes are noted likely related to chronic small vessel ischemic changes and age-related changes. Associated diffuse volume loss is  observed. There is no evidence for abnormal cerebral edema. There is no mass effect or midline shift. The ventricular system is nondilated. The basal cisterns are patent. The skull is intact. The paranasal sinuses and mastoid air cells are  clear.     Impression: 1.No evidence for acute intracranial abnormality. 2.Nonspecific white matter changes are noted with associated diffuse volume loss. These findings are likely related to chronic small vessel ischemic changes and/or age-related changes. Electronically Signed: Peter Don MD  10/3/2023 7:13 PM EDT  Workstation ID: [id number]    US Liver    Result Date: 10/4/2023  US LIVER Date of Exam: 10/4/2023 7:00 AM CDT Indication: new cirrhosis. Comparison: No comparisons available. Technique: Grayscale and color Doppler ultrasound evaluation of the right upper quadrant was performed. Findings: LIVER: Nodular contours and heterogeneous parenchymal echotexture consistent with cirrhosis. No focal lesions identified. Normal flow is present within the hepatic vasculature. GALLBLADDER: Gallbladder is surgically absent. The common bile duct measures 2 mm in diameter, normal. PANCREAS:  Visualized portions are unremarkable. AORTA/IVC:  Visualized portions are unremarkable. RIGHT KIDNEY:  Normal in size with no focal lesions. No evidence of nephrolithiasis or hydronephrosis. There is moderate volume ascites. There is partial visualization of a right pleural effusion.     Impression: Impression: 1.Cirrhosis with ascites and right pleural effusion. Electronically Signed: Isaac Smalls MD  10/4/2023 7:34 AM CDT  Workstation ID: [id number]    XR Chest 1 View    Result Date: 10/5/2023  XR CHEST 1 VW Date of Exam: 10/5/2023 3:26 AM CDT Indication: Pleural effusion Comparison: 10/3/2023 Findings: Cardiomediastinal silhouette is unchanged. Persistent pulmonary vascular congestion and interstitial edema. There is central pulmonary edema with small to moderate left and small right pleural  effusions. No new airspace disease nor pneumothorax. No acute  osseous abnormality identified.     Impression: Impression: Moderate CHF/volume overload features are similar to prior exam. Electronically Signed: Isaac Smalls MD  10/5/2023 7:00 AM CDT  Workstation ID: [id number]    XR Chest 1 View    Result Date: 10/3/2023  XR CHEST 1 VW Date of Exam: 10/3/2023 4:12 PM CDT Indication: gen weakness Comparison: 7/19/2023 Findings: Cardiomediastinal silhouette is unremarkable. There is left mid to lower lung airspace disease suggestive of pneumonia. Small to moderate left and small right effusions. No acute osseous abnormality identified.     Impression: Impression: 1.Left mid to lower lung airspace disease with small to moderate effusion suggestive of pneumonia. Correlate with symptoms. 2.Persistent small right effusion. Electronically Signed: Isaac Smalls MD  10/3/2023 4:28 PM CDT  Workstation ID: [id number]    US Paracentesis    Result Date: 10/5/2023  US PARACENTESIS  History: new ascites  : Demetrius Izquierdo MD.  Modality: Ultrasonography                   Sedation: None. Anesthesia: Lidocaine 1% local infiltration. Estimated blood loss:  0 cc.        Technique: A thorough discussion of the risks, benefits, and alternatives of the procedure, and if applicable, moderate sedation, was carried out with the patient. They were encouraged to ask any questions. Any questions were answered. They verbalized understanding. A written informed consent was then signed.  A multi-component timeout was performed prior to starting the procedure using the departmental protocol. The procedure room personnel used personal protective equipment. The operators used sterile gloves and if indicated, sterile gowns. The surgical site was prepped with chlorhexidine gluconate  and draped in the maximal applicable sterile fashion. A preliminary ultrasonography was performed to assess the target and determine a safe access site. It  showed ascites. Pertinent ultrasound images were stored to the PACS for documentation. The access site was sterilely prepped and draped. Local anesthesia was administered. A dermatotomy was performed if needed. A catheter over the needle system was advanced into the peritoneal cavity. Straw colored fluid was aspirated and the plastic catheter advanced into the peritoneum. The catheter was then connected to a fluid recovery system. At the end of the procedure, the catheter was withdrawn and an aseptic dressing applied. The patient tolerated the procedure well. After uneventful recovery recovery, the patient was discharged from the department in stable condition. The total amount of fluid recovered is given below. Albumin was infused intravenously if ordered by the referring doctor. Complications: None immediate. Specimen: The specimen was labeled and sent to the lab in appropriate carriers & containers if such was requested by the ordering provider.     Impression: Impression:                                                              Successful ultrasound-guided paracentesis using a Right lower quadrant access with recovery of 3.3 liters of fluid as described above. Thank you for the opportunity to assist in the care of your patient. Electronically Signed: Demetrius Izquierdo MD  10/5/2023 8:31 AM EDT  Workstation ID: [id number]    CT Angiogram Chest    Result Date: 10/3/2023  CT ANGIOGRAM CHEST Date of Exam: 10/3/2023 7:07 PM EDT Indication: SOB, hypoxic, elevated dimer. Comparison: None available. Technique: CTA of the chest was performed after the uneventful intravenous administration of 85 mL Isovue 370. Reconstructed coronal and sagittal images were also obtained. In addition, a 3-D volume rendered image was created for interpretation. Automated exposure control and iterative reconstruction methods were used. Findings: There is generally good contrast opacification of the pulmonary arteries and no evidence of  embolic disease. No evidence of thoracic aortic aneurysm or dissection is seen. There is probably high-grade origin stenosis of the left subclavian artery, with very dense calcification of the lumen on image 26 series 4. No pericardial effusion or mediastinal adenopathy is seen. There is diffuse coronary artery calcium. Multiple macrocalcifications are seen in the normal sized thyroid There is a large free-flowing left pleural effusion and moderate free-flowing right pleural effusion. There is nearly complete atelectasis of the left lower lobe and milder right lower lobe atelectasis. Mild micro bullous change is seen of the upper lobes. No other evidence of active pulmonary parenchymal disease is appreciated. There is a calcified right middle lobe granuloma. The airways appear to be normally patent. Included images of the upper abdomen show extensive ascites. Liver morphology suggests cirrhosis, with marked enlargement of the left liver lobe and small right liver lobe. There appear to be clips in the gallbladder fossa. Spleen is normal in size. Bony structures appear to be intact.     Impression: Impression: 1. No evidence of pulmonary embolic disease. 2. Large free-flowing left pleural effusion and moderate right effusion. 3. Extensive atelectasis of the left lower lobe due to effusion and mild right lower lobe atelectasis. 4. Liver morphology consistent with cirrhosis. Upper abdominal ascites, on these limited images, appears to be extensive. 5. Incidentally noted heavy calcification of the left subclavian artery origin, suggesting high-grade stenosis, or possibly occlusion. Electronically Signed: Brian Sutherland MD  10/3/2023 7:29 PM EDT  Workstation ID: [id number]         Current medications:  Scheduled Meds:aspirin, 81 mg, Oral, Daily  atorvastatin, 40 mg, Oral, Nightly  budesonide-formoterol, 2 puff, Inhalation, BID - RT   And  tiotropium bromide monohydrate, 2 puff, Inhalation, Daily - RT  cefTRIAXone, 2,000 mg,  Intravenous, Q24H  citalopram, 20 mg, Oral, Daily  doxycycline, 100 mg, Intravenous, Q12H  heparin (porcine), 5,000 Units, Subcutaneous, Q12H  metoprolol tartrate, 12.5 mg, Oral, BID With Meals  midodrine, 10 mg, Oral, TID AC  pantoprazole, 40 mg, Oral, Daily  rifAXIMin, 550 mg, Oral, Q12H  roflumilast, 250 mcg, Oral, Daily  saccharomyces boulardii, 250 mg, Oral, BID  senna-docusate sodium, 2 tablet, Oral, BID  sodium chloride, 10 mL, Intravenous, Q12H  traZODone, 150 mg, Oral, Nightly      Continuous Infusions:   PRN Meds:.  acetaminophen    senna-docusate sodium **AND** polyethylene glycol **AND** bisacodyl **AND** bisacodyl    Calcium Replacement - Follow Nurse / BPA Driven Protocol    HYDROcodone-acetaminophen    hydrOXYzine    Magnesium Standard Dose Replacement - Follow Nurse / BPA Driven Protocol    melatonin    Morphine    Phosphorus Replacement - Follow Nurse / BPA Driven Protocol    Potassium Replacement - Follow Nurse / BPA Driven Protocol    sodium chloride    sodium chloride    Assessment & Plan   Assessment & Plan     Active Hospital Problems    Diagnosis  POA    **Acute respiratory failure with hypoxia [J96.01]  Yes    Severe malnutrition [E43]  Yes    CHF (congestive heart failure) [I50.9]  Yes    Anemia [D64.9]  Yes    Anxiety and depression [F41.9, F32.A]  Yes    Chronic back pain [M54.9, G89.29]  Yes    HEIDY (acute kidney injury) [N17.9]  Yes    Hyponatremia [E87.1]  Yes    Cirrhosis of liver [K74.60]  Yes    Generalized weakness [R53.1]  Yes    Cellulitis of right lower extremity [L03.115]  Yes    GERD (gastroesophageal reflux disease) [K21.9]  Yes    Hypertension [I10]  Yes    Atherosclerosis of native arteries of extremities with intermittent claudication, other extremity [I70.218]  Yes    Chronic obstructive pulmonary disease [J44.9]  Yes    Hyperlipidemia [E78.5]  Yes      Resolved Hospital Problems   No resolved problems to display.        Brief Hospital Course to date:  73 to female here  with dyspnea x 3 days and weakness. Probable new diagnosis of cirrhosis with ascites and pleural effusions.       Left > right pleural effusion  Cirrhosis/ascites (CTA chest) - new finding   Hypoalbuminemia , 2.3   - CTA chest demonstrates mod left effusion and smaller R effusion; cirrhotic liver with large ascites noted  - Liver US with cirrhosis, ascites and right pleural effusion  - GI consulted  - Diagnosis/therapeutic paracentesis 10/4 with 3.3L removed. SAAG consistent with protal HTN. No SBP  - pt reports history of fatty liver. Denies alcoholism.    GOC -- palliative and hospice consulted. Morphine added for pain     Dyspnea and hypoxia   - she was recently admitted at [facility] and diagnosed w CHF, but had echo showing EF 75%; diuretic increased   - CTA chest with no PE, large left pleural effusion and moderate right pleural effusion, atelectasis  - Continue treatment for ? PNA for now. -- can likely de-escalate soon   - Improvement in resp status following paracentesis.      HEIDY  - Cr baseline 0.9, trending up  - likely hepatorenal. Urine Na < 20. UA negative. S/p albumin.  - Holding diuretics. Increase midodrine      Possible HFpEF (vs ascites and hypoalbuminemia causing pl effusions)   HTN HLD  - recent admission with echo at [facility]:  records from OSH pending  - Currently holding bumex due to HEIDY  - Hold metoprolol due to hypotension   - continue statin  - daily weights  - strict I&O     Acute onset weakness  - until a couple weeks ago she could walk without a cane or walker  - now using a wheelchair  - PT OT   -  on high protein diet      Lightheadedness  Borderline hypotn  - holding metoprolol and bumex. Continue albumin and midodrine      COPD, emphysema   - no on supplemental oxygen at home  - former smoker, now vapes. Encouraged cessation  - continue with inhalers      Hyponatremia hypervolemic   - new finding.   - cortisol appropriate. TSH      PVD  - incidental finding of atherosclerosis of left  "subclavian artery, suggesting high-grade stenosis, or possibly occlusion.   - consider vascular input vs outpatient w.u     Generalized weakness  - PT/OT     Anemia  - H/H stable and at baseline     GERD  - PPI     Anxiety/depression  - Celexa, Trazodone, consider holding pending progression of hyponatremia     Expected Discharge Location and Transportation: TBD  Expected Discharge   Expected Discharge Date: 10/6/2023; Expected Discharge Time:      DVT prophylaxis:  Medical DVT prophylaxis orders are present.     AM-PAC 6 Clicks Score (PT): 12 (10/05/23 0815)    CODE STATUS:   Code Status and Medical Interventions:   Ordered at: 10/03/23 2043     Level Of Support Discussed With:    Patient     Code Status (Patient has no pulse and is not breathing):    CPR (Attempt to Resuscitate)     Medical Interventions (Patient has pulse or is breathing):    Full Support       Anna William DO  10/05/23        Electronically signed by Anna William DO at 10/05/23 5262       Brunner, Mark I, MD at 10/05/23 1229          GI Daily Progress Note  Subjective:    Chief Complaint: Follow-up decompensated cirrhosis.    Patient has vague abdominal discomfort with truncal movements.  Less abdominal discomfort since paracentesis yesterday.  Has very little urine output.  Denies nausea.  No signs of GI bleeding.    Objective:    BP 99/48   Pulse 106   Temp 97.9 øF (36.6 øC) (Oral)   Resp 18   Ht 165.1 cm (65\")   Wt 79.6 kg (175 lb 6.4 oz)   SpO2 95%   BMI 29.19 kg/mý     Physical Exam  Constitutional:       General: She is not in acute distress.     Appearance: She is ill-appearing. She is not toxic-appearing or diaphoretic.   HENT:      Head: Normocephalic.      Nose: Nose normal. No congestion.      Mouth/Throat:      Pharynx: No oropharyngeal exudate.   Eyes:      General: No scleral icterus.  Cardiovascular:      Rate and Rhythm: Normal rate.      Pulses: Normal pulses.   Pulmonary:      Effort: Pulmonary effort is normal. " No respiratory distress.   Abdominal:      General: Bowel sounds are normal. There is no distension.      Palpations: Abdomen is soft.      Tenderness: There is no abdominal tenderness. There is no guarding.   Musculoskeletal:      Cervical back: Normal range of motion.      Comments: Trace ankle edema. +Sarcopenia.   Skin:     Findings: Erythema present.      Comments: Mild erythema on lower extremities, no erysipelas.   Neurological:      General: No focal deficit present.      Mental Status: She is alert and oriented to person, place, and time.   Psychiatric:         Mood and Affect: Mood normal.         Behavior: Behavior normal.       Lab  Lab Results   Component Value Date    WBC 9.13 10/05/2023    HGB 11.2 (L) 10/05/2023    HGB 11.7 (L) 10/04/2023    HGB 14.6 10/03/2023    MCV 95.3 10/05/2023     (L) 10/05/2023    INR 1.19 (H) 10/03/2023    INR 1.1 03/07/2023       Lab Results   Component Value Date    GLUCOSE 77 10/05/2023    BUN 24 (H) 10/05/2023    CREATININE 2.03 (H) 10/05/2023    EGFRIFNONA 64 03/24/2020    BCR 11.8 10/05/2023     (L) 10/05/2023    K 4.6 10/05/2023    CO2 17.0 (L) 10/05/2023    CALCIUM 9.1 10/05/2023    PROTENTOTREF 5.5 (L) 09/25/2023    ALBUMIN 2.7 (L) 10/05/2023    ALKPHOS 180 (H) 10/05/2023    BILITOT 0.8 10/05/2023    ALT 18 10/05/2023    AST 51 (H) 10/05/2023      Latest Reference Range & Units 10/04/23 17:54   Hep B S Ab Non-Reactive  Non-Reactive      Latest Reference Range & Units 10/03/23 18:00   Sodium, Urine mmol/L <20     Assessment:    1.  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 Does not have immunity to Hep B.  2.  Ascites.  Status post 3.3 L paracentesis yesterday.  No evidence for SBP.  3.  Large left pleural effusion.  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  4.  Mild hepatic encephalopathy.  Patient is generally aware of when she is confused.  She has found herself forgetting things, and placing toilet paper rolls in the refrigerator.  5.  Acute kidney injury.  Probable  hepatorenal syndrome.  6.  Severe deconditioning.  7.  Sarcopenia.    Plan:    >> Hepatitis B vaccine #1 today.  >> Awaiting hepatitis A serology to assess immunity.   >> Begin Xifaxan 550 mg twice daily.  >> Awaiting autoimmune markers.  >> Awaiting vitamin D level.  >> Agree with Nephrology consult.  >> Patient has advanced liver disease, multiple comorbidities, and poor functional status. Would consider goals of care discussion/hospice, especially is renal function does not recover.        Mark I. Brunner, MD  10/05/23  12:29 EDT      Electronically signed by Brunner, Mark I, MD at 10/05/23 1323       Anna William DO at 10/04/23 1316              [facility] Medicine Services  PROGRESS NOTE    Patient Name: Laura Hui  : 1950  MRN: [id number]    Date of Admission: 10/3/2023  Primary Care Physician: Carmella Barboza DO    Subjective   Subjective     CC:  Ascites, SOA    HPI:  No acute events. States she is SOA with any movement. Progressive over the last few weeks. Reviewed imaging findings. Reviewed pending GI consult      Objective   Objective     Vital Signs:   Temp:  [97.7 øF (36.5 øC)-97.9 øF (36.6 øC)] 97.7 øF (36.5 øC)  Heart Rate:  [] 106  Resp:  [14-21] 18  BP: ()/() 106/58  Flow (L/min):  [2-3] 3     Physical Exam:  Constitutional: No acute distress, awake, alert; chronically ill appearing   HENT: NCAT, mucous membranes moist  Respiratory: diminished in the bases  Cardiovascular: RRR, no murmurs, rubs, or gallops  Gastrointestinal: Positive bowel sounds, distended, mild tenderness  Musculoskeletal: trace bilateral ankle edema  Psychiatric: Appropriate affect, cooperative  Neurologic: Oriented x 3, strength symmetric in all extremities, Cranial Nerves grossly intact to confrontation, speech clear  Skin: No rashes      Results Reviewed:  LAB RESULTS:      Lab 10/04/23  0453 10/03/23  1804 10/03/23  1720   WBC 9.18  --  10.93*   HEMOGLOBIN 11.7*  --   14.6   HEMATOCRIT 34.3  --  42.2   PLATELETS 143  --  213   NEUTROS ABS 7.52*  --  9.28*   IMMATURE GRANS (ABS) 0.04  --  0.05   LYMPHS ABS 0.69*  --  0.77   MONOS ABS 0.88  --  0.80   EOS ABS 0.03  --  0.02   MCV 94.5  --  94.4   PROCALCITONIN  --   --  0.97*   LACTATE  --  1.9  --    PROTIME  --   --  15.2*   D DIMER QUANT  --   --  2.84*         Lab 10/04/23  0453 10/03/23  2222 10/03/23  1720   SODIUM 127*  --  128*   POTASSIUM 4.5  --  5.0   CHLORIDE 96*  --  96*   CO2 16.0*  --  19.0*   ANION GAP 15.0  --  13.0   BUN 20  --  21   CREATININE 1.59*  --  1.41*   EGFR 34.2*  --  39.5*   GLUCOSE 69  --  91   CALCIUM 8.7  --  9.1   MAGNESIUM  --  1.6 2.0   TSH  --   --  4.920*         Lab 10/04/23  0453 10/03/23  1720   TOTAL PROTEIN 4.7* 5.5*   ALBUMIN 2.3* 2.3*   GLOBULIN 2.4 3.2   ALT (SGPT) 19 17   AST (SGOT) 55* 71*   BILIRUBIN 0.9 1.1   ALK PHOS 194* 262*         Lab 10/03/23  2222 10/03/23  1950/23  1720   PROBNP  --   --  10,567.0*   HSTROP T 77* 74* 80*   PROTIME  --   --  15.2*   INR  --   --  1.19*                 Lab 10/03/23  2328   PH, ARTERIAL 7.454*   PCO2, ARTERIAL 25.7*   PO2 ART 82.6*   FIO2 32   HCO3 ART 18.0*   BASE EXCESS ART -4.4*   CARBOXYHEMOGLOBIN 0.9     Brief Urine Lab Results  (Last result in the past 365 days)        Color   Clarity   Blood   Leuk Est   Nitrite   Protein   CREAT   Urine HCG        10/03/23 1839 Dark Yellow   Clear   Negative   Negative   Negative   Negative                   Microbiology Results Abnormal       Procedure Component Value - Date/Time    MRSA Screen, PCR (Inpatient) - Swab, Nares [[id number]]  (Normal) Collected: 10/03/23 7629    Lab Status: Final result Specimen: Swab from Nares Updated: 10/04/23 0811     MRSA PCR Negative    Narrative:      The negative predictive value of this diagnostic test is high and should only be used to consider de-escalating anti-MRSA therapy. A positive result may indicate colonization with MRSA and must be correlated  clinically.  MRSA Negative    Respiratory Panel PCR w/COVID-19(SARS-CoV-2) JEN/BETSY/ELIEZER/PAD/COR/MAD/URSULA In-House, NP Swab in UTM/VTM, 3-4 HR TAT - Swab, Nasopharynx [[id number]]  (Normal) Collected: 10/03/23 1726    Lab Status: Final result Specimen: Swab from Nasopharynx Updated: 10/03/23 1820     ADENOVIRUS, PCR Not Detected     Coronavirus 229E Not Detected     Coronavirus HKU1 Not Detected     Coronavirus NL63 Not Detected     Coronavirus OC43 Not Detected     COVID19 Not Detected     Human Metapneumovirus Not Detected     Human Rhinovirus/Enterovirus Not Detected     Influenza A PCR Not Detected     Influenza B PCR Not Detected     Parainfluenza Virus 1 Not Detected     Parainfluenza Virus 2 Not Detected     Parainfluenza Virus 3 Not Detected     Parainfluenza Virus 4 Not Detected     RSV, PCR Not Detected     Bordetella pertussis pcr Not Detected     Bordetella parapertussis PCR Not Detected     Chlamydophila pneumoniae PCR Not Detected     Mycoplasma pneumo by PCR Not Detected    Narrative:      In the setting of a positive respiratory panel with a viral infection PLUS a negative procalcitonin without other underlying concern for bacterial infection, consider observing off antibiotics or discontinuation of antibiotics and continue supportive care. If the respiratory panel is positive for atypical bacterial infection (Bordetella pertussis, Chlamydophila pneumoniae, or Mycoplasma pneumoniae), consider antibiotic de-escalation to target atypical bacterial infection.            CT Head Without Contrast    Result Date: 10/3/2023  CT HEAD WO CONTRAST Date of Exam: 10/3/2023 7:07 PM EDT Indication: gen weakness. Comparison: None available. Technique: Axial CT images were obtained of the head without contrast administration.  Automated exposure control and iterative construction methods were used. FINDINGS: There is no evidence for acute intracranial hemorrhage. No definitive acute focal ischemia is identified.  Nonspecific white matter changes are noted likely related to chronic small vessel ischemic changes and age-related changes. Associated diffuse volume loss is observed. There is no evidence for abnormal cerebral edema. There is no mass effect or midline shift. The ventricular system is nondilated. The basal cisterns are patent. The skull is intact. The paranasal sinuses and mastoid air cells are  clear.     Impression: 1.No evidence for acute intracranial abnormality. 2.Nonspecific white matter changes are noted with associated diffuse volume loss. These findings are likely related to chronic small vessel ischemic changes and/or age-related changes. Electronically Signed: Peter Don MD  10/3/2023 7:13 PM EDT  Workstation ID: [id number]    US Liver    Result Date: 10/4/2023  US LIVER Date of Exam: 10/4/2023 7:00 AM CDT Indication: new cirrhosis. Comparison: No comparisons available. Technique: Grayscale and color Doppler ultrasound evaluation of the right upper quadrant was performed. Findings: LIVER: Nodular contours and heterogeneous parenchymal echotexture consistent with cirrhosis. No focal lesions identified. Normal flow is present within the hepatic vasculature. GALLBLADDER: Gallbladder is surgically absent. The common bile duct measures 2 mm in diameter, normal. PANCREAS:  Visualized portions are unremarkable. AORTA/IVC:  Visualized portions are unremarkable. RIGHT KIDNEY:  Normal in size with no focal lesions. No evidence of nephrolithiasis or hydronephrosis. There is moderate volume ascites. There is partial visualization of a right pleural effusion.     Impression: Impression: 1.Cirrhosis with ascites and right pleural effusion. Electronically Signed: Isaac Smalls MD  10/4/2023 7:34 AM CDT  Workstation ID: [id number]    XR Chest 1 View    Result Date: 10/3/2023  XR CHEST 1 VW Date of Exam: 10/3/2023 4:12 PM CDT Indication: gen weakness Comparison: 7/19/2023 Findings: Cardiomediastinal silhouette is  unremarkable. There is left mid to lower lung airspace disease suggestive of pneumonia. Small to moderate left and small right effusions. No acute osseous abnormality identified.     Impression: Impression: 1.Left mid to lower lung airspace disease with small to moderate effusion suggestive of pneumonia. Correlate with symptoms. 2.Persistent small right effusion. Electronically Signed: Isaac Smalls MD  10/3/2023 4:28 PM CDT  Workstation ID: [id number]    CT Angiogram Chest    Result Date: 10/3/2023  CT ANGIOGRAM CHEST Date of Exam: 10/3/2023 7:07 PM EDT Indication: SOB, hypoxic, elevated dimer. Comparison: None available. Technique: CTA of the chest was performed after the uneventful intravenous administration of 85 mL Isovue 370. Reconstructed coronal and sagittal images were also obtained. In addition, a 3-D volume rendered image was created for interpretation. Automated exposure control and iterative reconstruction methods were used. Findings: There is generally good contrast opacification of the pulmonary arteries and no evidence of embolic disease. No evidence of thoracic aortic aneurysm or dissection is seen. There is probably high-grade origin stenosis of the left subclavian artery, with very dense calcification of the lumen on image 26 series 4. No pericardial effusion or mediastinal adenopathy is seen. There is diffuse coronary artery calcium. Multiple macrocalcifications are seen in the normal sized thyroid There is a large free-flowing left pleural effusion and moderate free-flowing right pleural effusion. There is nearly complete atelectasis of the left lower lobe and milder right lower lobe atelectasis. Mild micro bullous change is seen of the upper lobes. No other evidence of active pulmonary parenchymal disease is appreciated. There is a calcified right middle lobe granuloma. The airways appear to be normally patent. Included images of the upper abdomen show extensive ascites. Liver morphology suggests  cirrhosis, with marked enlargement of the left liver lobe and small right liver lobe. There appear to be clips in the gallbladder fossa. Spleen is normal in size. Bony structures appear to be intact.     Impression: Impression: 1. No evidence of pulmonary embolic disease. 2. Large free-flowing left pleural effusion and moderate right effusion. 3. Extensive atelectasis of the left lower lobe due to effusion and mild right lower lobe atelectasis. 4. Liver morphology consistent with cirrhosis. Upper abdominal ascites, on these limited images, appears to be extensive. 5. Incidentally noted heavy calcification of the left subclavian artery origin, suggesting high-grade stenosis, or possibly occlusion. Electronically Signed: Brian Sutherland MD  10/3/2023 7:29 PM EDT  Workstation ID: [id number]         Current medications:  Scheduled Meds:albumin human, 25 g, Intravenous, Once  aspirin, 81 mg, Oral, Daily  atorvastatin, 40 mg, Oral, Nightly  budesonide-formoterol, 2 puff, Inhalation, BID - RT   And  tiotropium bromide monohydrate, 2 puff, Inhalation, Daily - RT  bumetanide, 1 mg, Oral, BID  cefTRIAXone, 2,000 mg, Intravenous, Q24H  citalopram, 20 mg, Oral, Daily  doxycycline, 100 mg, Intravenous, Q12H  heparin (porcine), 5,000 Units, Subcutaneous, Q12H  metoprolol tartrate, 50 mg, Oral, BID With Meals  pantoprazole, 40 mg, Oral, Daily  roflumilast, 250 mcg, Oral, Daily  saccharomyces boulardii, 250 mg, Oral, BID  senna-docusate sodium, 2 tablet, Oral, BID  sodium chloride, 10 mL, Intravenous, Q12H  traZODone, 150 mg, Oral, Nightly      Continuous Infusions:   PRN Meds:.  acetaminophen    senna-docusate sodium **AND** polyethylene glycol **AND** bisacodyl **AND** bisacodyl    Calcium Replacement - Follow Nurse / BPA Driven Protocol    Magnesium Standard Dose Replacement - Follow Nurse / BPA Driven Protocol    melatonin    Phosphorus Replacement - Follow Nurse / BPA Driven Protocol    Potassium Replacement - Follow Nurse / BPA  Driven Protocol    sodium chloride    sodium chloride    Assessment & Plan   Assessment & Plan     Active Hospital Problems    Diagnosis  POA    **Acute respiratory failure with hypoxia [J96.01]  Yes    CHF (congestive heart failure) [I50.9]  Yes    Anemia [D64.9]  Yes    Anxiety and depression [F41.9, F32.A]  Yes    Chronic back pain [M54.9, G89.29]  Yes    HEIDY (acute kidney injury) [N17.9]  Yes    Hyponatremia [E87.1]  Yes    Cirrhosis of liver [K74.60]  Yes    Generalized weakness [R53.1]  Yes    Cellulitis of right lower extremity [L03.115]  Yes    GERD (gastroesophageal reflux disease) [K21.9]  Yes    Hypertension [I10]  Yes    Atherosclerosis of native arteries of extremities with intermittent claudication, other extremity [I70.218]  Yes    Chronic obstructive pulmonary disease [J44.9]  Yes    Hyperlipidemia [E78.5]  Yes      Resolved Hospital Problems   No resolved problems to display.        Brief Hospital Course to date:  73 to female here with dyspnea x 3 days and weakness. Probable new diagnosis of cirrhosis with ascites and pleural effusions.       Left > right pleural effusion  Cirrhosis/ascites (CTA chest) - new finding   Hypoalbuminemia , 2.3   - CTA chest demonstrates mod left effusion and smaller R effusion; cirrhotic liver with large ascites noted  - Liver US with cirrhosis, ascites and right pleural effusion  - GI consulted  - Diagnosis/therapeutic paracentesis ordered   - pt reports history of fatty liver. Denies alcoholism.  - Continue rocephin to cover for ? SBP    Dyspnea and hypoxia   - [place] [facility] [place], but had echo showing EF 75%; diuretic increased   - CTA chest with no PE, large left pleural effusion and moderate right pleural effusion, atelectasis  - Continue treatment for ? PNA for now. -- can likely de-escalate soon   - Consider thoracentesis. Repeat CXR in the AM      HEIDY  - Cr baseline 0.9, trending up  - ? Cardiorenal vs effect of overdiuresis  (diuretic recently increased) or ATN   - UA negative  - Hold bumex. Albumin. Start midodrine   - Neph if continues to trend up     Possible HFpEF (vs ascites and hypoalbuminemia causing pl effusions)   HTN HLD  - [place] [facility] [place]  - Currently holding bumex due to HEIDY  - Hold metoprolol due to hypotension   - continue statin  - daily weights  - strict I&O     Acute onset weakness  - until a couple weeks ago she could walk without a cane or walker  - now using a wheelchair  - PT OT   -  on high protein diet      Lightheadedness  Borderline hypotn  - holding metoprolol and bumex. Start albumin and midodrine     COPD, emphysema   - no on supplemental oxygen at home  - former smoker, now vapes. Encouraged cessation  - continue with inhalers      Hyponatremia hypervolemic   - new finding.   - check urine sodium, urine osmolality, serum osmolality  - cortisol appropriate. TSH   - monitor chemistry closely     PVD  - incidental finding of atherosclerosis of left subclavian artery, suggesting high-grade stenosis, or possibly occlusion.   - consider vascular input vs outpatient w.u     Generalized weakness  - PT/OT     Anemia  - H/H stable and at baseline     GERD  - PPI     Anxiety/depression  - Celexa, Trazodone, consider holding pending progression of hyponatremia    Expected Discharge Location and Transportation: TBD  Expected Discharge   Expected Discharge Date: 10/6/2023; Expected Discharge Time:      DVT prophylaxis:  Medical DVT prophylaxis orders are present.     AM-PAC 6 Clicks Score (PT): 13 (10/04/23 0800)    CODE STATUS:   Code Status and Medical Interventions:   Ordered at: 10/03/23 2043     Level Of Support Discussed With:    Patient     Code Status (Patient has no pulse and is not breathing):    CPR (Attempt to Resuscitate)     Medical Interventions (Patient has pulse or is breathing):    Full Support       Anna William,   10/04/23        Electronically  signed by Anna William DO at 10/04/23 1338       Home Hospice consult order from Dr. Anna William DO   Consult Notes (last 7 days)        Charlette Jonas PA-C at 10/04/23 1323        Consult Orders    1. Inpatient Gastroenterology Consult [[id number]] ordered by Suzan Rice MD at 10/04/23 0026              Attestation signed by Brunner, Mark I, MD at 10/04/23 0089    I have reviewed this documentation and agree.                  [facility] [place] [facility] [place]    Referring Provider: No ref. provider found    PCP: Carmella Barboza DO    Reason for Consultation: SIMS cirrhosis    History of present illness:    Laura Hui is a 73 y.o. female, PMH includes COPD, GERD, CHF, HTN, HL, CHF, SIMS cirrhosis, anxiety, depression, is admitted via ED yesterday for evaluation of SOA, pleural effusion.     Pt states that she has been told for many years that she has a fatty liver, denies previous GI evaluation for such. She denies FHx of liver disease or cirrhosis. She c/o generalized abdominal discomfort and chronic back pain at this time. Patient denies associated fever, chills, indigestion, nausea, vomiting, diarrhea, constipation, hematemesis, dysphagia, hematochezia, melena, dysuria, jaundice or bruising.    Labs at time of admission significant for WBC 10.93, Hb 14.6, Hct 42.2, plt 213, Bun 21, Cr 1.41, total bili 1.1, AST 71, ALT 17, alk phos 262, PT 15.2, INR 1.19,     Liver US 10/4: Cirrhosis with ascites and right pleural effusion    Patient denies personal or FHx of PUD, H Pylori, gastritis, pancreatitis, colitis, Celiac disease, UC, Crohn's disease, IBS, colon or gastric cancers. Pt denies EtOH, tobacco, illicit substance or NSAID use.    EGD / CSY in remote past, pt unsure of findings.     Allergies:  Sulfa antibiotics    Scheduled Meds:  albumin human, 25 g, Intravenous, Once  aspirin, 81 mg, Oral, Daily  atorvastatin, 40 mg, Oral, Nightly  budesonide-formoterol, 2 puff, Inhalation, BID - RT    And  tiotropium bromide monohydrate, 2 puff, Inhalation, Daily - RT  bumetanide, 1 mg, Oral, BID  cefTRIAXone, 2,000 mg, Intravenous, Q24H  citalopram, 20 mg, Oral, Daily  doxycycline, 100 mg, Intravenous, Q12H  heparin (porcine), 5,000 Units, Subcutaneous, Q12H  metoprolol tartrate, 50 mg, Oral, BID With Meals  pantoprazole, 40 mg, Oral, Daily  roflumilast, 250 mcg, Oral, Daily  saccharomyces boulardii, 250 mg, Oral, BID  senna-docusate sodium, 2 tablet, Oral, BID  sodium chloride, 10 mL, Intravenous, Q12H  traZODone, 150 mg, Oral, Nightly         Infusions:       PRN Meds:    acetaminophen    senna-docusate sodium **AND** polyethylene glycol **AND** bisacodyl **AND** bisacodyl    Calcium Replacement - Follow Nurse / BPA Driven Protocol    Magnesium Standard Dose Replacement - Follow Nurse / BPA Driven Protocol    melatonin    Phosphorus Replacement - Follow Nurse / BPA Driven Protocol    Potassium Replacement - Follow Nurse / BPA Driven Protocol    sodium chloride    sodium chloride    Home Meds:  Medications Prior to Admission   Medication Sig Dispense Refill Last Dose    albuterol (PROVENTIL) (2.5 MG/3ML) 0.083% nebulizer solution Take 2.5 mg by nebulization Every 4 (Four) Hours As Needed for Wheezing.   Past Week    albuterol sulfate  (90 Base) MCG/ACT inhaler Inhale 2 puffs Every 4 (Four) Hours As Needed for Wheezing. 8 g 3 Past Week    aspirin 81 MG chewable tablet Chew 1 tablet Daily.   10/2/2023    atorvastatin (Lipitor) 40 MG tablet Take 1 tablet by mouth Every Night. 90 tablet 1 10/2/2023    citalopram (CeleXA) 20 MG tablet TAKE ONE TABLET BY MOUTH DAILY 90 tablet 1 10/2/2023    Daliresp 250 MCG tablet tablet TAKE ONE TABLET BY MOUTH DAILY 56 tablet 0 10/2/2023    KLOR-CON 20 MEQ CR tablet TAKE ONE TABLET BY MOUTH DAILY 90 tablet 1 10/2/2023    metoprolol tartrate (LOPRESSOR) 50 MG tablet Take 1 tablet by mouth 2 (Two) Times a Day With Meals. 180 tablet 1 10/2/2023    pantoprazole (Protonix) 40 MG  EC tablet Take 1 tablet by mouth Daily. 90 tablet 1 10/2/2023    traZODone (DESYREL) 100 MG tablet TAKE TWO TABLETS BY MOUTH ONCE NIGHTLY 180 tablet 1 10/2/2023    Trelegy Ellipta 100-62.5-25 MCG/ACT inhaler Inhale 1 puff Daily.   10/2/2023    bumetanide (BUMEX) 1 MG tablet Take 1 tablet by mouth 2 (Two) Times a Day.       fluticasone (FLONASE) 50 MCG/ACT nasal spray SPRAY TWO SPRAYS IN EACH NOSTRIL ONCE DAILY 16 mL 2     Ped Multivitamins-Fl-Iron (MULTIVITAMIN WITH FLUORIDE/IRON) 0.25-10 MG/ML solution solution Take  by mouth Daily.          ROS: Review of Systems   Constitutional:  Negative for chills, diaphoresis, fatigue and unexpected weight change.   HENT:  Negative for drooling, facial swelling, mouth sores, nosebleeds, rhinorrhea, sore throat, tinnitus, trouble swallowing and voice change.    Respiratory:  Positive for shortness of breath. Negative for cough and chest tightness.    Cardiovascular:  Positive for leg swelling. Negative for chest pain and palpitations.   Gastrointestinal:  Positive for abdominal distention and abdominal pain. Negative for blood in stool, constipation, diarrhea, nausea and vomiting.   Genitourinary:  Negative for dysuria, flank pain and hematuria.   Musculoskeletal:  Negative for arthralgias, joint swelling and myalgias.   Skin:  Negative for color change, pallor and rash.   Neurological:  Positive for weakness. Negative for dizziness, tremors, syncope and light-headedness.   Psychiatric/Behavioral:  Negative for confusion and hallucinations.    All other systems reviewed and are negative.    PAST MED HX:  Past Medical History:   Diagnosis Date    Acid reflux     Anemia     Due to chronic blood loss - Chronic blood loss anemia    Apnea     Arthritis     Backache     CHF (congestive heart failure)     COPD (chronic obstructive pulmonary disease)     Severe    Degeneration of lumbar intervertebral disc     Drug therapy     Finding    Emphysema, unspecified     Esophageal reflux   "   Family history of malignant neoplasm of breast in first degree relative     Gall stones     GERD (gastroesophageal reflux disease)     H/O spinal fusion     H/O: drug dependency     Hearing loss     Heart disease     Hypertrophic cardiomyopathy     Insomnia     Mixed hyperlipidemia     Neurotic Depression     Recurrent major depressive episodes, moderate     Severe obesity     Tobacco use     History of       PAST SURG HX:  Past Surgical History:   Procedure Laterality Date    APPENDECTOMY      BACK SURGERY      BACK SURGERY      CARDIAC ABLATION      CARDIAC SURGERY      Cardiac Stent Catherization    CHOLECYSTECTOMY      Gall Bladder Surgery    HERNIA REPAIR      HIP SURGERY Left     LAPAROSCOPIC TUBAL LIGATION      NECK SURGERY      NECK SURGERY      ORTHOPEDIC SURGERY         FAM HX:  Family History   Problem Relation Age of Onset    Breast cancer Mother     Multiple sclerosis Mother     Thyroid disease Mother     Arthritis Maternal Grandmother     Heart attack Maternal Grandmother     Cancer Other        SOC HX:  Social History     Socioeconomic History    Marital status: Single   Tobacco Use    Smoking status: Former     Types: Cigarettes     Quit date: 7/11/2008     Years since quitting: 15.2    Smokeless tobacco: Never   Vaping Use    Vaping Use: Every day    Substances: Nicotine    Devices: Pre-filled or refillable cartridge   Substance and Sexual Activity    Alcohol use: No    Drug use: No    Sexual activity: Defer       PHYSICAL EXAM  /58 (BP Location: Right arm, Patient Position: Sitting)   Pulse 106   Temp 97.7 øF (36.5 øC) (Oral)   Resp 18   Ht 165.1 cm (65\")   Wt 82 kg (180 lb 11.2 oz)   SpO2 91%   BMI 30.07 kg/mý   Wt Readings from Last 3 Encounters:   10/04/23 82 kg (180 lb 11.2 oz)   09/25/23 78.5 kg (173 lb)   07/19/23 85.7 kg (189 lb)   ,body mass index is 30.07 kg/mý.  Physical Exam  Vitals and nursing note reviewed.   Constitutional:       Appearance: Normal appearance. She is " normal weight. She is ill-appearing (chronically). She is not diaphoretic.      Comments: BMI 30.07   HENT:      Head: Normocephalic and atraumatic.      Right Ear: External ear normal.      Left Ear: External ear normal.      Nose: Nose normal.      Mouth/Throat:      Mouth: Mucous membranes are moist.      Pharynx: Oropharynx is clear.   Eyes:      Conjunctiva/sclera: Conjunctivae normal.      Pupils: Pupils are equal, round, and reactive to light.   Neck:      Thyroid: No thyromegaly.   Cardiovascular:      Rate and Rhythm: Normal rate and regular rhythm.      Pulses: Normal pulses.      Heart sounds: Normal heart sounds.   Pulmonary:      Effort: Pulmonary effort is normal.      Breath sounds: Normal breath sounds.   Abdominal:      General: Abdomen is flat. Bowel sounds are normal. There is no distension.      Palpations: There is fluid wave.      Tenderness: There is abdominal tenderness (diffuse).      Comments: Body habitus limits physical exam   Musculoskeletal:         General: Normal range of motion.      Cervical back: Normal range of motion and neck supple.      Right lower leg: Edema present.      Left lower leg: Edema present.   Skin:     General: Skin is warm and dry.   Neurological:      General: No focal deficit present.      Mental Status: She is oriented to person, place, and time.   Psychiatric:         Mood and Affect: Mood normal.       Results Review:   I reviewed the patient's new clinical results.  I reviewed the patient's new imaging results and agree with the interpretation.  I reviewed the patient's other test results and agree with the interpretation    Lab Results   Component Value Date    WBC 9.18 10/04/2023    HGB 11.7 (L) 10/04/2023    HGB 14.6 10/03/2023    HGB 15.2 09/25/2023    HCT 34.3 10/04/2023    MCV 94.5 10/04/2023     10/04/2023       Lab Results   Component Value Date    INR 1.19 (H) 10/03/2023    INR 1.1 03/07/2023       Lab Results   Component Value Date     GLUCOSE 69 10/04/2023    BUN 20 10/04/2023    CREATININE 1.59 (H) 10/04/2023    EGFRIFNONA 64 03/24/2020    BCR 12.6 10/04/2023     (L) 10/04/2023    K 4.5 10/04/2023    CO2 16.0 (L) 10/04/2023    CALCIUM 8.7 10/04/2023    PROTENTOTREF 5.5 (L) 09/25/2023    ALBUMIN 2.3 (L) 10/04/2023    ALKPHOS 194 (H) 10/04/2023    BILITOT 0.9 10/04/2023    ALT 19 10/04/2023    AST 55 (H) 10/04/2023       ASSESSMENTS/PLANS    Decompensated SIMS cirrhosis with ascites  Hyponatremia  Bilateral pleural effusion  Hypoalbuminemia   - MELD-Na Score 20 / Child Peraza Score 9, Class B   - Last Para: US guided paracentesis ordered for today    - HCC Surveillance: liver US 10/4   - EV Surveillance: will need EGD, likely in outpatient setting   - HE Management: N/A   - continue protonix 40mg daily   - pt given cirrhosis lifestyle instructions: avoid hepatotoxins, limit APAP < 2g per day, avoid ASA and NSAIDs, diet of 35-40kcal/day, protein 1.2-1.5g/kg/day   - will check Hep A/B immunity, vaccinate if needed via PCP   - obtain SURINDER, anti smooth muscle Ab, mitochondrial Ab   - continue empiric Rocephin, pending paracentesis fluid studies    I discussed the patient's findings and my recommendations with patient and nursing staff. Thank you very kindly for this consultation. Will continue to follow during this hospitalization.      Charlette Jonas PA-C  10/04/23  13:24 EDT        Electronically signed by Brunner, Mark I, MD at 10/04/23 4433       Hans Blakely MD at 10/05/23 1425        Consult Orders    1. Inpatient Nephrology Consult [[id number]] ordered by Anna William DO at 10/05/23 0806              Summary:<                   Referring Provider: Dr. Khalida William  Reason for Consultation: HEIDY, hyponatremia, metabolic acidosis, edema    Subjective     Chief complaint shortness of breath, generalized weakness    History of present illness:    72-year-old female presented with progressive generalized weakness, worsening shortness  of breath ongoing 2 to 3 days prior to admission.  Also complained of lower extremity edema, increasing abdominal distention,.  Patient previously admitted [place] 9/10/2023 with acute CHF, ejection fraction 70-75% with grade 1 diastolic dysfunction, patient's creatinine with diuresis 1.3.  Patient has some lower extremity cellulitis and was treated with cephalexin with some improvement.  PMH includes HTN, HLD, PVD, COPD, GERD, CHF, anemia, anxiety, depression, chronic back pain.  Labs showed UA negative for protein or blood, serum creatinine 2.03, sodium 127, CO2 17, albumin 2.7, hemoglobin 11.2, urine sodium< 20, cortisol level 24.9.  Admission creatinine was 1.45 and sodium 134.  Significant hypotension is noted.    History  Past Medical History:   Diagnosis Date    Acid reflux     Anemia     Due to chronic blood loss - Chronic blood loss anemia    Apnea     Arthritis     Backache     CHF (congestive heart failure)     COPD (chronic obstructive pulmonary disease)     Severe    Degeneration of lumbar intervertebral disc     Drug therapy     Finding    Emphysema, unspecified     Esophageal reflux     Family history of malignant neoplasm of breast in first degree relative     Gall stones     GERD (gastroesophageal reflux disease)     H/O spinal fusion     H/O: drug dependency     Hearing loss     Heart disease     Hypertrophic cardiomyopathy     Insomnia     Mixed hyperlipidemia     Neurotic Depression     Recurrent major depressive episodes, moderate     Severe obesity     Tobacco use     History of   ,   Past Surgical History:   Procedure Laterality Date    APPENDECTOMY      BACK SURGERY      BACK SURGERY      CARDIAC ABLATION      CARDIAC SURGERY      Cardiac Stent Catherization    CHOLECYSTECTOMY      Gall Bladder Surgery    HERNIA REPAIR      HIP SURGERY Left     LAPAROSCOPIC TUBAL LIGATION      NECK SURGERY      NECK SURGERY      ORTHOPEDIC SURGERY     ,   Family History   Problem Relation Age of Onset     Breast cancer Mother     Multiple sclerosis Mother     Thyroid disease Mother     Arthritis Maternal Grandmother     Heart attack Maternal Grandmother     Cancer Other    ,   Social History     Socioeconomic History    Marital status: Single   Tobacco Use    Smoking status: Former     Types: Cigarettes     Quit date: 7/11/2008     Years since quitting: 15.2    Smokeless tobacco: Never   Vaping Use    Vaping Use: Every day    Substances: Nicotine    Devices: Pre-filled or refillable cartridge   Substance and Sexual Activity    Alcohol use: No    Drug use: No    Sexual activity: Defer     E-cigarette/Vaping    E-cigarette/Vaping Use Current Every Day User      E-cigarette/Vaping Substances    Nicotine Yes      E-cigarette/Vaping Devices    Pre-filled or Refillable Cartridge Yes          ,   Medications Prior to Admission   Medication Sig Dispense Refill Last Dose    albuterol (PROVENTIL) (2.5 MG/3ML) 0.083% nebulizer solution Take 2.5 mg by nebulization Every 4 (Four) Hours As Needed for Wheezing.   Past Week    albuterol sulfate  (90 Base) MCG/ACT inhaler Inhale 2 puffs Every 4 (Four) Hours As Needed for Wheezing. 8 g 3 Past Week    aspirin 81 MG chewable tablet Chew 1 tablet Daily.   10/2/2023    atorvastatin (Lipitor) 40 MG tablet Take 1 tablet by mouth Every Night. 90 tablet 1 10/2/2023    citalopram (CeleXA) 20 MG tablet TAKE ONE TABLET BY MOUTH DAILY 90 tablet 1 10/2/2023    Daliresp 250 MCG tablet tablet TAKE ONE TABLET BY MOUTH DAILY 56 tablet 0 10/2/2023    KLOR-CON 20 MEQ CR tablet TAKE ONE TABLET BY MOUTH DAILY 90 tablet 1 10/2/2023    metoprolol tartrate (LOPRESSOR) 50 MG tablet Take 1 tablet by mouth 2 (Two) Times a Day With Meals. 180 tablet 1 10/2/2023    pantoprazole (Protonix) 40 MG EC tablet Take 1 tablet by mouth Daily. 90 tablet 1 10/2/2023    traZODone (DESYREL) 100 MG tablet TAKE TWO TABLETS BY MOUTH ONCE NIGHTLY 180 tablet 1 10/2/2023    Trelegy Ellipta 100-62.5-25 MCG/ACT inhaler Inhale  1 puff Daily.   10/2/2023    bumetanide (BUMEX) 1 MG tablet Take 1 tablet by mouth 2 (Two) Times a Day.       fluticasone (FLONASE) 50 MCG/ACT nasal spray SPRAY TWO SPRAYS IN EACH NOSTRIL ONCE DAILY 16 mL 2     Ped Multivitamins-Fl-Iron (MULTIVITAMIN WITH FLUORIDE/IRON) 0.25-10 MG/ML solution solution Take  by mouth Daily.      , Scheduled Meds:  aspirin, 81 mg, Oral, Daily  atorvastatin, 40 mg, Oral, Nightly  budesonide-formoterol, 2 puff, Inhalation, BID - RT   And  tiotropium bromide monohydrate, 2 puff, Inhalation, Daily - RT  cefTRIAXone, 2,000 mg, Intravenous, Q24H  citalopram, 20 mg, Oral, Daily  doxycycline, 100 mg, Intravenous, Q12H  heparin (porcine), 5,000 Units, Subcutaneous, Q12H  metoprolol tartrate, 12.5 mg, Oral, BID With Meals  midodrine, 10 mg, Oral, TID AC  pantoprazole, 40 mg, Oral, Daily  rifAXIMin, 550 mg, Oral, Q12H  roflumilast, 250 mcg, Oral, Daily  saccharomyces boulardii, 250 mg, Oral, BID  senna-docusate sodium, 2 tablet, Oral, BID  sodium chloride, 10 mL, Intravenous, Q12H  traZODone, 150 mg, Oral, Nightly   , Continuous Infusions:   , PRN Meds:    acetaminophen    senna-docusate sodium **AND** polyethylene glycol **AND** bisacodyl **AND** bisacodyl    Calcium Replacement - Follow Nurse / BPA Driven Protocol    HYDROcodone-acetaminophen    hydrOXYzine    Magnesium Standard Dose Replacement - Follow Nurse / BPA Driven Protocol    melatonin    Morphine    Phosphorus Replacement - Follow Nurse / BPA Driven Protocol    Potassium Replacement - Follow Nurse / BPA Driven Protocol    sodium chloride    sodium chloride, and Allergies:  Sulfa antibiotics    Review of Systems  Pertinent items are noted in HPI, all other systems reviewed and negative    Objective     Vital Signs  Temp:  [97.7 øF (36.5 øC)-98.2 øF (36.8 øC)] 97.9 øF (36.6 øC)  Heart Rate:  [101-114] 106  Resp:  [14-18] 18  BP: ()/(42-54) 99/48    I/O this shift:  In: 100 [IV Piggyback:100]  Out: 215 [Urine:215]  I/O last 3  completed shifts:  In: 260 [P.O.:60; IV Piggyback:200]  Out: 250 [Urine:250]    Physical Exam:  General appearance:  female sick looking laying comfortable in bed.  HEENT: Atraumatic normocephalic head, eyes pupil reactive, extraocular muscle intact, nose no bleed, oropharynx clear, neck is supple, no JVD, no lymph node enlargement, trachea midline.  Lungs: Clear to auscultation, equal chest movement, no crepitation.  Heart: Normal S1, S2, no gallop, murmur, RRR.  Abdomen: Abdomen distended soft, positive tenderness positive bowel sounds.  Extremities: Positive lower extremity edema, no cyanosis, no joint swelling.  Neuro: Alert, oriented x4, no focal deficit.  Psych: Mood and affect are normal and appropriate.  Skin: Skin is warm and dry.  Positive ecchymosis noted  : No suprapubic fullness or tenderness, no Vance catheter.    Results Review:   I reviewed the patient's new clinical results.  I reviewed the patient's new imaging results and agree with the interpretation.    WBC WBC   Date Value Ref Range Status   10/05/2023 9.13 3.40 - 10.80 10*3/mm3 Final   10/04/2023 9.18 3.40 - 10.80 10*3/mm3 Final   10/03/2023 10.93 (H) 3.40 - 10.80 10*3/mm3 Final      HGB Hemoglobin   Date Value Ref Range Status   10/05/2023 11.2 (L) 12.0 - 15.9 g/dL Final   10/04/2023 11.7 (L) 12.0 - 15.9 g/dL Final   10/03/2023 14.6 12.0 - 15.9 g/dL Final      HCT Hematocrit   Date Value Ref Range Status   10/05/2023 32.7 (L) 34.0 - 46.6 % Final   10/04/2023 34.3 34.0 - 46.6 % Final   10/03/2023 42.2 34.0 - 46.6 % Final      Platlets No results found for: LABPLAT   MCV MCV   Date Value Ref Range Status   10/05/2023 95.3 79.0 - 97.0 fL Final   10/04/2023 94.5 79.0 - 97.0 fL Final   10/03/2023 94.4 79.0 - 97.0 fL Final          Sodium Sodium   Date Value Ref Range Status   10/05/2023 127 (L) 136 - 145 mmol/L Final   10/04/2023 127 (L) 136 - 145 mmol/L Final   10/03/2023 128 (L) 136 - 145 mmol/L Final      Potassium Potassium   Date  Value Ref Range Status   10/05/2023 4.6 3.5 - 5.2 mmol/L Final   10/04/2023 4.5 3.5 - 5.2 mmol/L Final   10/03/2023 5.0 3.5 - 5.2 mmol/L Final     Comment:     Slight hemolysis detected by analyzer. Results may be affected.      Chloride Chloride   Date Value Ref Range Status   10/05/2023 95 (L) 98 - 107 mmol/L Final   10/04/2023 96 (L) 98 - 107 mmol/L Final   10/03/2023 96 (L) 98 - 107 mmol/L Final      CO2 CO2   Date Value Ref Range Status   10/05/2023 17.0 (L) 22.0 - 29.0 mmol/L Final   10/04/2023 16.0 (L) 22.0 - 29.0 mmol/L Final   10/03/2023 19.0 (L) 22.0 - 29.0 mmol/L Final      BUN BUN   Date Value Ref Range Status   10/05/2023 24 (H) 8 - 23 mg/dL Final   10/04/2023 20 8 - 23 mg/dL Final   10/03/2023 21 8 - 23 mg/dL Final      Creatinine Creatinine   Date Value Ref Range Status   10/05/2023 2.03 (H) 0.57 - 1.00 mg/dL Final   10/04/2023 1.59 (H) 0.57 - 1.00 mg/dL Final   10/03/2023 1.41 (H) 0.57 - 1.00 mg/dL Final      Calcium Calcium   Date Value Ref Range Status   10/05/2023 9.1 8.6 - 10.5 mg/dL Final   10/04/2023 8.7 8.6 - 10.5 mg/dL Final   10/03/2023 9.1 8.6 - 10.5 mg/dL Final      PO4 No results found for: CAPO4   Albumin Albumin   Date Value Ref Range Status   10/05/2023 2.7 (L) 3.5 - 5.2 g/dL Final   10/04/2023 2.3 (L) 3.5 - 5.2 g/dL Final   10/03/2023 2.3 (L) 3.5 - 5.2 g/dL Final      Magnesium Magnesium   Date Value Ref Range Status   10/03/2023 1.6 1.6 - 2.4 mg/dL Final   10/03/2023 2.0 1.6 - 2.4 mg/dL Final      Uric Acid No results found for: URICACID         aspirin, 81 mg, Oral, Daily  atorvastatin, 40 mg, Oral, Nightly  budesonide-formoterol, 2 puff, Inhalation, BID - RT   And  tiotropium bromide monohydrate, 2 puff, Inhalation, Daily - RT  cefTRIAXone, 2,000 mg, Intravenous, Q24H  citalopram, 20 mg, Oral, Daily  doxycycline, 100 mg, Intravenous, Q12H  heparin (porcine), 5,000 Units, Subcutaneous, Q12H  metoprolol tartrate, 12.5 mg, Oral, BID With Meals  midodrine, 10 mg, Oral, TID  AC  pantoprazole, 40 mg, Oral, Daily  rifAXIMin, 550 mg, Oral, Q12H  roflumilast, 250 mcg, Oral, Daily  saccharomyces boulardii, 250 mg, Oral, BID  senna-docusate sodium, 2 tablet, Oral, BID  sodium chloride, 10 mL, Intravenous, Q12H  traZODone, 150 mg, Oral, Nightly           Assessment & Plan       Acute respiratory failure with hypoxia    Atherosclerosis of native arteries of extremities with intermittent claudication, other extremity    GERD (gastroesophageal reflux disease)    Hyperlipidemia    Hypertension    Chronic obstructive pulmonary disease    CHF (congestive heart failure)    Anemia    Anxiety and depression    Chronic back pain    HEIDY (acute kidney injury)    Hyponatremia    Cirrhosis of liver    Generalized weakness    Cellulitis of right lower extremity    Severe malnutrition      1.  HEIDY: Likely hepatorenal syndrome, hypotensive at time of admission.  Today creatinine 2.03, admission creatinine 1.45, 2 months ago creatinine 0.89 was close to normal.  UA did not show any proteinuria.  Urine sodium less than 20.  Likely prerenal state.  Or ATN secondary to hypotension.  S/p paracentesis, ultrasound right kidney normal size without any hydronephrosis, left kidney was not done.  2.  Hyponatremia sodium 127: Likely secondary to prerenal state secondary to hepatorenal syndrome  3.  Mild metabolic acidosis.  4.  Hypoalbuminemia: Liver disease  5.  Anemia  6.  Liver cirrhosis s/p paracentesis 3.3 L on 10/5/2023  7.  Right pleural effusion    Recommendations:  Avoid nephrotoxic medications.  IV albumin ordered.  Keep MAP greater than 65  Check volume status.  Check labs in the morning.  Daily evaluation for renal replacement therapy will be done.  Adjust medication for the new GFR.  Case discussed with the medical staff taking care of the patient.  Pending SURINDER comprehensive panel, anti-smooth muscle antibody, hepatitis panel, alpha 1 antitrypsin, mitochondrial antibody,  High risk complex patient with  multiple medical problems.    Hans Blakely MD  10/05/23  @NOW                 Electronically signed by Hans Blakely MD at 10/05/23 0339       Sarahi Romo APRN at 10/05/23 1540        Consult Orders    1. Inpatient Palliative Care MD Consult [[id number]] ordered by Anna William DO at 10/05/23 1415                 Palliative Care Initial Consult   Attending Physician: Anna William DO  Referring Provider: Anna William DO     Reason for Referral:  assistance with clarification of goals of care    Code Status:   Code Status and Medical Interventions:   Ordered at: 10/05/23 1537     Medical Intervention Limits:    NO intubation (DNI)    NO cardioversion    NO dialysis    NO artificial nutrition     Level Of Support Discussed With:    Patient    Next of Kin (If No Surrogate)     Code Status (Patient has no pulse and is not breathing):    No CPR (Do Not Attempt to Resuscitate)     Medical Interventions (Patient has pulse or is breathing):    Limited Support      Advanced Directives: Advance Directive Status: Patient does not have advance directive   Family/Support: Lives at home with Daughter Mary who is PCG.    Goals of Care: TBD.    HPI:     73 yoF presented to ED on 10/3/23 with complaints of generalized weakness and progressively worsening SOB going on for the past 2-3 days. Dyspnea worse with exertion. She has some bilateral lower extremity edema that has improved from a month ago, but notes increasing abdominal distension.      PMH significant for HTN, HLD, HFpEF, PVD, COPD, chronic back pain, GERD, anemia, and anxiety/depression.     Records reviewed indicate that patient was admitted to [facility] on 9/10/2023 and discharged on 9/11/2023 for acute decompensated congestive heart failure. She was evaluated by cardiology in the hospital and had echo performed which showed EF of 70 to 75% with normal wall motion and grade 1 diastolic dysfunction. Patient had significant  "diuretic response with bump of creatinine to 1.3. She was also found to have a type II non-STEMI, and troponins were trended to improvement. Hospital course was complicated by right lower extremity cellulitis for which she has completed a course of Cephalexin.       Per daughters report, pt lives in her home.  Prior to admission, pt was ambulatory, able to perform her ADL's with minimal assistance.     Patient is alert and oriented today.  She verbalizes that today Dr has informed her that she is terminal, prognosis < 6 months.  She voices concern about returning to her daughter's home due to having twin 11 year old grandchildren in the home.  She states that she does not wish for them to \"watch this process\".  She states that she may desire to go to SNF at discharge.      We discussed code status.  Pt verbalizes that she wishes to be DNR, no CPR, no intubation. Pt and daughter wish to monitor renal function for a few more days.  Pt eligible for home hospice at this time, Palliative care team will continue to follow patient for ongoing symptoms and goals of care clarification.     ROS:   Constitutional:  Negative for chills, fatigue and fever.   Respiratory:  Positive for shortness of breath and cough.    Cardiovascular:  Positive for leg swelling. Negative for chest pain.   Gastrointestinal:  Positive for abdominal distention. Negative for abdominal pain, diarrhea, nausea and vomiting.   Genitourinary:  Negative for dysuria and flank pain. Positive for anuria.   Neurological:  Positive for weakness.   Psychiatric/Behavioral:  Negative for agitation and confusion.    Past Medical History:   Diagnosis Date    Acid reflux     Anemia     Due to chronic blood loss - Chronic blood loss anemia    Apnea     Arthritis     Backache     CHF (congestive heart failure)     COPD (chronic obstructive pulmonary disease)     Severe    Degeneration of lumbar intervertebral disc     Drug therapy     Finding    Emphysema, unspecified  " "   Esophageal reflux     Family history of malignant neoplasm of breast in first degree relative     Gall stones     GERD (gastroesophageal reflux disease)     H/O spinal fusion     H/O: drug dependency     Hearing loss     Heart disease     Hypertrophic cardiomyopathy     Insomnia     Mixed hyperlipidemia     Neurotic Depression     Recurrent major depressive episodes, moderate     Severe obesity     Tobacco use     History of     Past Surgical History:   Procedure Laterality Date    APPENDECTOMY      BACK SURGERY      BACK SURGERY      CARDIAC ABLATION      CARDIAC SURGERY      Cardiac Stent Catherization    CHOLECYSTECTOMY      Gall Bladder Surgery    HERNIA REPAIR      HIP SURGERY Left     LAPAROSCOPIC TUBAL LIGATION      NECK SURGERY      NECK SURGERY      ORTHOPEDIC SURGERY       Social History     Socioeconomic History    Marital status: Single   Tobacco Use    Smoking status: Former     Types: Cigarettes     Quit date: 7/11/2008     Years since quitting: 15.2    Smokeless tobacco: Never   Vaping Use    Vaping Use: Every day    Substances: Nicotine    Devices: Pre-filled or refillable cartridge   Substance and Sexual Activity    Alcohol use: No    Drug use: No    Sexual activity: Defer     Family History   Problem Relation Age of Onset    Breast cancer Mother     Multiple sclerosis Mother     Thyroid disease Mother     Arthritis Maternal Grandmother     Heart attack Maternal Grandmother     Cancer Other        Allergies   Allergen Reactions    Sulfa Antibiotics Rash       Current medication reviewed for route, type, dose and frequency and are current per MAR at time of dictation.    Palliative Performance Scale Score:      BP 99/48   Pulse 106   Temp 97.9 øF (36.6 øC) (Oral)   Resp 18   Ht 165.1 cm (65\")   Wt 78.5 kg (173 lb)   SpO2 95%   BMI 28.79 kg/mý     Intake/Output Summary (Last 24 hours) at 10/5/2023 1541  Last data filed at 10/5/2023 1534  Gross per 24 hour   Intake 360 ml   Output 215 ml "   Net 145 ml       Physical Exam:    General Appearance:    Alert, cooperative, NAD. Dyspnea with conversation.    HEENT:    NC/AT, EOMI, anicteric, MMM, face relaxed   Neck:   supple, trachea midline, no JVD   Lungs:     Rales, wheezes noted.     Heart:    RRR, normal S1 and S2   Abdomen:     Normal bowel sounds, soft, nontender, nondistended   G/U:   Deferred   MSK/Extremities:   Wasting, +edema noted to BLE    Pulses:   Pulses palpable and equal bilaterally   Skin:   Warm, dry   Neurologic:   A/Ox3, cooperative   Psych:   Calm, appropriate         Labs:   Results from last 7 days   Lab Units 10/05/23  0339   WBC 10*3/mm3 9.13   HEMOGLOBIN g/dL 11.2*   HEMATOCRIT % 32.7*   PLATELETS 10*3/mm3 127*     Results from last 7 days   Lab Units 10/05/23  0339   SODIUM mmol/L 127*   POTASSIUM mmol/L 4.6   CHLORIDE mmol/L 95*   CO2 mmol/L 17.0*   BUN mg/dL 24*   CREATININE mg/dL 2.03*   GLUCOSE mg/dL 77   CALCIUM mg/dL 9.1     Results from last 7 days   Lab Units 10/05/23  0339   SODIUM mmol/L 127*   POTASSIUM mmol/L 4.6   CHLORIDE mmol/L 95*   CO2 mmol/L 17.0*   BUN mg/dL 24*   CREATININE mg/dL 2.03*   CALCIUM mg/dL 9.1   BILIRUBIN mg/dL 0.8   ALK PHOS U/L 180*   ALT (SGPT) U/L 18   AST (SGOT) U/L 51*   GLUCOSE mg/dL 77     Imaging Results (Last 72 Hours)       Procedure Component Value Units Date/Time    US Paracentesis [[id number]] Collected: 10/05/23 0830    Specimen: Body Fluid Updated: 10/05/23 0834    Narrative:      US PARACENTESIS     History: new ascites     : Demetrius Izquierdo MD.     Modality: Ultrasonography                       Sedation: None.    Anesthesia:  Lidocaine 1% local infiltration.    Estimated blood loss:  0 cc.            Technique:  A thorough discussion of the risks, benefits, and alternatives of the procedure, and if applicable, moderate sedation, was carried out with the patient. They were encouraged to ask any questions. Any questions were answered. They verbalized    understanding. A written informed consent was then signed.      A multi-component timeout was performed prior to starting the procedure using the departmental protocol.     The procedure room personnel used personal protective equipment. The operators used sterile gloves and if indicated, sterile gowns. The surgical site was prepped with chlorhexidine gluconate  and draped in the maximal applicable sterile fashion.    A preliminary ultrasonography was performed to assess the target and determine a safe access site. It showed ascites. Pertinent ultrasound images were stored to the PACS for documentation.    The access site was sterilely prepped and draped. Local anesthesia was administered. A dermatotomy was performed if needed. A catheter over the needle system was advanced into the peritoneal cavity. Straw colored fluid was aspirated and the plastic   catheter advanced into the peritoneum. The catheter was then connected to a fluid recovery system. At the end of the procedure, the catheter was withdrawn and an aseptic dressing applied. The patient tolerated the procedure well.    After uneventful recovery recovery, the patient was discharged from the department in stable condition.    The total amount of fluid recovered is given below.    Albumin was infused intravenously if ordered by the referring doctor.    Complications: None immediate.    Specimen:  The specimen was labeled and sent to the lab in appropriate carriers & containers if such was requested by the ordering provider.      Impression:      Impression:                                                                Successful ultrasound-guided paracentesis using a Right lower quadrant access with recovery of 3.3 liters of fluid as described above.    Thank you for the opportunity to assist in the care of your patient.      Electronically Signed: Demetrius Izquierdo MD    10/5/2023 8:31 AM EDT    Workstation ID: [id number]    XR Chest 1 View [[id number]]  Collected: 10/05/23 0759     Updated: 10/05/23 0803    Narrative:      XR CHEST 1 VW    Date of Exam: 10/5/2023 3:26 AM CDT    Indication: Pleural effusion    Comparison: 10/3/2023    Findings:  Cardiomediastinal silhouette is unchanged. Persistent pulmonary vascular congestion and interstitial edema. There is central pulmonary edema with small to moderate left and small right pleural effusions. No new airspace disease nor pneumothorax. No acute   osseous abnormality identified.      Impression:      Impression:  Moderate CHF/volume overload features are similar to prior exam.      Electronically Signed: Isaac Smalls MD    10/5/2023 7:00 AM CDT    Workstation ID: [id number]    US Liver [[id number]] Collected: 10/04/23 0832     Updated: 10/04/23 0837    Narrative:      US LIVER    Date of Exam: 10/4/2023 7:00 AM CDT    Indication: new cirrhosis.    Comparison: No comparisons available.    Technique: Grayscale and color Doppler ultrasound evaluation of the right upper quadrant was performed.      Findings:  LIVER: Nodular contours and heterogeneous parenchymal echotexture consistent with cirrhosis. No focal lesions identified. Normal flow is present within the hepatic vasculature.     GALLBLADDER: Gallbladder is surgically absent. The common bile duct measures 2 mm in diameter, normal.    PANCREAS:  Visualized portions are unremarkable.    AORTA/IVC:  Visualized portions are unremarkable.    RIGHT KIDNEY:  Normal in size with no focal lesions. No evidence of nephrolithiasis or hydronephrosis.    There is moderate volume ascites. There is partial visualization of a right pleural effusion.          Impression:      Impression:  1.Cirrhosis with ascites and right pleural effusion.        Electronically Signed: Isaac Smalls MD    10/4/2023 7:34 AM CDT    Workstation ID: [id number]    CT Angiogram Chest [[id number]] Collected: 10/03/23 1924     Updated: 10/03/23 1932    Narrative:      CT ANGIOGRAM CHEST    Date of Exam:  10/3/2023 7:07 PM EDT    Indication: SOB, hypoxic, elevated dimer.    Comparison: None available.    Technique: CTA of the chest was performed after the uneventful intravenous administration of 85 mL Isovue 370. Reconstructed coronal and sagittal images were also obtained. In addition, a 3-D volume rendered image was created for interpretation.   Automated exposure control and iterative reconstruction methods were used.      Findings:  There is generally good contrast opacification of the pulmonary arteries and no evidence of embolic disease. No evidence of thoracic aortic aneurysm or dissection is seen. There is probably high-grade origin stenosis of the left subclavian artery, with   very dense calcification of the lumen on image 26 series 4. No pericardial effusion or mediastinal adenopathy is seen. There is diffuse coronary artery calcium. Multiple macrocalcifications are seen in the normal sized thyroid    There is a large free-flowing left pleural effusion and moderate free-flowing right pleural effusion. There is nearly complete atelectasis of the left lower lobe and milder right lower lobe atelectasis. Mild micro bullous change is seen of the upper   lobes. No other evidence of active pulmonary parenchymal disease is appreciated. There is a calcified right middle lobe granuloma. The airways appear to be normally patent.    Included images of the upper abdomen show extensive ascites. Liver morphology suggests cirrhosis, with marked enlargement of the left liver lobe and small right liver lobe. There appear to be clips in the gallbladder fossa. Spleen is normal in size.    Bony structures appear to be intact.      Impression:      Impression:    1. No evidence of pulmonary embolic disease.    2. Large free-flowing left pleural effusion and moderate right effusion.    3. Extensive atelectasis of the left lower lobe due to effusion and mild right lower lobe atelectasis.    4. Liver morphology consistent with  cirrhosis. Upper abdominal ascites, on these limited images, appears to be extensive.    5. Incidentally noted heavy calcification of the left subclavian artery origin, suggesting high-grade stenosis, or possibly occlusion.        Electronically Signed: Brian Sutherland MD    10/3/2023 7:29 PM EDT    Workstation ID: [id number]    CT Head Without Contrast [[id number]] Collected: 10/03/23 1912     Updated: 10/03/23 1916    Narrative:      CT HEAD WO CONTRAST    Date of Exam: 10/3/2023 7:07 PM EDT    Indication: gen weakness.    Comparison: None available.    Technique: Axial CT images were obtained of the head without contrast administration.  Automated exposure control and iterative construction methods were used.      FINDINGS:  There is no evidence for acute intracranial hemorrhage. No definitive acute focal ischemia is identified. Nonspecific white matter changes are noted likely related to chronic small vessel ischemic changes and age-related changes. Associated diffuse   volume loss is observed. There is no evidence for abnormal cerebral edema. There is no mass effect or midline shift. The ventricular system is nondilated. The basal cisterns are patent. The skull is intact. The paranasal sinuses and mastoid air cells are   clear.      Impression:      1.No evidence for acute intracranial abnormality.  2.Nonspecific white matter changes are noted with associated diffuse volume loss. These findings are likely related to chronic small vessel ischemic changes and/or age-related changes.      Electronically Signed: Peter Don MD    10/3/2023 7:13 PM EDT    Workstation ID: [id number]    XR Chest 1 View [[id number]] Collected: 10/03/23 1727     Updated: 10/03/23 1731    Narrative:      XR CHEST 1 VW    Date of Exam: 10/3/2023 4:12 PM CDT    Indication: gen weakness    Comparison: 7/19/2023    Findings:  Cardiomediastinal silhouette is unremarkable. There is left mid to lower lung airspace disease suggestive of pneumonia.  Small to moderate left and small right effusions. No acute osseous abnormality identified.      Impression:      Impression:  1.Left mid to lower lung airspace disease with small to moderate effusion suggestive of pneumonia. Correlate with symptoms.  2.Persistent small right effusion.      Electronically Signed: Isaac Smalls MD    10/3/2023 4:28 PM CDT    Workstation ID: [id number]                  Diagnostics: Reviewed    A:   Acute respiratory failure with hypoxia    Atherosclerosis of native arteries of extremities with intermittent claudication, other extremity    GERD (gastroesophageal reflux disease)    Hyperlipidemia    Hypertension    Chronic obstructive pulmonary disease    CHF (congestive heart failure)    Anemia    Anxiety and depression    Chronic back pain    HEIDY (acute kidney injury)    Hyponatremia    Cirrhosis of liver    Generalized weakness    Cellulitis of right lower extremity    Severe malnutrition       S/S:   GOC clarification- met with patient in the room, spoke to her daughter Mary over the phone.  Code status changed to DNR, limited support today per patient request.   Palliative care will continue to follow patient for goals of care and symptom management.          P:    Thank you for this consult and allowing us to participate in patient's plan of care. Palliative Care Team will continue to follow patient. Please do not hesitate to contact us regarding further symptom management or goals of care needs.    Time: 40 minutes spent reviewing medical and medication records, assessing and examining patient, discussing with family, answering questions, providing some guidance about a plan and documentation of care, and coordinating care with other healthcare members, with > 50% time spent face to face.     Copied text in this note has been reviewed and is accurate as of 10/05/23.     SHMUEL Juarez  10/5/2023     Electronically signed by Sarahi Romo APRN at 10/05/23 1620        Alice Case at 10/06/23 1010        Consult Orders    1. Spiritual Care Consult [[id number]] ordered by Grisel Roblero MD at 10/06/23 4903                 Patient appreciated  visit, but was not feeling well enough for a conversation. She said that her daughter was supportive and would be with her today. She asked for prayer, especially regarding her pain in abdomen and her breathing.    Electronically signed by Alice Case at 10/06/23 9882

## 2023-10-06 NOTE — PLAN OF CARE
Goal Outcome Evaluation:  Plan of Care Reviewed With: patient        Progress: no change  Outcome Evaluation: New palliative consult for assistance with symptom management and GOC per Dr. William.  No ACP on file.  Mary Rodríguez is daughter and NOSHMUEL Mohan saw pt for initial palliative consult on 10/5 and discussed GOC including code status.  Code status adjusted to No CPR limited support.  Pt. thinks she would like to go to facility with hospice services as she does not want her twin grandchildren to watch her die.  Palliative care introduced.  Palliative brochure and meal discount card provided.  Today during palliative nursing visit, pt was sitting up in bed on 2LNC visiting a couple friends.  Pt. endorsed abdominal discomfort that ratchets up when she coughs.  She stated the pain is new onset and just started the past couple days.  During the coughing spells the pain is around a 6/10.  Dyspnea at rest noted as well.  Pt. stated she really does not have any appetite.  Last BM documented on 10/6.  Pt. planning to speak with hospice liaison when her daughter arrives from [place] this afternoon.  No needs voiced at this time.  Palliative care continuing to follow for support, POC and ongoing GOC.        Problem: Palliative Care  Goal: Enhanced Quality of Life  Intervention: Promote Advance Care Planning  Flowsheets (Taken 10/6/2023 2283)  Life Transition/Adjustment:   palliative care initiated   palliative care discussed

## 2023-10-07 LAB — 1,25(OH)2D SERPL-MCNC: 23.2 PG/ML (ref 24.8–81.5)

## 2023-10-07 PROCEDURE — 94799 UNLISTED PULMONARY SVC/PX: CPT

## 2023-10-07 PROCEDURE — 25010000002 HYDROMORPHONE PER 4 MG: Performed by: INTERNAL MEDICINE

## 2023-10-07 PROCEDURE — 25010000002 MORPHINE PER 10 MG: Performed by: INTERNAL MEDICINE

## 2023-10-07 RX ORDER — HYDROMORPHONE HYDROCHLORIDE 1 MG/ML
0.25 INJECTION, SOLUTION INTRAMUSCULAR; INTRAVENOUS; SUBCUTANEOUS
Status: DISCONTINUED | OUTPATIENT
Start: 2023-10-07 | End: 2023-10-13 | Stop reason: HOSPADM

## 2023-10-07 RX ORDER — OXYCODONE HYDROCHLORIDE 5 MG/1
5 TABLET ORAL EVERY 4 HOURS PRN
Status: DISCONTINUED | OUTPATIENT
Start: 2023-10-07 | End: 2023-10-13 | Stop reason: HOSPADM

## 2023-10-07 RX ADMIN — Medication 10 ML: at 20:08

## 2023-10-07 RX ADMIN — HYDROMORPHONE HYDROCHLORIDE 0.25 MG: 1 INJECTION, SOLUTION INTRAMUSCULAR; INTRAVENOUS; SUBCUTANEOUS at 12:39

## 2023-10-07 RX ADMIN — Medication 10 ML: at 08:16

## 2023-10-07 RX ADMIN — TIOTROPIUM BROMIDE INHALATION SPRAY 2 PUFF: 3.12 SPRAY, METERED RESPIRATORY (INHALATION) at 09:00

## 2023-10-07 RX ADMIN — HYDROCODONE BITARTRATE AND ACETAMINOPHEN 1 TABLET: 5; 325 TABLET ORAL at 11:33

## 2023-10-07 RX ADMIN — MORPHINE SULFATE 1 MG: 2 INJECTION, SOLUTION INTRAMUSCULAR; INTRAVENOUS at 08:12

## 2023-10-07 RX ADMIN — OXYCODONE HYDROCHLORIDE 5 MG: 5 TABLET ORAL at 15:57

## 2023-10-07 RX ADMIN — BUDESONIDE AND FORMOTEROL FUMARATE DIHYDRATE 2 PUFF: 160; 4.5 AEROSOL RESPIRATORY (INHALATION) at 08:58

## 2023-10-07 RX ADMIN — BUDESONIDE AND FORMOTEROL FUMARATE DIHYDRATE 2 PUFF: 160; 4.5 AEROSOL RESPIRATORY (INHALATION) at 18:49

## 2023-10-07 RX ADMIN — HYDROMORPHONE HYDROCHLORIDE 0.25 MG: 1 INJECTION, SOLUTION INTRAMUSCULAR; INTRAVENOUS; SUBCUTANEOUS at 16:40

## 2023-10-07 NOTE — PROGRESS NOTES
[facility] Medicine Services  PROGRESS NOTE    Patient Name: Laura Hui  : 1950  MRN: [id number]    Date of Admission: 10/3/2023  Primary Care Physician: Carmella Barboza DO    Subjective   Subjective     CC:  cirrhosis    HPI:  Patient is feeling poorly this morning.  Continues to have pain in her abdomen.      Objective   Objective     Vital Signs:   Temp:  [97.6 øF (36.4 øC)-98 øF (36.7 øC)] 97.8 øF (36.6 øC)  Heart Rate:  [82-94] 94  Resp:  [17-24] 24  BP: (115-126)/(64-72) 119/72  Flow (L/min):  [2-3] 2     Physical Exam:  Constitutional: No acute distress, awake, alert  Respiratory: Clear to auscultation bilaterally, respiratory effort normal   Cardiovascular: RRR, no murmurs, rubs, or gallops  Gastrointestinal: Positive bowel sounds, mild tenderness to palpation, soft  Musculoskeletal: 2+ lower extremity edema  Psychiatric: Appropriate affect, cooperative  Neurologic: Oriented x 3, no focal neurological deficits  Skin: Venous stasis changes bilaterally.      Results Reviewed:  LAB RESULTS:      Lab 10/06/23  0346 10/05/23  0339 10/04/23  0453 10/03/23  1804 10/03/23  1720   WBC 9.40 9.13 9.18  --  10.93*   HEMOGLOBIN 10.6* 11.2* 11.7*  --  14.6   HEMATOCRIT 31.3* 32.7* 34.3  --  42.2   PLATELETS 140 127* 143  --  213   NEUTROS ABS  --   --  7.52*  --  9.28*   IMMATURE GRANS (ABS)  --   --  0.04  --  0.05   LYMPHS ABS  --   --  0.69*  --  0.77   MONOS ABS  --   --  0.88  --  0.80   EOS ABS  --   --  0.03  --  0.02   MCV 94.3 95.3 94.5  --  94.4   PROCALCITONIN  --   --   --   --  0.97*   LACTATE  --   --   --  1.9  --    PROTIME  --   --   --   --  15.2*   D DIMER QUANT  --   --   --   --  2.84*         Lab 10/06/23  0346 10/05/23  0339 10/04/23  0453 10/03/23  2222 10/03/23  1720   SODIUM 129* 127* 127*  --  128*   POTASSIUM 4.5 4.6 4.5  --  5.0   CHLORIDE 95* 95* 96*  --  96*   CO2 15.0* 17.0* 16.0*  --  19.0*   ANION GAP 19.0* 15.0 15.0  --  13.0   BUN 30* 24* 20   --  21   CREATININE 2.39* 2.03* 1.59*  --  1.41*   EGFR 21.0* 25.5* 34.2*  --  39.5*   GLUCOSE 74 77 69  --  91   CALCIUM 10.1 9.1 8.7  --  9.1   MAGNESIUM  --   --   --  1.6 2.0   PHOSPHORUS 3.7  --   --   --   --    TSH  --   --   --   --  4.920*         Lab 10/06/23  0346 10/05/23  0339 10/04/23  0453 10/03/23  1720   TOTAL PROTEIN 5.5* 4.6* 4.7* 5.5*   ALBUMIN 3.6 2.7* 2.3* 2.3*   GLOBULIN 1.9 1.9 2.4 3.2   ALT (SGPT) 15 18 19 17   AST (SGOT) 40* 51* 55* 71*   BILIRUBIN 0.9 0.8 0.9 1.1   ALK PHOS 164* 180* 194* 262*         Lab 10/03/23  2222 10/03/23  1950/23  1720   PROBNP  --   --  10,567.0*   HSTROP T 77* 74* 80*   PROTIME  --   --  15.2*   INR  --   --  1.19*             Lab 10/04/23  0453   IRON 50   IRON SATURATION (TSAT) 35   TIBC 143*   TRANSFERRIN 96*         Lab 10/03/23  2328   PH, ARTERIAL 7.454*   PCO2, ARTERIAL 25.7*   PO2 ART 82.6*   FIO2 32   HCO3 ART 18.0*   BASE EXCESS ART -4.4*   CARBOXYHEMOGLOBIN 0.9     Brief Urine Lab Results  (Last result in the past 365 days)        Color   Clarity   Blood   Leuk Est   Nitrite   Protein   CREAT   Urine HCG        10/05/23 1322 Yellow   Clear   Negative   Negative   Negative   Negative                   Microbiology Results Abnormal       Procedure Component Value - Date/Time    Blood Culture - Blood, Hand, Left [[id number]]  (Normal) Collected: 10/03/23 1804    Lab Status: Preliminary result Specimen: Blood from Hand, Left Updated: 10/06/23 1815     Blood Culture No growth at 3 days    Blood Culture - Blood, Arm, Right [[id number]]  (Normal) Collected: 10/03/23 1720    Lab Status: Preliminary result Specimen: Blood from Arm, Right Updated: 10/06/23 1815     Blood Culture No growth at 3 days    MRSA Screen, PCR (Inpatient) - Swab, Nares [[id number]]  (Normal) Collected: 10/03/23 8747    Lab Status: Final result Specimen: Swab from Nares Updated: 10/04/23 0811     MRSA PCR Negative    Narrative:      The negative predictive value of this diagnostic test  is high and should only be used to consider de-escalating anti-MRSA therapy. A positive result may indicate colonization with MRSA and must be correlated clinically.  MRSA Negative    Respiratory Panel PCR w/COVID-19(SARS-CoV-2) JEN/BETSY/ELIEZER/PAD/COR/MAD/URSULA In-House, NP Swab in UTM/VTM, 3-4 HR TAT - Swab, Nasopharynx [[id number]]  (Normal) Collected: 10/03/23 1726    Lab Status: Final result Specimen: Swab from Nasopharynx Updated: 10/03/23 9217     ADENOVIRUS, PCR Not Detected     Coronavirus 229E Not Detected     Coronavirus HKU1 Not Detected     Coronavirus NL63 Not Detected     Coronavirus OC43 Not Detected     COVID19 Not Detected     Human Metapneumovirus Not Detected     Human Rhinovirus/Enterovirus Not Detected     Influenza A PCR Not Detected     Influenza B PCR Not Detected     Parainfluenza Virus 1 Not Detected     Parainfluenza Virus 2 Not Detected     Parainfluenza Virus 3 Not Detected     Parainfluenza Virus 4 Not Detected     RSV, PCR Not Detected     Bordetella pertussis pcr Not Detected     Bordetella parapertussis PCR Not Detected     Chlamydophila pneumoniae PCR Not Detected     Mycoplasma pneumo by PCR Not Detected    Narrative:      In the setting of a positive respiratory panel with a viral infection PLUS a negative procalcitonin without other underlying concern for bacterial infection, consider observing off antibiotics or discontinuation of antibiotics and continue supportive care. If the respiratory panel is positive for atypical bacterial infection (Bordetella pertussis, Chlamydophila pneumoniae, or Mycoplasma pneumoniae), consider antibiotic de-escalation to target atypical bacterial infection.            No radiology results from the last 24 hrs        Current medications:  Scheduled Meds:aspirin, 81 mg, Oral, Daily  atorvastatin, 40 mg, Oral, Nightly  budesonide-formoterol, 2 puff, Inhalation, BID - RT   And  tiotropium bromide monohydrate, 2 puff, Inhalation, Daily - RT  citalopram, 20  mg, Oral, Daily  heparin (porcine), 5,000 Units, Subcutaneous, Q12H  metoprolol tartrate, 12.5 mg, Oral, BID With Meals  midodrine, 10 mg, Oral, TID AC  pantoprazole, 40 mg, Oral, Daily  rifAXIMin, 550 mg, Oral, Q12H  roflumilast, 250 mcg, Oral, Daily  saccharomyces boulardii, 250 mg, Oral, BID  senna-docusate sodium, 2 tablet, Oral, BID  sodium chloride, 10 mL, Intravenous, Q12H  traZODone, 150 mg, Oral, Nightly      Continuous Infusions:   PRN Meds:.  acetaminophen    senna-docusate sodium **AND** polyethylene glycol **AND** bisacodyl **AND** bisacodyl    Calcium Replacement - Follow Nurse / BPA Driven Protocol    HYDROcodone-acetaminophen    hydrOXYzine    Magnesium Standard Dose Replacement - Follow Nurse / BPA Driven Protocol    melatonin    Morphine    Phosphorus Replacement - Follow Nurse / BPA Driven Protocol    Potassium Replacement - Follow Nurse / BPA Driven Protocol    sodium chloride    sodium chloride    Assessment & Plan   Assessment & Plan     Active Hospital Problems    Diagnosis  POA    **Acute respiratory failure with hypoxia [J96.01]  Yes    Severe malnutrition [E43]  Yes    CHF (congestive heart failure) [I50.9]  Yes    Anemia [D64.9]  Yes    Anxiety and depression [F41.9, F32.A]  Yes    Chronic back pain [M54.9, G89.29]  Yes    HEIDY (acute kidney injury) [N17.9]  Yes    Hyponatremia [E87.1]  Yes    Cirrhosis of liver [K74.60]  Yes    Generalized weakness [R53.1]  Yes    Cellulitis of right lower extremity [L03.115]  Yes    GERD (gastroesophageal reflux disease) [K21.9]  Yes    Hypertension [I10]  Yes    Atherosclerosis of native arteries of extremities with intermittent claudication, other extremity [I70.218]  Yes    Chronic obstructive pulmonary disease [J44.9]  Yes    Hyperlipidemia [E78.5]  Yes      Resolved Hospital Problems   No resolved problems to display.        Brief Hospital Course to date:  73 to female here with dyspnea x 3 days and weakness. Probable new diagnosis of cirrhosis with  ascites and pleural effusions. CTA chest demonstrates mod left effusion and smaller R effusion; cirrhotic liver with large ascites noted. Liver US with cirrhosis, ascites and right pleural effusion. Diagnosis/therapeutic paracentesis was performed on 10/4 with 3.3L removed. SAAG consistent with protal HTN. No evidence of SBP.  Creatinine elevated on presentation, noted to be 1.41.  Urine sodium less than 20.  UA negative.  She received albumin and midodrine without improvement.     Left > right pleural effusion  Cirrhosis/ascites (CTA chest) - new finding   Hypoalbuminemia , 2.3   - GI consulted - additional workup for cirrhosis sent and currently unremarkable  - Palliative care following.    - Discussed with daughter at the bedside. Focus is for comfort and she is trying to decide between inpatient vs outpatient hospice. In agreement with discontinuing fingerstick and blood draws. Pain control not optimal; Will dc norco and change to PO oxycodone q4 hours and change from morphine to Dilaudid. Monitor for sedation.     HEIDY, suspected hepatorenal syndrome  -Creatinine trending up, today 2.39.  - likely hepatorenal. Urine Na < 20. UA negative. S/p albumin and midodrine with no improvement.   - Nephrology consulted. Repeat albumin today.   -Concern for hepatorenal syndrome.  - [facility] per nephrology   -Palliative and hospice following. To follow-up on Monday to reevaluate hospice criteria.      Dyspnea and hypoxia   - she was recently admitted at [facility] and diagnosed w CHF, but had echo showing EF 75%; diuretic increased   - CTA chest with no PE, large left pleural effusion and moderate right pleural effusion, atelectasis  - Improvement in resp status following paracentesis.   - Initially on doxy/rocephin, discontinued due to low suspicion for pneumonia     Possible HFpEF  HTN HLD  - recent admission with echo at [facility]  - Currently holding bumex due to HEIDY  - Hold metoprolol due to hypotension   - continue statin  - daily  weights  - strict I&O     Acute onset weakness  - until a couple weeks ago she could walk without a cane or walker  - now using a wheelchair  - PT OT      Lightheadedness  Borderline hypotension  - holding metoprolol and bumex. Continue albumin and midodrine      COPD, emphysema   - no on supplemental oxygen at home  - former smoker, now vapes. Encouraged cessation  - continue with inhalers      Hyponatremia hypervolemic   - new finding.   - cortisol appropriate. TSH mildly elevated at 4.9, T4 normal.      PVD  - incidental finding of atherosclerosis of left subclavian artery, suggesting high-grade stenosis, or possibly occlusion.   - consider vascular input vs outpatient w.u     Generalized weakness  - PT/OT     Anemia  - H/H stable and at baseline     GERD  - PPI     Anxiety/depression  - Celexa, Trazodone, consider holding pending progression of hyponatremia     Expected Discharge Location and Transportation: D  Expected Discharge   Expected Discharge Date: 10/9/2023; Expected Discharge Time:      DVT prophylaxis:  Medical DVT prophylaxis orders are present.     AM-PAC 6 Clicks Score (PT): 12 (10/06/23 0800)    CODE STATUS:   Code Status and Medical Interventions:   Ordered at: 10/05/23 1537     Medical Intervention Limits:    NO intubation (DNI)    NO cardioversion    NO dialysis    NO artificial nutrition     Level Of Support Discussed With:    Patient    Next of Kin (If No Surrogate)     Code Status (Patient has no pulse and is not breathing):    No CPR (Do Not Attempt to Resuscitate)     Medical Interventions (Patient has pulse or is breathing):    Limited Support     This patient's problems and plans were partially entered by my partner and updated as appropriate by me 10/07/23. Today is my first day evaluating this patient's active medical problems. I Personally reviewed chart and adjusted note to reflect daily changes in management/clinical condition. Copied text in this note has been reviewed and is  accurate as of  10/07/23      Lily Patel MD  10/07/23

## 2023-10-07 NOTE — PROGRESS NOTES
"Palliative Care Daily Progress Note     S: Clinically, the patient continues to have some labored breathing and is dyspneic with conversation.  She states that the medication has helped but she remains very limited in what she can do without decompensating.  She denies any significant pain and states that the medication has helped.      O:   Palliative Performance Scale Score: 30%   /65 (BP Location: Right arm, Patient Position: Lying)   Pulse 106   Temp 97.6 øF (36.4 øC) (Oral)   Resp 20   Ht 165.1 cm (65\")   Wt 82.5 kg (181 lb 12.8 oz)   SpO2 96%   BMI 30.25 kg/mý     Intake/Output Summary (Last 24 hours) at 10/7/2023 1521  Last data filed at 10/7/2023 0812  Gross per 24 hour   Intake 50 ml   Output --   Net 50 ml       PE:  General Appearance:    Chronically ill-appearing, alert, cooperative   HEENT:    NC/AT, dry mucous membranes   Neck:   supple, trachea midline, no JVD   Lungs:   Bilateral rales    Heart:    RRR   Abdomen:     Soft, NT, ND, NABS    Extremities:   1-2+ edema   Pulses:   Pulses palpable    Skin:   Warm, dry   Neurologic:   Awake , pleasant and cooperative, generalized weakness   Psych:   Calm, appropriate         Meds: Reviewed    Labs:   Results from last 7 days   Lab Units 10/06/23  0346   WBC 10*3/mm3 9.40   HEMOGLOBIN g/dL 10.6*   HEMATOCRIT % 31.3*   PLATELETS 10*3/mm3 140     Results from last 7 days   Lab Units 10/06/23  0346   SODIUM mmol/L 129*   POTASSIUM mmol/L 4.5   CHLORIDE mmol/L 95*   CO2 mmol/L 15.0*   BUN mg/dL 30*   CREATININE mg/dL 2.39*   GLUCOSE mg/dL 74   CALCIUM mg/dL 10.1     Results from last 7 days   Lab Units 10/06/23  0346   SODIUM mmol/L 129*   POTASSIUM mmol/L 4.5   CHLORIDE mmol/L 95*   CO2 mmol/L 15.0*   BUN mg/dL 30*   CREATININE mg/dL 2.39*   CALCIUM mg/dL 10.1   BILIRUBIN mg/dL 0.9   ALK PHOS U/L 164*   ALT (SGPT) U/L 15   AST (SGOT) U/L 40*   GLUCOSE mg/dL 74     Imaging Results (Last 72 Hours)       Procedure Component Value Units Date/Time    US " Paracentesis [[id number]] Collected: 10/05/23 0830    Specimen: Body Fluid Updated: 10/05/23 0834    Narrative:      US PARACENTESIS     History: new ascites     : Demetrius Izquierdo MD.     Modality: Ultrasonography                       Sedation: None.    Anesthesia:  Lidocaine 1% local infiltration.    Estimated blood loss:  0 cc.            Technique:  A thorough discussion of the risks, benefits, and alternatives of the procedure, and if applicable, moderate sedation, was carried out with the patient. They were encouraged to ask any questions. Any questions were answered. They verbalized   understanding. A written informed consent was then signed.      A multi-component timeout was performed prior to starting the procedure using the departmental protocol.     The procedure room personnel used personal protective equipment. The operators used sterile gloves and if indicated, sterile gowns. The surgical site was prepped with chlorhexidine gluconate  and draped in the maximal applicable sterile fashion.    A preliminary ultrasonography was performed to assess the target and determine a safe access site. It showed ascites. Pertinent ultrasound images were stored to the PACS for documentation.    The access site was sterilely prepped and draped. Local anesthesia was administered. A dermatotomy was performed if needed. A catheter over the needle system was advanced into the peritoneal cavity. Straw colored fluid was aspirated and the plastic   catheter advanced into the peritoneum. The catheter was then connected to a fluid recovery system. At the end of the procedure, the catheter was withdrawn and an aseptic dressing applied. The patient tolerated the procedure well.    After uneventful recovery recovery, the patient was discharged from the department in stable condition.    The total amount of fluid recovered is given below.    Albumin was infused intravenously if ordered by the referring  doctor.    Complications: None immediate.    Specimen:  The specimen was labeled and sent to the lab in appropriate carriers & containers if such was requested by the ordering provider.      Impression:      Impression:                                                                Successful ultrasound-guided paracentesis using a Right lower quadrant access with recovery of 3.3 liters of fluid as described above.    Thank you for the opportunity to assist in the care of your patient.      Electronically Signed: Demetrius Izquierdo MD    10/5/2023 8:31 AM EDT    Workstation ID: [id number]    XR Chest 1 View [[id number]] Collected: 10/05/23 0759     Updated: 10/05/23 0803    Narrative:      XR CHEST 1 VW    Date of Exam: 10/5/2023 3:26 AM CDT    Indication: Pleural effusion    Comparison: 10/3/2023    Findings:  Cardiomediastinal silhouette is unchanged. Persistent pulmonary vascular congestion and interstitial edema. There is central pulmonary edema with small to moderate left and small right pleural effusions. No new airspace disease nor pneumothorax. No acute   osseous abnormality identified.      Impression:      Impression:  Moderate CHF/volume overload features are similar to prior exam.      Electronically Signed: Isaac Smalls MD    10/5/2023 7:00 AM CDT    Workstation ID: [id number]              Diagnostics: Reviewed    A: Clinically with some mild improvement but continues to have some respiratory distress.  The medications are helpful and she has had some slight improvement.      P: Continue present management.  We will continue to monitor and adjust medications as needed.  We will provide support and assist with medical decisions and assist with discharge plans.      We will continue to follow along. Please do not hesitate to contact us regarding further sx mgmt or GOC needs, including after hours or on weekends via our on call provider at [phone].     Dante Bauman MD    10/7/2023

## 2023-10-07 NOTE — PROGRESS NOTES
Continued Stay Note  [facility]     Patient Name: Laura Hui  MRN: [id number]  Today's Date: 10/7/2023    Admit Date: 10/3/2023    Plan: TBD   Discharge Plan       Row Name 10/07/23 0950       Plan    Plan TBD    Patient/Family in Agreement with Plan yes    Plan Comments Call placed to Mary, daughter, who states that she will be @ the hospital a little later this morning. Mary states that she has not made a decision r/t hospice care. Mary has no additional hospice related questions @ this time. Mary states that she would like to speak w/ the MD. Writer told Mary to inform the RN when she arrives & that she could reach out to the pt.'s attending. Hospice will continue to follow. If further assistance is needed, please call [phone].                   Discharge Codes    No documentation.                 Expected Discharge Date and Time       Expected Discharge Date Expected Discharge Time    Oct 9, 2023               Luly Ferguson

## 2023-10-07 NOTE — PLAN OF CARE
Problem: Adult Inpatient Plan of Care  Goal: Plan of Care Review  Outcome: Ongoing, Progressing  Goal: Patient-Specific Goal (Individualized)  Outcome: Ongoing, Progressing  Goal: Absence of Hospital-Acquired Illness or Injury  Outcome: Ongoing, Progressing  Intervention: Identify and Manage Fall Risk  Recent Flowsheet Documentation  Taken 10/6/2023 2207 by Esther Kent RN  Safety Promotion/Fall Prevention: safety round/check completed  Taken 10/6/2023 2029 by Esther Kent RN  Safety Promotion/Fall Prevention: safety round/check completed  Intervention: Prevent Skin Injury  Recent Flowsheet Documentation  Taken 10/6/2023 2207 by Esther Kent RN  Body Position: weight shifting  Taken 10/6/2023 2029 by Esther Kent RN  Body Position: weight shifting  Intervention: Prevent and Manage VTE (Venous Thromboembolism) Risk  Recent Flowsheet Documentation  Taken 10/6/2023 2207 by Esther Kent RN  Activity Management: activity encouraged  Taken 10/6/2023 2029 by Esther Kent RN  Activity Management: activity encouraged  Range of Motion: active ROM (range of motion) encouraged  Intervention: Prevent Infection  Recent Flowsheet Documentation  Taken 10/6/2023 2207 by Esther Kent RN  Infection Prevention: environmental surveillance performed  Taken 10/6/2023 2029 by Esther Kent RN  Infection Prevention: environmental surveillance performed  Goal: Optimal Comfort and Wellbeing  Outcome: Ongoing, Progressing  Intervention: Provide Person-Centered Care  Recent Flowsheet Documentation  Taken 10/6/2023 2029 by Esther Kent RN  Trust Relationship/Rapport:   care explained   choices provided   emotional support provided   questions answered   thoughts/feelings acknowledged  Goal: Readiness for Transition of Care  Outcome: Ongoing, Progressing     Problem: Fall Injury Risk  Goal: Absence of Fall and Fall-Related Injury  Outcome: Ongoing, Progressing  Intervention: Promote Injury-Free Environment  Recent  Flowsheet Documentation  Taken 10/6/2023 2207 by Esther Kent, RN  Safety Promotion/Fall Prevention: safety round/check completed  Taken 10/6/2023 2029 by Esther Kent RN  Safety Promotion/Fall Prevention: safety round/check completed     Problem: Skin Injury Risk Increased  Goal: Skin Health and Integrity  Outcome: Ongoing, Progressing  Intervention: Optimize Skin Protection  Recent Flowsheet Documentation  Taken 10/6/2023 2207 by Esther Kent, RN  Head of Bed (HOB) Positioning: HOB elevated  Taken 10/6/2023 2029 by Esther Kent RN  Head of Bed (HOB) Positioning: HOB elevated     Problem: Palliative Care  Goal: Enhanced Quality of Life  Outcome: Ongoing, Progressing   Goal Outcome Evaluation:      No acute events   A&O x4  2L via nasal cannula  NSR

## 2023-10-07 NOTE — PROGRESS NOTES
"   LOS: 4 days    Patient Care Team:  Carmella Barboza DO as PCP - General (Family Medicine)  Provider, No Known as PCP - Family Medicine    Chief Complaint: Shortness of breath, generalized weakness  HPI:  72-year-old admitted with SOB, generalized weakness lower extremity edema, increased abdominal distention, was previously admitted at [facility] in September with acute CHF, ejection fraction was 70-75% diastolic dysfunction creatinine after diuresis was 1.3.  Patient is admitted at [facility], labs shows  UA negative for protein or blood, serum creatinine 2.03, sodium 127, CO2 17, albumin 2.7, hemoglobin 11.2, urine sodium< 20, cortisol level 24.9.  Admission creatinine was 1.45 and sodium 134.  Significant hypotension is noted.  Patient also had a CT angiogram done on 10/3/2023  Subjective     Interval History:   Discussion with the daughter and the patient.  Patient declined any further treatment  Review of Systems:   No new complaints.      Objective     Vital Sign Min/Max for last 24 hours  Temp  Min: 97.6 øF (36.4 øC)  Max: 98 øF (36.7 øC)   BP  Min: 115/64  Max: 126/64   Pulse  Min: 82  Max: 106   Resp  Min: 17  Max: 24   SpO2  Min: 91 %  Max: 96 %   Flow (L/min)  Min: 2  Max: 3.5   No data recorded     Flowsheet Rows      Flowsheet Row First Filed Value   Admission Height 165.1 cm (65\") Documented at 10/03/2023 1715   Admission Weight 77.1 kg (170 lb) Documented at 10/03/2023 1715            I/O this shift:  In: 50 [P.O.:50]  Out: -   I/O last 3 completed shifts:  In: 50 [P.O.:50]  Out: -     Physical Exam: Not done  WBC WBC   Date Value Ref Range Status   10/06/2023 9.40 3.40 - 10.80 10*3/mm3 Final   10/05/2023 9.13 3.40 - 10.80 10*3/mm3 Final      HGB Hemoglobin   Date Value Ref Range Status   10/06/2023 10.6 (L) 12.0 - 15.9 g/dL Final   10/05/2023 11.2 (L) 12.0 - 15.9 g/dL Final      HCT Hematocrit   Date Value Ref Range Status   10/06/2023 31.3 (L) 34.0 - 46.6 % Final   10/05/2023 32.7 (L) 34.0 - " "46.6 % Final      Platlets No results found for: \"LABPLAT\"   MCV MCV   Date Value Ref Range Status   10/06/2023 94.3 79.0 - 97.0 fL Final   10/05/2023 95.3 79.0 - 97.0 fL Final          Sodium Sodium   Date Value Ref Range Status   10/06/2023 129 (L) 136 - 145 mmol/L Final   10/05/2023 127 (L) 136 - 145 mmol/L Final      Potassium Potassium   Date Value Ref Range Status   10/06/2023 4.5 3.5 - 5.2 mmol/L Final   10/05/2023 4.6 3.5 - 5.2 mmol/L Final      Chloride Chloride   Date Value Ref Range Status   10/06/2023 95 (L) 98 - 107 mmol/L Final   10/05/2023 95 (L) 98 - 107 mmol/L Final      CO2 CO2   Date Value Ref Range Status   10/06/2023 15.0 (L) 22.0 - 29.0 mmol/L Final   10/05/2023 17.0 (L) 22.0 - 29.0 mmol/L Final      BUN BUN   Date Value Ref Range Status   10/06/2023 30 (H) 8 - 23 mg/dL Final   10/05/2023 24 (H) 8 - 23 mg/dL Final      Creatinine Creatinine   Date Value Ref Range Status   10/06/2023 2.39 (H) 0.57 - 1.00 mg/dL Final   10/05/2023 2.03 (H) 0.57 - 1.00 mg/dL Final      Calcium Calcium   Date Value Ref Range Status   10/06/2023 10.1 8.6 - 10.5 mg/dL Final   10/05/2023 9.1 8.6 - 10.5 mg/dL Final      PO4 No results found for: \"CAPO4\"   Albumin Albumin   Date Value Ref Range Status   10/06/2023 3.6 3.5 - 5.2 g/dL Final   10/05/2023 2.7 (L) 3.5 - 5.2 g/dL Final      Magnesium No results found for: \"MG\"     Uric Acid No results found for: \"URICACID\"        Results Review:     I reviewed the patient's new clinical results.    aspirin, 81 mg, Oral, Daily  atorvastatin, 40 mg, Oral, Nightly  budesonide-formoterol, 2 puff, Inhalation, BID - RT   And  tiotropium bromide monohydrate, 2 puff, Inhalation, Daily - RT  citalopram, 20 mg, Oral, Daily  heparin (porcine), 5,000 Units, Subcutaneous, Q12H  metoprolol tartrate, 12.5 mg, Oral, BID With Meals  midodrine, 10 mg, Oral, TID AC  pantoprazole, 40 mg, Oral, Daily  rifAXIMin, 550 mg, Oral, Q12H  roflumilast, 250 mcg, Oral, Daily  saccharomyces boulardii, 250 " mg, Oral, BID  senna-docusate sodium, 2 tablet, Oral, BID  sodium chloride, 10 mL, Intravenous, Q12H  traZODone, 150 mg, Oral, Nightly           Medication Review: Reviewed    Assessment & Plan       Acute respiratory failure with hypoxia    Atherosclerosis of native arteries of extremities with intermittent claudication, other extremity    GERD (gastroesophageal reflux disease)    Hyperlipidemia    Hypertension    Chronic obstructive pulmonary disease    CHF (congestive heart failure)    Anemia    Anxiety and depression    Chronic back pain    HEIDY (acute kidney injury)    Hyponatremia    Cirrhosis of liver    Generalized weakness    Cellulitis of right lower extremity    Severe malnutrition    1.  HEIDY: Likely hepatorenal syndrome, hypotensive at time of admission.  Today creatinine 2.03, admission creatinine 1.45, 2 months ago creatinine 0.89 was close to normal.  UA did not show any proteinuria.  Urine sodium less than 20.  Likely prerenal state.  Or ATN secondary to hypotension, IV contrast for CT angio, s/p paracentesis, ultrasound right kidney normal size without any hydronephrosis, left kidney was not done.  2.  Hyponatremia: Likely secondary to prerenal state secondary to hepatorenal syndrome  3.  Mild metabolic acidosis.  4.  Hypoalbuminemia: Liver disease  5.  Anemia  6.  Liver cirrhosis s/p paracentesis 3.3 L on 10/5/2023  7.  Right pleural effusion     Recommendations:  Discussed with the patient who does not want any further treatment at this time.  I will sign off       Hans Blakely MD  10/07/23  12:22 EDT

## 2023-10-08 LAB
BACTERIA SPEC AEROBE CULT: NORMAL
BACTERIA SPEC AEROBE CULT: NORMAL

## 2023-10-08 PROCEDURE — 99232 SBSQ HOSP IP/OBS MODERATE 35: CPT | Performed by: INTERNAL MEDICINE

## 2023-10-08 PROCEDURE — 94799 UNLISTED PULMONARY SVC/PX: CPT

## 2023-10-08 PROCEDURE — 25010000002 HYDROMORPHONE PER 4 MG: Performed by: INTERNAL MEDICINE

## 2023-10-08 RX ADMIN — OXYCODONE HYDROCHLORIDE 5 MG: 5 TABLET ORAL at 08:06

## 2023-10-08 RX ADMIN — Medication 10 ML: at 08:14

## 2023-10-08 RX ADMIN — OXYCODONE HYDROCHLORIDE 5 MG: 5 TABLET ORAL at 16:36

## 2023-10-08 RX ADMIN — HYDROMORPHONE HYDROCHLORIDE 0.25 MG: 1 INJECTION, SOLUTION INTRAMUSCULAR; INTRAVENOUS; SUBCUTANEOUS at 20:33

## 2023-10-08 RX ADMIN — HYDROMORPHONE HYDROCHLORIDE 0.25 MG: 1 INJECTION, SOLUTION INTRAMUSCULAR; INTRAVENOUS; SUBCUTANEOUS at 13:13

## 2023-10-08 RX ADMIN — Medication 10 ML: at 20:34

## 2023-10-08 RX ADMIN — BUDESONIDE AND FORMOTEROL FUMARATE DIHYDRATE 2 PUFF: 160; 4.5 AEROSOL RESPIRATORY (INHALATION) at 22:00

## 2023-10-08 RX ADMIN — MIDODRINE HYDROCHLORIDE 10 MG: 10 TABLET ORAL at 12:19

## 2023-10-08 RX ADMIN — BUDESONIDE AND FORMOTEROL FUMARATE DIHYDRATE 2 PUFF: 160; 4.5 AEROSOL RESPIRATORY (INHALATION) at 09:19

## 2023-10-08 RX ADMIN — HYDROMORPHONE HYDROCHLORIDE 0.25 MG: 1 INJECTION, SOLUTION INTRAMUSCULAR; INTRAVENOUS; SUBCUTANEOUS at 06:29

## 2023-10-08 RX ADMIN — TIOTROPIUM BROMIDE INHALATION SPRAY 2 PUFF: 3.12 SPRAY, METERED RESPIRATORY (INHALATION) at 09:21

## 2023-10-08 RX ADMIN — HYDROMORPHONE HYDROCHLORIDE 0.25 MG: 1 INJECTION, SOLUTION INTRAMUSCULAR; INTRAVENOUS; SUBCUTANEOUS at 00:49

## 2023-10-08 RX ADMIN — MIDODRINE HYDROCHLORIDE 10 MG: 10 TABLET ORAL at 18:13

## 2023-10-08 NOTE — NURSING NOTE
"Upon assessing client and administration of prescribed medications, client explained to staff nurse her wishes not to take any of her medications except for her pain medications,staff nurse reinforced to client the need and the importance of taking her medications as prescribed, but client still refused to take them,stating that \"I only want to take my pain medications, that's all\", charge nurse notified and is aware of clients refusal. Staff nurse also tried to encourage client to eat some of her breakfast that was brought to her room, but client refused that as well, did take some sips of water to take her prescribed pain medication,as she requested for pain of the abdomen.  "

## 2023-10-08 NOTE — PROGRESS NOTES
"GI Daily Progress Note  Subjective:    Chief Complaint: Follow-up decompensated cirrhosis.    Patient has increasing abdominal distention from ascites, but is not uncomfortable.  She has reduced urine output.  Denies nausea.  Has anorexia and is taking virtually nothing by mouth.    Objective:    BP 92/56   Pulse 76   Temp 97.6 øF (36.4 øC) (Oral)   Resp 20   Ht 165.1 cm (65\")   Wt 82.1 kg (181 lb)   SpO2 96%   BMI 30.12 kg/mý     Physical Exam  Constitutional:       General: She is not in acute distress.     Appearance: She is ill-appearing. She is not toxic-appearing.   HENT:      Head: Normocephalic.      Nose: Nose normal. No congestion.      Mouth/Throat:      Mouth: Mucous membranes are moist.   Eyes:      General: No scleral icterus.  Cardiovascular:      Rate and Rhythm: Normal rate.      Pulses: Normal pulses.   Pulmonary:      Effort: Pulmonary effort is normal. No respiratory distress.   Abdominal:      General: Bowel sounds are normal. There is distension.      Palpations: Abdomen is soft.      Tenderness: There is no abdominal tenderness.      Comments: Moderate ascites.   Musculoskeletal:      Cervical back: Normal range of motion.      Comments: Sarcopenia   Skin:     Comments: Trace ankle edema.  Erythema lower extremities is improving.   Neurological:      General: No focal deficit present.      Mental Status: She is alert and oriented to person, place, and time.   Psychiatric:         Mood and Affect: Mood normal.         Behavior: Behavior normal.         Lab  Lab Results   Component Value Date    WBC 9.40 10/06/2023    HGB 10.6 (L) 10/06/2023    HGB 11.2 (L) 10/05/2023    HGB 11.7 (L) 10/04/2023    MCV 94.3 10/06/2023     10/06/2023    INR 1.19 (H) 10/03/2023    INR 1.1 03/07/2023       Lab Results   Component Value Date    GLUCOSE 74 10/06/2023    BUN 30 (H) 10/06/2023    CREATININE 2.39 (H) 10/06/2023    EGFRIFNONA 64 03/24/2020    BCR 12.6 10/06/2023     (L) 10/06/2023    K " 4.5 10/06/2023    CO2 15.0 (L) 10/06/2023    CALCIUM 10.1 10/06/2023    PROTENTOTREF 5.5 (L) 09/25/2023    ALBUMIN 3.6 10/06/2023    ALKPHOS 164 (H) 10/06/2023    BILITOT 0.9 10/06/2023    ALT 15 10/06/2023    AST 40 (H) 10/06/2023       Assessment:    1.  SIMS cirrhosis. MELD-Na = 23 a few days ago.  No recent labs to recheck.   2.  Ascites, reaccumulating.  Status post 3.3 L paracentesis on 10/4/23.  No evidence for SBP.  3.  Hepatic hydrothorax.  4.  Mild hepatic encephalopathy, currently improved.   5.  Hepatorenal syndrome.  6.  Severe deconditioning.  7.  Sarcopenia.  8.  Overall is actively declining.    Plan:    >> Patient is currently declining most medications and has discontinued labs.  >> Agree with plans for hospice.  >> Recommend paracentesis before discharge.  >> If need for recurrent paracentesis, consider peritoneal drainage catheter.    Reviewed above and discussed with patient's daughter, present at bedside.    Will sign off.  Please call with questions or concerns.    Mark I. Brunner, MD  10/08/23  17:00 EDT

## 2023-10-08 NOTE — PROGRESS NOTES
[facility] Medicine Services  PROGRESS NOTE    Patient Name: Laura Hui  : 1950  MRN: [id number]    Date of Admission: 10/3/2023  Primary Care Physician: Carmella Barboza DO    Subjective   Subjective     CC:  Cirrhosis    HPI:  No acute events overnight.  Sleeping well this morning.  Family member at the bedside.      Objective   Objective     Vital Signs:   Temp:  [97.6 øF (36.4 øC)] 97.6 øF (36.4 øC)  Heart Rate:  [101-106] 106  Resp:  [20] 20  BP: (108-118)/(63-65) 108/63  Flow (L/min):  [2-3.5] 3.5     Physical Exam:  Constitutional: Sleeping comfortably, no acute distress  Respiratory: Clear to auscultation bilaterally, respiratory effort normal   Cardiovascular: RRR, no murmurs, rubs, or gallops  Gastrointestinal: Positive bowel sounds, distended, nontender  Musculoskeletal: No bilateral ankle edema  Skin: No rashes      Results Reviewed:  LAB RESULTS:      Lab 10/06/23  0346 10/05/23  0339 10/04/23  0453 10/03/23  1804 10/03/23  1720   WBC 9.40 9.13 9.18  --  10.93*   HEMOGLOBIN 10.6* 11.2* 11.7*  --  14.6   HEMATOCRIT 31.3* 32.7* 34.3  --  42.2   PLATELETS 140 127* 143  --  213   NEUTROS ABS  --   --  7.52*  --  9.28*   IMMATURE GRANS (ABS)  --   --  0.04  --  0.05   LYMPHS ABS  --   --  0.69*  --  0.77   MONOS ABS  --   --  0.88  --  0.80   EOS ABS  --   --  0.03  --  0.02   MCV 94.3 95.3 94.5  --  94.4   PROCALCITONIN  --   --   --   --  0.97*   LACTATE  --   --   --  1.9  --    PROTIME  --   --   --   --  15.2*   D DIMER QUANT  --   --   --   --  2.84*         Lab 10/06/23  0346 10/05/23  0339 10/04/23  0453 10/03/23  2222 10/03/23  1720   SODIUM 129* 127* 127*  --  128*   POTASSIUM 4.5 4.6 4.5  --  5.0   CHLORIDE 95* 95* 96*  --  96*   CO2 15.0* 17.0* 16.0*  --  19.0*   ANION GAP 19.0* 15.0 15.0  --  13.0   BUN 30* 24* 20  --  21   CREATININE 2.39* 2.03* 1.59*  --  1.41*   EGFR 21.0* 25.5* 34.2*  --  39.5*   GLUCOSE 74 77 69  --  91   CALCIUM 10.1 9.1 8.7  --   9.1   MAGNESIUM  --   --   --  1.6 2.0   PHOSPHORUS 3.7  --   --   --   --    TSH  --   --   --   --  4.920*         Lab 10/06/23  0346 10/05/23  0339 10/04/23  0453 10/03/23  1720   TOTAL PROTEIN 5.5* 4.6* 4.7* 5.5*   ALBUMIN 3.6 2.7* 2.3* 2.3*   GLOBULIN 1.9 1.9 2.4 3.2   ALT (SGPT) 15 18 19 17   AST (SGOT) 40* 51* 55* 71*   BILIRUBIN 0.9 0.8 0.9 1.1   ALK PHOS 164* 180* 194* 262*         Lab 10/03/23  2222 10/03/23  1950/23  1720   PROBNP  --   --  10,567.0*   HSTROP T 77* 74* 80*   PROTIME  --   --  15.2*   INR  --   --  1.19*             Lab 10/04/23  0453   IRON 50   IRON SATURATION (TSAT) 35   TIBC 143*   TRANSFERRIN 96*         Lab 10/03/23  2328   PH, ARTERIAL 7.454*   PCO2, ARTERIAL 25.7*   PO2 ART 82.6*   FIO2 32   HCO3 ART 18.0*   BASE EXCESS ART -4.4*   CARBOXYHEMOGLOBIN 0.9     Brief Urine Lab Results  (Last result in the past 365 days)        Color   Clarity   Blood   Leuk Est   Nitrite   Protein   CREAT   Urine HCG        10/05/23 1322 Yellow   Clear   Negative   Negative   Negative   Negative                   Microbiology Results Abnormal       Procedure Component Value - Date/Time    Blood Culture - Blood, Hand, Left [[id number]]  (Normal) Collected: 10/03/23 1804    Lab Status: Preliminary result Specimen: Blood from Hand, Left Updated: 10/07/23 1815     Blood Culture No growth at 4 days    Blood Culture - Blood, Arm, Right [[id number]]  (Normal) Collected: 10/03/23 1720    Lab Status: Preliminary result Specimen: Blood from Arm, Right Updated: 10/07/23 1815     Blood Culture No growth at 4 days    MRSA Screen, PCR (Inpatient) - Swab, Nares [[id number]]  (Normal) Collected: 10/03/23 2327    Lab Status: Final result Specimen: Swab from Nares Updated: 10/04/23 0811     MRSA PCR Negative    Narrative:      The negative predictive value of this diagnostic test is high and should only be used to consider de-escalating anti-MRSA therapy. A positive result may indicate colonization with MRSA and must  be correlated clinically.  MRSA Negative    Respiratory Panel PCR w/COVID-19(SARS-CoV-2) JEN/BETSY/ELIEZER/PAD/COR/MAD/URSULA In-House, NP Swab in UTM/VTM, 3-4 HR TAT - Swab, Nasopharynx [[id number]]  (Normal) Collected: 10/03/23 1726    Lab Status: Final result Specimen: Swab from Nasopharynx Updated: 10/03/23 2207     ADENOVIRUS, PCR Not Detected     Coronavirus 229E Not Detected     Coronavirus HKU1 Not Detected     Coronavirus NL63 Not Detected     Coronavirus OC43 Not Detected     COVID19 Not Detected     Human Metapneumovirus Not Detected     Human Rhinovirus/Enterovirus Not Detected     Influenza A PCR Not Detected     Influenza B PCR Not Detected     Parainfluenza Virus 1 Not Detected     Parainfluenza Virus 2 Not Detected     Parainfluenza Virus 3 Not Detected     Parainfluenza Virus 4 Not Detected     RSV, PCR Not Detected     Bordetella pertussis pcr Not Detected     Bordetella parapertussis PCR Not Detected     Chlamydophila pneumoniae PCR Not Detected     Mycoplasma pneumo by PCR Not Detected    Narrative:      In the setting of a positive respiratory panel with a viral infection PLUS a negative procalcitonin without other underlying concern for bacterial infection, consider observing off antibiotics or discontinuation of antibiotics and continue supportive care. If the respiratory panel is positive for atypical bacterial infection (Bordetella pertussis, Chlamydophila pneumoniae, or Mycoplasma pneumoniae), consider antibiotic de-escalation to target atypical bacterial infection.            No radiology results from the last 24 hrs        Current medications:  Scheduled Meds:aspirin, 81 mg, Oral, Daily  atorvastatin, 40 mg, Oral, Nightly  budesonide-formoterol, 2 puff, Inhalation, BID - RT   And  tiotropium bromide monohydrate, 2 puff, Inhalation, Daily - RT  citalopram, 20 mg, Oral, Daily  heparin (porcine), 5,000 Units, Subcutaneous, Q12H  metoprolol tartrate, 12.5 mg, Oral, BID With Meals  midodrine, 10 mg,  Oral, TID AC  pantoprazole, 40 mg, Oral, Daily  rifAXIMin, 550 mg, Oral, Q12H  roflumilast, 250 mcg, Oral, Daily  saccharomyces boulardii, 250 mg, Oral, BID  senna-docusate sodium, 2 tablet, Oral, BID  sodium chloride, 10 mL, Intravenous, Q12H  traZODone, 150 mg, Oral, Nightly      Continuous Infusions:   PRN Meds:.  acetaminophen    senna-docusate sodium **AND** polyethylene glycol **AND** bisacodyl **AND** bisacodyl    Calcium Replacement - Follow Nurse / BPA Driven Protocol    HYDROmorphone    hydrOXYzine    Magnesium Standard Dose Replacement - Follow Nurse / BPA Driven Protocol    melatonin    oxyCODONE    Phosphorus Replacement - Follow Nurse / BPA Driven Protocol    Potassium Replacement - Follow Nurse / BPA Driven Protocol    sodium chloride    sodium chloride    Assessment & Plan   Assessment & Plan     Active Hospital Problems    Diagnosis  POA    **Acute respiratory failure with hypoxia [J96.01]  Yes    Severe malnutrition [E43]  Yes    CHF (congestive heart failure) [I50.9]  Yes    Anemia [D64.9]  Yes    Anxiety and depression [F41.9, F32.A]  Yes    Chronic back pain [M54.9, G89.29]  Yes    HEIDY (acute kidney injury) [N17.9]  Yes    Hyponatremia [E87.1]  Yes    Cirrhosis of liver [K74.60]  Yes    Generalized weakness [R53.1]  Yes    Cellulitis of right lower extremity [L03.115]  Yes    GERD (gastroesophageal reflux disease) [K21.9]  Yes    Hypertension [I10]  Yes    Atherosclerosis of native arteries of extremities with intermittent claudication, other extremity [I70.218]  Yes    Chronic obstructive pulmonary disease [J44.9]  Yes    Hyperlipidemia [E78.5]  Yes      Resolved Hospital Problems   No resolved problems to display.        Brief Hospital Course to date:  73 to female here with dyspnea x 3 days and weakness. Probable new diagnosis of cirrhosis with ascites and pleural effusions. CTA chest demonstrates mod left effusion and smaller R effusion; cirrhotic liver with large ascites noted. Liver US  with cirrhosis, ascites and right pleural effusion. Diagnosis/therapeutic paracentesis was performed on 10/4 with 3.3L removed. SAAG consistent with protal HTN. No evidence of SBP.  Creatinine elevated on presentation, noted to be 1.41.  Urine sodium less than 20.  UA negative.  She received albumin and midodrine without improvement.     Goals of Care:  -Hospice following, currently on a comfort focused plan of care.  Trying to decide between home hospice versus inpatient hospice.  -No further lab checks, fingersticks  -Hospice to reevaluate on Monday  - continue pain control with prn oxycodone and iv dilauidid     Left > right pleural effusion  Cirrhosis/ascites (CTA chest) - new finding   Hypoalbuminemia , 2.3   - GI followed during admission  - Palliative care following. Hospice involved, comfort focused plan of care.      HEIDY, suspected hepatorenal syndrome  -Creatinine trending up, no further checks.  - likely hepatorenal. Urine Na < 20. UA negative. S/p albumin and midodrine with no improvement.   - Nephrology followed during admission, received albumin  - [facility] for comfort  - Palliative and hospice following.     Dyspnea and hypoxia   - [place] [facility] [place], but had echo showing EF 75%; diuretic increased   - CTA chest with no PE, large left pleural effusion and moderate right pleural effusion, atelectasis  - Improvement in resp status following paracentesis.   - Initially on doxy/rocephin, discontinued due to low suspicion for pneumonia     Possible HFpEF  HTN HLD  - recent [place] with [place] [facility] [place]  - Currently holding bumex due to HEIDY  - Hold metoprolol due to hypotension   - continue statin     Acute onset weakness  - until a couple weeks ago she could walk without a cane or walker  - now using a wheelchair     Lightheadedness  Borderline hypotension  - holding metoprolol and bumex. Continue albumin and midodrine      COPD, emphysema   - no on supplemental oxygen at  home  - former smoker, now vapes. Encouraged cessation  - continue with inhalers      Hyponatremia hypervolemic   - new finding.   - cortisol appropriate. TSH mildly elevated at 4.9, T4 normal.      PVD  - incidental finding of atherosclerosis of left subclavian artery, suggesting high-grade stenosis, or possibly occlusion.   - consider vascular input vs outpatient w.u     Generalized weakness  - PT/OT     Anemia  - H/H stable and at baseline     GERD  - PPI     Anxiety/depression  - Celexa, Trazodone, consider holding pending progression of hyponatremia     Expected Discharge Location and Transportation: TBD  Expected Discharge   Expected Discharge Date: 10/9/2023; Expected Discharge Time:      DVT prophylaxis:  Medical DVT prophylaxis orders are present.     AM-PAC 6 Clicks Score (PT): 11 (10/08/23 0100)    CODE STATUS:   Code Status and Medical Interventions:   Ordered at: 10/05/23 1537     Medical Intervention Limits:    NO intubation (DNI)    NO cardioversion    NO dialysis    NO artificial nutrition     Level Of Support Discussed With:    Patient    Next of Kin (If No Surrogate)     Code Status (Patient has no pulse and is not breathing):    No CPR (Do Not Attempt to Resuscitate)     Medical Interventions (Patient has pulse or is breathing):    Limited Support     I have prepared this progress note with copied portions of the prior day's progress note of my own authorship to preserve accuracy and maintain consistency of documentation. I have reviewed these portions and edited them for correctness. I verify that the above documentation accurately and truly represents the evaluation and management performed on today's date.       Lily Patel MD  10/08/23

## 2023-10-08 NOTE — PLAN OF CARE
Problem: Adult Inpatient Plan of Care  Goal: Plan of Care Review  Outcome: Ongoing, Progressing  Goal: Patient-Specific Goal (Individualized)  Outcome: Ongoing, Progressing  Goal: Absence of Hospital-Acquired Illness or Injury  Outcome: Ongoing, Progressing  Intervention: Identify and Manage Fall Risk  Recent Flowsheet Documentation  Taken 10/7/2023 2007 by Esther Kent RN  Safety Promotion/Fall Prevention: safety round/check completed  Intervention: Prevent Skin Injury  Recent Flowsheet Documentation  Taken 10/7/2023 2007 by Esther Kent RN  Body Position: weight shifting  Intervention: Prevent and Manage VTE (Venous Thromboembolism) Risk  Recent Flowsheet Documentation  Taken 10/7/2023 2007 by Esther Kent RN  Activity Management: activity minimized  Range of Motion: active ROM (range of motion) encouraged  Intervention: Prevent Infection  Recent Flowsheet Documentation  Taken 10/7/2023 2007 by Esther Kent RN  Infection Prevention: environmental surveillance performed  Goal: Optimal Comfort and Wellbeing  Outcome: Ongoing, Progressing  Intervention: Provide Person-Centered Care  Recent Flowsheet Documentation  Taken 10/7/2023 2007 by Esther Kent RN  Trust Relationship/Rapport:   care explained   choices provided   questions answered   thoughts/feelings acknowledged  Goal: Readiness for Transition of Care  Outcome: Ongoing, Progressing     Problem: Fall Injury Risk  Goal: Absence of Fall and Fall-Related Injury  Outcome: Ongoing, Progressing  Intervention: Promote Injury-Free Environment  Recent Flowsheet Documentation  Taken 10/7/2023 2007 by Esther Kent RN  Safety Promotion/Fall Prevention: safety round/check completed     Problem: Skin Injury Risk Increased  Goal: Skin Health and Integrity  Outcome: Ongoing, Progressing  Intervention: Optimize Skin Protection  Recent Flowsheet Documentation  Taken 10/7/2023 2007 by Esther Kent RN  Head of Bed (HOB) Positioning: HOB elevated      Problem: Palliative Care  Goal: Enhanced Quality of Life  Outcome: Ongoing, Progressing   Goal Outcome Evaluation:      Patient continuing to refuse medications and now refusing for staff to obtain vital signs  Incontinence care provided   A&O x4  3.5 L via nasal cannula   No tele

## 2023-10-09 PROCEDURE — 25010000002 HEPARIN (PORCINE) PER 1000 UNITS: Performed by: PHYSICIAN ASSISTANT

## 2023-10-09 PROCEDURE — 94799 UNLISTED PULMONARY SVC/PX: CPT

## 2023-10-09 PROCEDURE — 25010000002 HYDROMORPHONE PER 4 MG: Performed by: INTERNAL MEDICINE

## 2023-10-09 RX ORDER — OXYCODONE HYDROCHLORIDE 5 MG/1
5 TABLET ORAL EVERY 8 HOURS
Status: DISCONTINUED | OUTPATIENT
Start: 2023-10-09 | End: 2023-10-13 | Stop reason: HOSPADM

## 2023-10-09 RX ORDER — ONDANSETRON 2 MG/ML
4 INJECTION INTRAMUSCULAR; INTRAVENOUS EVERY 6 HOURS PRN
Status: DISCONTINUED | OUTPATIENT
Start: 2023-10-09 | End: 2023-10-13 | Stop reason: HOSPADM

## 2023-10-09 RX ADMIN — Medication 12.5 MG: at 08:04

## 2023-10-09 RX ADMIN — CITALOPRAM HYDROBROMIDE 20 MG: 20 TABLET ORAL at 08:04

## 2023-10-09 RX ADMIN — RIFAXIMIN 550 MG: 550 TABLET ORAL at 08:22

## 2023-10-09 RX ADMIN — HYDROMORPHONE HYDROCHLORIDE 0.25 MG: 1 INJECTION, SOLUTION INTRAMUSCULAR; INTRAVENOUS; SUBCUTANEOUS at 08:02

## 2023-10-09 RX ADMIN — BUDESONIDE AND FORMOTEROL FUMARATE DIHYDRATE 2 PUFF: 160; 4.5 AEROSOL RESPIRATORY (INHALATION) at 08:21

## 2023-10-09 RX ADMIN — RIFAXIMIN 550 MG: 550 TABLET ORAL at 23:41

## 2023-10-09 RX ADMIN — PANTOPRAZOLE SODIUM 40 MG: 40 TABLET, DELAYED RELEASE ORAL at 08:04

## 2023-10-09 RX ADMIN — OXYCODONE HYDROCHLORIDE 5 MG: 5 TABLET ORAL at 00:50

## 2023-10-09 RX ADMIN — Medication 250 MG: at 22:43

## 2023-10-09 RX ADMIN — ROFLUMILAST 250 MCG: 500 TABLET ORAL at 08:22

## 2023-10-09 RX ADMIN — ASPIRIN 81 MG CHEWABLE TABLET 81 MG: 81 TABLET CHEWABLE at 08:17

## 2023-10-09 RX ADMIN — TIOTROPIUM BROMIDE INHALATION SPRAY 2 PUFF: 3.12 SPRAY, METERED RESPIRATORY (INHALATION) at 08:21

## 2023-10-09 RX ADMIN — HYDROMORPHONE HYDROCHLORIDE 0.25 MG: 1 INJECTION, SOLUTION INTRAMUSCULAR; INTRAVENOUS; SUBCUTANEOUS at 03:54

## 2023-10-09 RX ADMIN — ATORVASTATIN CALCIUM 40 MG: 40 TABLET, FILM COATED ORAL at 22:43

## 2023-10-09 RX ADMIN — MIDODRINE HYDROCHLORIDE 10 MG: 10 TABLET ORAL at 17:23

## 2023-10-09 RX ADMIN — OXYCODONE HYDROCHLORIDE 5 MG: 5 TABLET ORAL at 12:48

## 2023-10-09 RX ADMIN — HYDROXYZINE HYDROCHLORIDE 25 MG: 25 TABLET, FILM COATED ORAL at 08:04

## 2023-10-09 RX ADMIN — TRAZODONE HYDROCHLORIDE 150 MG: 50 TABLET ORAL at 22:43

## 2023-10-09 RX ADMIN — Medication 250 MG: at 08:23

## 2023-10-09 RX ADMIN — HEPARIN SODIUM 5000 UNITS: 5000 INJECTION, SOLUTION INTRAVENOUS; SUBCUTANEOUS at 08:18

## 2023-10-09 RX ADMIN — OXYCODONE HYDROCHLORIDE 5 MG: 5 TABLET ORAL at 17:23

## 2023-10-09 RX ADMIN — HEPARIN SODIUM 5000 UNITS: 5000 INJECTION, SOLUTION INTRAVENOUS; SUBCUTANEOUS at 22:43

## 2023-10-09 RX ADMIN — MIDODRINE HYDROCHLORIDE 10 MG: 10 TABLET ORAL at 12:48

## 2023-10-09 RX ADMIN — OXYCODONE HYDROCHLORIDE 5 MG: 5 TABLET ORAL at 05:37

## 2023-10-09 RX ADMIN — Medication 10 ML: at 08:04

## 2023-10-09 RX ADMIN — Medication 12.5 MG: at 17:23

## 2023-10-09 RX ADMIN — MIDODRINE HYDROCHLORIDE 10 MG: 10 TABLET ORAL at 08:04

## 2023-10-09 NOTE — PLAN OF CARE
Problem: Adult Inpatient Plan of Care  Goal: Plan of Care Review  Outcome: Ongoing, Progressing  Goal: Patient-Specific Goal (Individualized)  Outcome: Ongoing, Progressing  Goal: Absence of Hospital-Acquired Illness or Injury  Outcome: Ongoing, Progressing  Intervention: Identify and Manage Fall Risk  Recent Flowsheet Documentation  Taken 10/9/2023 0600 by Jake Mackay RN  Safety Promotion/Fall Prevention:   clutter free environment maintained   assistive device/personal items within reach   safety round/check completed  Taken 10/9/2023 0400 by Jake Mackay RN  Safety Promotion/Fall Prevention:   assistive device/personal items within reach   clutter free environment maintained   safety round/check completed  Taken 10/9/2023 0200 by Jake Mackay RN  Safety Promotion/Fall Prevention:   clutter free environment maintained   assistive device/personal items within reach   safety round/check completed  Taken 10/9/2023 0000 by Jake Mackay RN  Safety Promotion/Fall Prevention:   assistive device/personal items within reach   clutter free environment maintained   safety round/check completed  Taken 10/8/2023 2200 by Jake Mackay RN  Safety Promotion/Fall Prevention:   safety round/check completed   clutter free environment maintained   assistive device/personal items within reach  Taken 10/8/2023 2000 by Jake Mackay RN  Safety Promotion/Fall Prevention:   safety round/check completed   clutter free environment maintained   assistive device/personal items within reach  Intervention: Prevent Skin Injury  Recent Flowsheet Documentation  Taken 10/9/2023 0600 by Jake Mackay RN  Body Position: position changed independently  Skin Protection: adhesive use limited  Taken 10/9/2023 0400 by Jake Mackay RN  Body Position: position changed independently  Skin Protection: adhesive use limited  Taken 10/9/2023 0200 by Jake Mackay RN  Body Position: position changed independently  Skin  Protection: adhesive use limited  Taken 10/9/2023 0000 by Jake Mackay RN  Body Position: position changed independently  Skin Protection: adhesive use limited  Taken 10/8/2023 2200 by Jake Mackay RN  Body Position: position changed independently  Skin Protection: adhesive use limited  Taken 10/8/2023 2000 by Jake Mackay RN  Body Position: position changed independently  Skin Protection: adhesive use limited  Intervention: Prevent and Manage VTE (Venous Thromboembolism) Risk  Recent Flowsheet Documentation  Taken 10/9/2023 0600 by Jake Mackay RN  Activity Management: activity minimized  Taken 10/9/2023 0400 by Jake Mackay RN  Activity Management: activity minimized  Taken 10/9/2023 0200 by Jake Mackay RN  Activity Management: activity encouraged  Taken 10/9/2023 0000 by Jake Mackay RN  Activity Management: activity encouraged  Taken 10/8/2023 2200 by Jake Mackay RN  Activity Management: activity encouraged  Taken 10/8/2023 2000 by Jake Mackay RN  Activity Management: activity encouraged  Range of Motion: active ROM (range of motion) encouraged  Intervention: Prevent Infection  Recent Flowsheet Documentation  Taken 10/9/2023 0600 by Jake Mackay RN  Infection Prevention:   environmental surveillance performed   hand hygiene promoted   rest/sleep promoted  Taken 10/9/2023 0400 by Jake Mackay RN  Infection Prevention:   environmental surveillance performed   hand hygiene promoted   rest/sleep promoted  Taken 10/9/2023 0200 by Jake Mackay RN  Infection Prevention:   environmental surveillance performed   hand hygiene promoted   rest/sleep promoted  Taken 10/9/2023 0000 by Jake Mackay RN  Infection Prevention:   environmental surveillance performed   hand hygiene promoted   rest/sleep promoted  Taken 10/8/2023 2200 by Jake Mackay RN  Infection Prevention:   environmental surveillance performed   hand hygiene promoted   rest/sleep promoted  Taken  10/8/2023 2000 by Jake Mackay RN  Infection Prevention:   environmental surveillance performed   hand hygiene promoted   rest/sleep promoted  Goal: Optimal Comfort and Wellbeing  Outcome: Ongoing, Progressing  Intervention: Monitor Pain and Promote Comfort  Recent Flowsheet Documentation  Taken 10/9/2023 0000 by Jake Mackay RN  Pain Management Interventions: see MAR  Intervention: Provide Person-Centered Care  Recent Flowsheet Documentation  Taken 10/9/2023 0600 by Jake Mackay RN  Trust Relationship/Rapport:   emotional support provided   thoughts/feelings acknowledged  Taken 10/8/2023 2000 by Jake Mackay RN  Trust Relationship/Rapport: care explained  Goal: Readiness for Transition of Care  Outcome: Ongoing, Progressing     Problem: Fall Injury Risk  Goal: Absence of Fall and Fall-Related Injury  Outcome: Ongoing, Progressing  Intervention: Identify and Manage Contributors  Recent Flowsheet Documentation  Taken 10/9/2023 0600 by Jake Mackay RN  Self-Care Promotion: independence encouraged  Taken 10/9/2023 0400 by Jake Mackay RN  Self-Care Promotion: independence encouraged  Taken 10/9/2023 0200 by Jake Mackay RN  Self-Care Promotion: independence encouraged  Taken 10/9/2023 0000 by Jake Mackay RN  Self-Care Promotion: independence encouraged  Taken 10/8/2023 2200 by Jake Mackay RN  Self-Care Promotion: independence encouraged  Taken 10/8/2023 2000 by Jake Mackay RN  Self-Care Promotion: independence encouraged  Intervention: Promote Injury-Free Environment  Recent Flowsheet Documentation  Taken 10/9/2023 0600 by Jake Mackay RN  Safety Promotion/Fall Prevention:   clutter free environment maintained   assistive device/personal items within reach   safety round/check completed  Taken 10/9/2023 0400 by Jake Mackay RN  Safety Promotion/Fall Prevention:   assistive device/personal items within reach   clutter free environment maintained   safety round/check  completed  Taken 10/9/2023 0200 by Jake Mackay RN  Safety Promotion/Fall Prevention:   clutter free environment maintained   assistive device/personal items within reach   safety round/check completed  Taken 10/9/2023 0000 by Jake Mackay RN  Safety Promotion/Fall Prevention:   assistive device/personal items within reach   clutter free environment maintained   safety round/check completed  Taken 10/8/2023 2200 by Jake Mackay RN  Safety Promotion/Fall Prevention:   safety round/check completed   clutter free environment maintained   assistive device/personal items within reach  Taken 10/8/2023 2000 by Jake Mackay RN  Safety Promotion/Fall Prevention:   safety round/check completed   clutter free environment maintained   assistive device/personal items within reach     Problem: Skin Injury Risk Increased  Goal: Skin Health and Integrity  Outcome: Ongoing, Progressing  Intervention: Optimize Skin Protection  Recent Flowsheet Documentation  Taken 10/9/2023 0600 by Jake Mackay RN  Pressure Reduction Techniques: frequent weight shift encouraged  Head of Bed (HOB) Positioning: HOB elevated  Pressure Reduction Devices: positioning supports utilized  Skin Protection: adhesive use limited  Taken 10/9/2023 0400 by Jake Mackay RN  Pressure Reduction Techniques: frequent weight shift encouraged  Head of Bed (HOB) Positioning: HOB elevated  Pressure Reduction Devices: positioning supports utilized  Skin Protection: adhesive use limited  Taken 10/9/2023 0200 by Jake Mackay RN  Pressure Reduction Techniques: frequent weight shift encouraged  Head of Bed (HOB) Positioning: HOB elevated  Pressure Reduction Devices: positioning supports utilized  Skin Protection: adhesive use limited  Taken 10/9/2023 0000 by Jake Mackay RN  Pressure Reduction Techniques: frequent weight shift encouraged  Head of Bed (HOB) Positioning: HOB elevated  Pressure Reduction Devices: positioning supports utilized  Skin  Protection: adhesive use limited  Taken 10/8/2023 2200 by Jake Mackay RN  Pressure Reduction Techniques: frequent weight shift encouraged  Head of Bed (HOB) Positioning: HOB elevated  Pressure Reduction Devices: positioning supports utilized  Skin Protection: adhesive use limited  Taken 10/8/2023 2000 by Jake Mackay RN  Pressure Reduction Techniques: frequent weight shift encouraged  Head of Bed (HOB) Positioning: HOB elevated  Pressure Reduction Devices: positioning supports utilized  Skin Protection: adhesive use limited     Problem: Palliative Care  Goal: Enhanced Quality of Life  Outcome: Ongoing, Progressing  Intervention: Maximize Comfort  Recent Flowsheet Documentation  Taken 10/9/2023 0000 by Jake Mackay RN  Pain Management Interventions: see MAR  Intervention: Optimize Function  Recent Flowsheet Documentation  Taken 10/9/2023 0600 by Jake Mackay RN  Sleep/Rest Enhancement: relaxation techniques promoted  Taken 10/9/2023 0400 by Jake Mackay RN  Sleep/Rest Enhancement: relaxation techniques promoted  Taken 10/9/2023 0200 by Jake Mackay RN  Sleep/Rest Enhancement: relaxation techniques promoted  Taken 10/9/2023 0000 by Jake Mackay RN  Sleep/Rest Enhancement: relaxation techniques promoted  Taken 10/8/2023 2200 by Jake Mackay RN  Sleep/Rest Enhancement: relaxation techniques promoted  Taken 10/8/2023 2000 by Jake Mackay RN  Sleep/Rest Enhancement: relaxation techniques promoted  Intervention: Optimize Psychosocial Wellbeing  Recent Flowsheet Documentation  Taken 10/9/2023 0600 by Jake Mackay RN  Supportive Measures: active listening utilized  Taken 10/8/2023 2000 by Jake Mackay RN  Supportive Measures: active listening utilized  Family/Support System Care: support provided   Goal Outcome Evaluation:

## 2023-10-09 NOTE — PROGRESS NOTES
"Palliative Care Daily Progress Note     C/C: Patient reporting abdominal pain.     S: Medical record reviewed. Follow up visit for GOC in the context of complex medical decision making. Events noted. Daughter and patient electing comfort measures. Patient reports abdominal pain that increases with turning in bed. Patient requiring Hydromorphone 0.25mg IV x4 doses, and Oxycodone 5mg PO x 5 doses for total 57.5 MME's in the last 24 hours. RN reports patient moaning with all personal care and bed mobility. Decreased intake. Patient drowsy, falling asleep and limited endurance to talk.    ROS: +pain, abdominal, constant ache, reports pain controlled with recent Oxycodone. +nausea/decreased appetite, patient reports smell of food makes her sick. ROS limited by drowsiness.     O: Code Status:   Code Status and Medical Interventions:   Ordered at: 10/05/23 1536     Medical Intervention Limits:    NO intubation (DNI)    NO cardioversion    NO dialysis    NO artificial nutrition     Level Of Support Discussed With:    Patient    Next of Kin (If No Surrogate)     Code Status (Patient has no pulse and is not breathing):    No CPR (Do Not Attempt to Resuscitate)     Medical Interventions (Patient has pulse or is breathing):    Limited Support      Advanced Directives: Advance Directive Status: Patient does not have advance directive   Goals of Care: Ongoing.   Palliative Performance Scale Score: 30%     /70 (BP Location: Left arm, Patient Position: Lying)   Pulse 101   Temp 98.2 øF (36.8 øC) (Oral)   Resp 18   Ht 165.1 cm (65\")   Wt 82.1 kg (181 lb)   SpO2 95%   BMI 30.12 kg/mý     Intake/Output Summary (Last 24 hours) at 10/9/2023 1505  Last data filed at 10/9/2023 0555  Gross per 24 hour   Intake 25 ml   Output 450 ml   Net -425 ml       PE:  General Appearance:    Patient laying in bed, drowsy, awake, A/C ill appearing, cooperative, NAD   HEENT:    NC/AT, EOMI, anicteric, MMM, face relaxed, NC in place   Neck:   " supple, trachea midline, no JVD   Lungs:     CTA bilat, diminished in bases; respirations regular, even and unlabored; RR 16-18 on exam, 2LNC    Heart:    RRR, normal S1 and S2, no M/R/G,  on monitor   Abdomen:     Normal bowel sounds, soft, tender, distended   G/U:   Deferred   MSK/Extremities:  Bruising to upper extremities, no edema   Pulses:   Pulses palpable and equal bilaterally   Skin:   Warm, dry   Neurologic:   Drowsy, Ox3, cooperative, YOUNGER   Psych:   Calm, appropriate     Meds: Reviewed and changes noted    Labs:   Results from last 7 days   Lab Units 10/06/23  0346   WBC 10*3/mm3 9.40   HEMOGLOBIN g/dL 10.6*   HEMATOCRIT % 31.3*   PLATELETS 10*3/mm3 140     Results from last 7 days   Lab Units 10/06/23  0346   SODIUM mmol/L 129*   POTASSIUM mmol/L 4.5   CHLORIDE mmol/L 95*   CO2 mmol/L 15.0*   BUN mg/dL 30*   CREATININE mg/dL 2.39*   GLUCOSE mg/dL 74   CALCIUM mg/dL 10.1     Results from last 7 days   Lab Units 10/06/23  0346   SODIUM mmol/L 129*   POTASSIUM mmol/L 4.5   CHLORIDE mmol/L 95*   CO2 mmol/L 15.0*   BUN mg/dL 30*   CREATININE mg/dL 2.39*   CALCIUM mg/dL 10.1   BILIRUBIN mg/dL 0.9   ALK PHOS U/L 164*   ALT (SGPT) U/L 15   AST (SGOT) U/L 40*   GLUCOSE mg/dL 74     Imaging Results (Last 72 Hours)       ** No results found for the last 72 hours. **                  Diagnostics: Reviewed    A:   Acute respiratory failure with hypoxia    Atherosclerosis of native arteries of extremities with intermittent claudication, other extremity    GERD (gastroesophageal reflux disease)    Hyperlipidemia    Hypertension    Chronic obstructive pulmonary disease    CHF (congestive heart failure)    Anemia    Anxiety and depression    Chronic back pain    HEIDY (acute kidney injury)    Hyponatremia    Cirrhosis of liver    Generalized weakness    Cellulitis of right lower extremity    Severe malnutrition     73 y.o. female with SIMS cirrhosis with ascites, HEIDY, malnutrition.   S/Sx:  1. Pain -secondary to  ascites  -scheduled Oxycodone 5mg PO q 8 hours and continue q4 hours prn pain  -continue Hydromorphone 0.25mg IV q 2 hours prn pain    2. Nausea -started Zofran 4mg IV q 6 hours prn N/V    3. Debility -PT/OT following    4. GOC -DNR/Comfort Measures -per discussion with patient and daughter at bedside  -reviewed comfort measures including discontinuation of labs, IV fluids; focus on symptom management   -hospice following    P: Follow up visit for GOC and symptom management. Patient and daughter, Mary, confirming comfort focused plan of care with election of comfort measures. They are concerned about pain management. Reviewed patient's medications and scheduled Oxycodone. If patient is declining, may require adjustment to only IV medications for symptom management if patient unable to swallow pills, so will continue to follow closely.   Palliative Care Team will continue to follow patient. Please do not hesitate to contact us regarding further sx mgmt or GOC needs.  Chandni Sanders, APRN  10/9/2023  Time spent: 35 minutes

## 2023-10-09 NOTE — PROGRESS NOTES
Continued Stay Note  [facility]     Patient Name: Laura Hui  MRN: [id number]  Today's Date: 10/9/2023    Admit Date: 10/3/2023    Plan: To be determined   Discharge Plan       Row Name 10/09/23 5640       Plan    Plan Comments Case management is following if there are any casemanagement needs.      Row Name 10/09/23 1420       Plan    Plan To be determined    Plan Comments Visit made to pt, pt's daughter Mary present. Daughter  asked about inpatient hospice. Teaching done on inpatient hospice, informed of the medicare criteria that pt has to have unmanaged symptoms, i.e pain, dyspnea, agitation which requires injectable medications and the pt's current level of care needs are unable to be provided outside the hospital. At this time pt is taking oral medications, does take both oral oxycodone as well as IV dilaudid. Spoke with Chandni MI, palliative care team, informed of pt taking both oral and IV pain medication, Chandni stated will follow up, informed daughter. Will continue to follow. Please call [phone] if can be of further assistance.                   Discharge Codes    No documentation.                 Expected Discharge Date and Time       Expected Discharge Date Expected Discharge Time    Oct 9, 2023               MOOKIE Tipton

## 2023-10-09 NOTE — PLAN OF CARE
Goal Outcome Evaluation:  Plan of Care Reviewed With: patient, daughter     Problem: Adult Inpatient Plan of Care  Goal: Plan of Care Review  Outcome: Ongoing, Progressing  Flowsheets (Taken 10/9/2023 6381)  Progress: no change  Plan of Care Reviewed With:   patient   daughter     Problem: Adult Inpatient Plan of Care  Goal: Absence of Hospital-Acquired Illness or Injury  Outcome: Ongoing, Progressing     Problem: Adult Inpatient Plan of Care  Goal: Optimal Comfort and Wellbeing  Outcome: Ongoing, Progressing     Problem: Adult Inpatient Plan of Care  Goal: Readiness for Transition of Care  Outcome: Ongoing, Progressing     Problem: Skin Injury Risk Increased  Goal: Skin Health and Integrity  Outcome: Ongoing, Progressing        Progress: no change     Patient continues to c/o abd/back pain with repositioning. She does state relief with prn medication. Providing comfort based care at this time. Daughter at bedside. Will monitor.

## 2023-10-09 NOTE — PROGRESS NOTES
[facility] Medicine Services  PROGRESS NOTE    Patient Name: Laura Hui  : 1950  MRN: [id number]    Date of Admission: 10/3/2023  Primary Care Physician: Carmella Barboza DO    Subjective   Subjective     CC:  Cirrhosis    HPI:  Requesting pain medication for her abdominal pain, otherwise no p.o. intake.      Objective   Objective     Vital Signs:   Temp:  [97.6 øF (36.4 øC)-97.8 øF (36.6 øC)] 97.8 øF (36.6 øC)  Heart Rate:  [] 112  Resp:  [16-20] 20  BP: ()/(56-69) 114/60  Flow (L/min):  [2] 2     Physical Exam:  Constitutional: Drowsy, no acute distress  HENT: Temporal wasting  Respiratory: Clear to auscultation bilaterally, respiratory effort normal   Cardiovascular: RRR  Gastrointestinal: Positive bowel sounds, distended but soft  Musculoskeletal: Lower extremity edema  Psychiatric: Appropriate affect, cooperative  Neurologic: Oriented x 3, no focal deficits  Skin: No rashes      Results Reviewed:  LAB RESULTS:      Lab 10/06/23  0346 10/05/23  0339 10/04/23  0453 10/03/23  1804 10/03/23  1720   WBC 9.40 9.13 9.18  --  10.93*   HEMOGLOBIN 10.6* 11.2* 11.7*  --  14.6   HEMATOCRIT 31.3* 32.7* 34.3  --  42.2   PLATELETS 140 127* 143  --  213   NEUTROS ABS  --   --  7.52*  --  9.28*   IMMATURE GRANS (ABS)  --   --  0.04  --  0.05   LYMPHS ABS  --   --  0.69*  --  0.77   MONOS ABS  --   --  0.88  --  0.80   EOS ABS  --   --  0.03  --  0.02   MCV 94.3 95.3 94.5  --  94.4   PROCALCITONIN  --   --   --   --  0.97*   LACTATE  --   --   --  1.9  --    PROTIME  --   --   --   --  15.2*   D DIMER QUANT  --   --   --   --  2.84*         Lab 10/06/23  0346 10/05/23  0339 10/04/23  0453 10/03/23  2222 10/03/23  1720   SODIUM 129* 127* 127*  --  128*   POTASSIUM 4.5 4.6 4.5  --  5.0   CHLORIDE 95* 95* 96*  --  96*   CO2 15.0* 17.0* 16.0*  --  19.0*   ANION GAP 19.0* 15.0 15.0  --  13.0   BUN 30* 24* 20  --  21   CREATININE 2.39* 2.03* 1.59*  --  1.41*   EGFR 21.0* 25.5*  34.2*  --  39.5*   GLUCOSE 74 77 69  --  91   CALCIUM 10.1 9.1 8.7  --  9.1   MAGNESIUM  --   --   --  1.6 2.0   PHOSPHORUS 3.7  --   --   --   --    TSH  --   --   --   --  4.920*         Lab 10/06/23  0346 10/05/23  0339 10/04/23  0453 10/03/23  1720   TOTAL PROTEIN 5.5* 4.6* 4.7* 5.5*   ALBUMIN 3.6 2.7* 2.3* 2.3*   GLOBULIN 1.9 1.9 2.4 3.2   ALT (SGPT) 15 18 19 17   AST (SGOT) 40* 51* 55* 71*   BILIRUBIN 0.9 0.8 0.9 1.1   ALK PHOS 164* 180* 194* 262*         Lab 10/03/23  2222 10/03/23  1950/23  1720   PROBNP  --   --  10,567.0*   HSTROP T 77* 74* 80*   PROTIME  --   --  15.2*   INR  --   --  1.19*             Lab 10/04/23  0453   IRON 50   IRON SATURATION (TSAT) 35   TIBC 143*   TRANSFERRIN 96*         Lab 10/03/23  2328   PH, ARTERIAL 7.454*   PCO2, ARTERIAL 25.7*   PO2 ART 82.6*   FIO2 32   HCO3 ART 18.0*   BASE EXCESS ART -4.4*   CARBOXYHEMOGLOBIN 0.9     Brief Urine Lab Results  (Last result in the past 365 days)        Color   Clarity   Blood   Leuk Est   Nitrite   Protein   CREAT   Urine HCG        10/05/23 1322 Yellow   Clear   Negative   Negative   Negative   Negative                   Microbiology Results Abnormal       Procedure Component Value - Date/Time    Blood Culture - Blood, Hand, Left [[id number]]  (Normal) Collected: 10/03/23 1804    Lab Status: Final result Specimen: Blood from Hand, Left Updated: 10/08/23 1815     Blood Culture No growth at 5 days    Blood Culture - Blood, Arm, Right [[id number]]  (Normal) Collected: 10/03/23 1720    Lab Status: Final result Specimen: Blood from Arm, Right Updated: 10/08/23 1815     Blood Culture No growth at 5 days    MRSA Screen, PCR (Inpatient) - Swab, Nares [[id number]]  (Normal) Collected: 10/03/23 4555    Lab Status: Final result Specimen: Swab from Nares Updated: 10/04/23 0811     MRSA PCR Negative    Narrative:      The negative predictive value of this diagnostic test is high and should only be used to consider de-escalating anti-MRSA therapy. A  positive result may indicate colonization with MRSA and must be correlated clinically.  MRSA Negative    Respiratory Panel PCR w/COVID-19(SARS-CoV-2) JEN/BETSY/ELIEZER/PAD/COR/MAD/URSULA In-House, NP Swab in UTM/VTM, 3-4 HR TAT - Swab, Nasopharynx [[id number]]  (Normal) Collected: 10/03/23 1726    Lab Status: Final result Specimen: Swab from Nasopharynx Updated: 10/03/23 5385     ADENOVIRUS, PCR Not Detected     Coronavirus 229E Not Detected     Coronavirus HKU1 Not Detected     Coronavirus NL63 Not Detected     Coronavirus OC43 Not Detected     COVID19 Not Detected     Human Metapneumovirus Not Detected     Human Rhinovirus/Enterovirus Not Detected     Influenza A PCR Not Detected     Influenza B PCR Not Detected     Parainfluenza Virus 1 Not Detected     Parainfluenza Virus 2 Not Detected     Parainfluenza Virus 3 Not Detected     Parainfluenza Virus 4 Not Detected     RSV, PCR Not Detected     Bordetella pertussis pcr Not Detected     Bordetella parapertussis PCR Not Detected     Chlamydophila pneumoniae PCR Not Detected     Mycoplasma pneumo by PCR Not Detected    Narrative:      In the setting of a positive respiratory panel with a viral infection PLUS a negative procalcitonin without other underlying concern for bacterial infection, consider observing off antibiotics or discontinuation of antibiotics and continue supportive care. If the respiratory panel is positive for atypical bacterial infection (Bordetella pertussis, Chlamydophila pneumoniae, or Mycoplasma pneumoniae), consider antibiotic de-escalation to target atypical bacterial infection.            No radiology results from the last 24 hrs        Current medications:  Scheduled Meds:aspirin, 81 mg, Oral, Daily  atorvastatin, 40 mg, Oral, Nightly  budesonide-formoterol, 2 puff, Inhalation, BID - RT   And  tiotropium bromide monohydrate, 2 puff, Inhalation, Daily - RT  citalopram, 20 mg, Oral, Daily  heparin (porcine), 5,000 Units, Subcutaneous, Q12H  metoprolol  tartrate, 12.5 mg, Oral, BID With Meals  midodrine, 10 mg, Oral, TID AC  pantoprazole, 40 mg, Oral, Daily  rifAXIMin, 550 mg, Oral, Q12H  roflumilast, 250 mcg, Oral, Daily  saccharomyces boulardii, 250 mg, Oral, BID  senna-docusate sodium, 2 tablet, Oral, BID  sodium chloride, 10 mL, Intravenous, Q12H  traZODone, 150 mg, Oral, Nightly      Continuous Infusions:   PRN Meds:.  acetaminophen    senna-docusate sodium **AND** polyethylene glycol **AND** bisacodyl **AND** bisacodyl    Calcium Replacement - Follow Nurse / BPA Driven Protocol    HYDROmorphone    hydrOXYzine    Magnesium Low Dose Replacement - Follow Nurse / BPA Driven Protocol    melatonin    oxyCODONE    Phosphorus Replacement - Follow Nurse / BPA Driven Protocol    Potassium Replacement - Follow Nurse / BPA Driven Protocol    sodium chloride    sodium chloride    Assessment & Plan   Assessment & Plan     Active Hospital Problems    Diagnosis  POA    **Acute respiratory failure with hypoxia [J96.01]  Yes    Severe malnutrition [E43]  Yes    CHF (congestive heart failure) [I50.9]  Yes    Anemia [D64.9]  Yes    Anxiety and depression [F41.9, F32.A]  Yes    Chronic back pain [M54.9, G89.29]  Yes    HEIDY (acute kidney injury) [N17.9]  Yes    Hyponatremia [E87.1]  Yes    Cirrhosis of liver [K74.60]  Yes    Generalized weakness [R53.1]  Yes    Cellulitis of right lower extremity [L03.115]  Yes    GERD (gastroesophageal reflux disease) [K21.9]  Yes    Hypertension [I10]  Yes    Atherosclerosis of native arteries of extremities with intermittent claudication, other extremity [I70.218]  Yes    Chronic obstructive pulmonary disease [J44.9]  Yes    Hyperlipidemia [E78.5]  Yes      Resolved Hospital Problems   No resolved problems to display.        Brief Hospital Course to date:  73 to female here with dyspnea x 3 days and weakness. Probable new diagnosis of cirrhosis with ascites and pleural effusions. CTA chest demonstrates mod left effusion and smaller R effusion;  cirrhotic liver with large ascites noted. Liver US with cirrhosis, ascites and right pleural effusion. Diagnosis/therapeutic paracentesis was performed on 10/4 with 3.3L removed. SAAG consistent with protal HTN. No evidence of SBP.  Creatinine elevated on presentation, noted to be 1.41.  Urine sodium less than 20.  UA negative.  She received albumin and midodrine without improvement.     Goals of Care:  -Hospice following, currently on a comfort focused plan of care.  Trying to decide between home hospice versus inpatient hospice.  -No further lab checks, fingersticks  -Hospice to reevaluate on Monday  -continue pain control with prn oxycodone and iv dilauidid   -paracentesis before discharge, consider drain placement      Left > right pleural effusion  Cirrhosis/ascites (CTA chest) - new finding   Hypoalbuminemia , 2.3   - GI followed during admission  - Palliative care following. Hospice involved, comfort focused plan of care.      HEIDY, hepatorenal syndrome  -Creatinine trending up, no further checks.  - likely hepatorenal. Urine Na < 20. UA negative. S/p albumin and midodrine with no improvement.   - Nephrology followed during admission, received albumin  - [place] for comfort  - Palliative and hospice following.     Dyspnea and hypoxia   - [place] [facility] [place], but had echo showing EF 75%; diuretic increased   - CTA chest with no PE, large left pleural effusion and moderate right pleural effusion, atelectasis  - Improvement in resp status following paracentesis.   - Initially on doxy/rocephin, discontinued due to low suspicion for pneumonia     Possible HFpEF  HTN HLD  - [place] [facility] [place]  - Currently holding bumex due to HEIDY  - Hold metoprolol due to hypotension   - continue statin     Acute onset weakness  - until a couple weeks ago she could walk without a cane or walker  - now using a wheelchair     Lightheadedness  Borderline hypotension  - holding metoprolol  and bumex. Continue albumin and midodrine      COPD, emphysema   - no on supplemental oxygen at home  - former smoker, now vapes. Encouraged cessation  - continue with inhalers      Hyponatremia hypervolemic   - new finding.   - cortisol appropriate. TSH mildly elevated at 4.9, T4 normal.      PVD  - incidental finding of atherosclerosis of left subclavian artery, suggesting high-grade stenosis, or possibly occlusion.   - consider vascular input vs outpatient w.u     Generalized weakness  - PT/OT     Anemia  - H/H stable and at baseline     GERD  - PPI     Anxiety/depression  - Celexa, Trazodone, consider holding pending progression of hyponatremia     Expected Discharge Location and Transportation: TBD  Expected Discharge   Expected Discharge Date: 10/9/2023; Expected Discharge Time:      DVT prophylaxis:  Medical DVT prophylaxis orders are present.     AM-PAC 6 Clicks Score (PT): 11 (10/08/23 2000)    CODE STATUS:   Code Status and Medical Interventions:   Ordered at: 10/05/23 1534     Medical Intervention Limits:    NO intubation (DNI)    NO cardioversion    NO dialysis    NO artificial nutrition     Level Of Support Discussed With:    Patient    Next of Kin (If No Surrogate)     Code Status (Patient has no pulse and is not breathing):    No CPR (Do Not Attempt to Resuscitate)     Medical Interventions (Patient has pulse or is breathing):    Limited Support     I have prepared this progress note with copied portions of the prior day's progress note of my own authorship to preserve accuracy and maintain consistency of documentation. I have reviewed these portions and edited them for correctness. I verify that the above documentation accurately and truly represents the evaluation and management performed on today's date.       Lily Patel MD  10/09/23

## 2023-10-09 NOTE — PROGRESS NOTES
Continued Stay Note  [facility]     Patient Name: Laura Hui  MRN: [id number]  Today's Date: 10/9/2023    Admit Date: 10/3/2023    Plan: To be determined   Discharge Plan       Row Name 10/09/23 1420       Plan    Plan To be determined    Plan Comments Visit made to pt, pt's daughter Mary present. Daughter  asked about inpatient hospice. Teaching done on inpatient hospice, informed of the medicare criteria that pt has to have unmanaged symptoms, i.e pain, dyspnea, agitation which requires injectable medications and the pt's current level of care needs are unable to be provided outside the hospital. At this time pt is taking oral medications, does take both oral oxycodone as well as IV dilaudid. Spoke with Chandni MI, palliative care team, informed of pt taking both oral and IV pain medication, Chandni stated will follow up, informed daughter. Will continue to follow. Please call [phone] if can be of further assistance.                   Discharge Codes    No documentation.                 Expected Discharge Date and Time       Expected Discharge Date Expected Discharge Time    Oct 9, 2023               Karissa Burden RN

## 2023-10-10 ENCOUNTER — TELEPHONE (OUTPATIENT)
Dept: FAMILY MEDICINE CLINIC | Facility: CLINIC | Age: 73
End: 2023-10-10
Payer: MEDICARE

## 2023-10-10 PROCEDURE — 25010000002 HYDROMORPHONE PER 4 MG: Performed by: INTERNAL MEDICINE

## 2023-10-10 PROCEDURE — 25010000002 HEPARIN (PORCINE) PER 1000 UNITS: Performed by: PHYSICIAN ASSISTANT

## 2023-10-10 PROCEDURE — 25010000002 ONDANSETRON PER 1 MG

## 2023-10-10 RX ORDER — CITALOPRAM 20 MG/1
TABLET ORAL
Qty: 90 TABLET | Refills: 1 | Status: SHIPPED | OUTPATIENT
Start: 2023-10-10

## 2023-10-10 RX ORDER — GLYCOPYRROLATE 0.2 MG/ML
0.4 INJECTION INTRAMUSCULAR; INTRAVENOUS EVERY 4 HOURS PRN
Status: DISCONTINUED | OUTPATIENT
Start: 2023-10-10 | End: 2023-10-13 | Stop reason: HOSPADM

## 2023-10-10 RX ADMIN — RIFAXIMIN 550 MG: 550 TABLET ORAL at 21:47

## 2023-10-10 RX ADMIN — TRAZODONE HYDROCHLORIDE 150 MG: 50 TABLET ORAL at 21:47

## 2023-10-10 RX ADMIN — OXYCODONE HYDROCHLORIDE 5 MG: 5 TABLET ORAL at 12:33

## 2023-10-10 RX ADMIN — Medication 12.5 MG: at 09:47

## 2023-10-10 RX ADMIN — Medication 10 ML: at 21:48

## 2023-10-10 RX ADMIN — Medication 250 MG: at 21:47

## 2023-10-10 RX ADMIN — Medication 250 MG: at 09:48

## 2023-10-10 RX ADMIN — CITALOPRAM HYDROBROMIDE 20 MG: 20 TABLET ORAL at 09:46

## 2023-10-10 RX ADMIN — RIFAXIMIN 550 MG: 550 TABLET ORAL at 09:48

## 2023-10-10 RX ADMIN — MIDODRINE HYDROCHLORIDE 10 MG: 10 TABLET ORAL at 11:51

## 2023-10-10 RX ADMIN — ONDANSETRON 4 MG: 2 INJECTION INTRAMUSCULAR; INTRAVENOUS at 12:33

## 2023-10-10 RX ADMIN — HYDROMORPHONE HYDROCHLORIDE 0.25 MG: 1 INJECTION, SOLUTION INTRAMUSCULAR; INTRAVENOUS; SUBCUTANEOUS at 11:51

## 2023-10-10 RX ADMIN — HYDROXYZINE HYDROCHLORIDE 25 MG: 25 TABLET, FILM COATED ORAL at 11:51

## 2023-10-10 RX ADMIN — PANTOPRAZOLE SODIUM 40 MG: 40 TABLET, DELAYED RELEASE ORAL at 09:46

## 2023-10-10 RX ADMIN — ATORVASTATIN CALCIUM 40 MG: 40 TABLET, FILM COATED ORAL at 21:47

## 2023-10-10 RX ADMIN — HEPARIN SODIUM 5000 UNITS: 5000 INJECTION, SOLUTION INTRAVENOUS; SUBCUTANEOUS at 09:46

## 2023-10-10 RX ADMIN — OXYCODONE HYDROCHLORIDE 5 MG: 5 TABLET ORAL at 18:33

## 2023-10-10 RX ADMIN — Medication 10 ML: at 09:48

## 2023-10-10 RX ADMIN — ASPIRIN 81 MG CHEWABLE TABLET 81 MG: 81 TABLET CHEWABLE at 09:48

## 2023-10-10 RX ADMIN — OXYCODONE HYDROCHLORIDE 5 MG: 5 TABLET ORAL at 09:48

## 2023-10-10 RX ADMIN — MIDODRINE HYDROCHLORIDE 10 MG: 10 TABLET ORAL at 09:48

## 2023-10-10 RX ADMIN — ROFLUMILAST 250 MCG: 500 TABLET ORAL at 09:47

## 2023-10-10 RX ADMIN — HEPARIN SODIUM 5000 UNITS: 5000 INJECTION, SOLUTION INTRAVENOUS; SUBCUTANEOUS at 21:47

## 2023-10-10 RX ADMIN — OXYCODONE HYDROCHLORIDE 5 MG: 5 TABLET ORAL at 01:21

## 2023-10-10 NOTE — THERAPY DISCHARGE NOTE
Patient Name: Laura Hui  : 1950    MRN: [id number]                              Today's Date: 10/10/2023       Admit Date: 10/3/2023    Visit Dx:     ICD-10-CM ICD-9-CM   1. Community acquired pneumonia, unspecified laterality  J18.9 486   2. Hypoxia  R09.02 799.02   3. Bilateral pleural effusion  J90 511.9   4. Generalized weakness  R53.1 780.79   5. Hyponatremia  E87.1 276.1   6. Renal insufficiency  N28.9 593.9     Patient Active Problem List   Diagnosis    Abnormal thyroid blood test    Obesity (BMI 30.0-34.9)    Other emphysema    Major depressive disorder, recurrent, moderate    Atherosclerosis of native arteries of extremities with intermittent claudication, other extremity    Acute respiratory failure with hypoxia    GERD (gastroesophageal reflux disease)    Hyperlipidemia    Hypertension    Chronic obstructive pulmonary disease    CHF (congestive heart failure)    Anemia    Anxiety and depression    Chronic back pain    HEIDY (acute kidney injury)    Hyponatremia    Cirrhosis of liver    Generalized weakness    Cellulitis of right lower extremity    Severe malnutrition     Past Medical History:   Diagnosis Date    Acid reflux     Anemia     Due to chronic blood loss - Chronic blood loss anemia    Apnea     Arthritis     Backache     CHF (congestive heart failure)     COPD (chronic obstructive pulmonary disease)     Severe    Degeneration of lumbar intervertebral disc     Drug therapy     Finding    Emphysema, unspecified     Esophageal reflux     Family history of malignant neoplasm of breast in first degree relative     Gall stones     GERD (gastroesophageal reflux disease)     H/O spinal fusion     H/O: drug dependency     Hearing loss     Heart disease     Hypertrophic cardiomyopathy     Insomnia     Mixed hyperlipidemia     Neurotic Depression     Recurrent major depressive episodes, moderate     Severe obesity     Tobacco use     History of     Past Surgical History:   Procedure Laterality  Date    APPENDECTOMY      BACK SURGERY      BACK SURGERY      CARDIAC ABLATION      CARDIAC SURGERY      Cardiac Stent Catherization    CHOLECYSTECTOMY      Gall Bladder Surgery    HERNIA REPAIR      HIP SURGERY Left     LAPAROSCOPIC TUBAL LIGATION      NECK SURGERY      NECK SURGERY      ORTHOPEDIC SURGERY        General Information    No documentation.                  Mobility    No documentation.                  Obj/Interventions    No documentation.                  Goals/Plan    No documentation.                  Clinical Impression    No documentation.                  Outcome Measures       Row Name 10/10/23 0947          How much help from another person do you currently need...    Turning from your back to your side while in flat bed without using bedrails? 2  -MC     Moving from lying on back to sitting on the side of a flat bed without bedrails? 2  -MC     Moving to and from a bed to a chair (including a wheelchair)? 2  -MC     Standing up from a chair using your arms (e.g., wheelchair, bedside chair)? 2  -MC     Climbing 3-5 steps with a railing? 1  -MC     To walk in hospital room? 2  -MC     AM-PAC 6 Clicks Score (PT) 11  -MC     Highest level of mobility 4 --> Transferred to chair/commode  -               User Key  (r) = Recorded By, (t) = Taken By, (c) = Cosigned By      Initials Name Provider Type    Mary Saini, RN Registered Nurse                  Physical Therapy Education       Title: PT OT SLP Therapies (Done)       Topic: Physical Therapy (Done)       Point: Mobility training (Done)       Learning Progress Summary             Patient Acceptance, E, VU by  at 10/7/2023 1506    Acceptance, E,TB, VU by  at 10/4/2023 1410                         Point: Home exercise program (Done)       Learning Progress Summary             Patient Acceptance, E, VU by  at 10/7/2023 1506                         Point: Body mechanics (Done)       Learning Progress Summary             Patient  Acceptance, E, VU by  at 10/7/2023 1506    Acceptance, E,TB, VU by  at 10/4/2023 1410                         Point: Precautions (Done)       Learning Progress Summary             Patient Acceptance, E, VU by  at 10/7/2023 1506    Acceptance, E,TB, VU by  at 10/4/2023 1410                                         User Key       Initials Effective Dates Name Provider Type Discipline     11/30/22 -  Jeremiah Saleem RN Registered Nurse Nurse     08/11/22 -  Radha Lazar, LAMAR Physical Therapist PT                  PT Recommendation and Plan           Time Calculation:              PT G-Codes  Outcome Measure Options: AM-PAC 6 Clicks Daily Activity (OT)  AM-PAC 6 Clicks Score (PT): 11  AM-PAC 6 Clicks Score (OT): 12    PT Discharge Summary  Anticipated Discharge Disposition (PT): inpatient rehabilitation facility  Reason for Discharge: Patient/Caregiver request  Outcomes Achieved: Unable to tolerate or actively participate in therapy  Discharge Destination: other (comment) (unsure at this time)    Jo Priest, PT  10/10/2023

## 2023-10-10 NOTE — PLAN OF CARE
Goal Outcome Evaluation:  Plan of Care Reviewed With: patient, daughter     Problem: Adult Inpatient Plan of Care  Goal: Plan of Care Review  Outcome: Ongoing, Progressing  Flowsheets (Taken 10/10/2023 1520)  Progress: no change  Plan of Care Reviewed With:   patient   daughter     Problem: Adult Inpatient Plan of Care  Goal: Absence of Hospital-Acquired Illness or Injury  Outcome: Ongoing, Progressing     Problem: Adult Inpatient Plan of Care  Goal: Optimal Comfort and Wellbeing  Outcome: Ongoing, Progressing     Problem: Adult Inpatient Plan of Care  Goal: Readiness for Transition of Care  Outcome: Ongoing, Progressing     Problem: Fall Injury Risk  Goal: Absence of Fall and Fall-Related Injury  Outcome: Ongoing, Progressing        Progress: no change   VSS. Pain controlled with prn/scheduled medication, however she does not tolerate position changes. Loose watery BM today. Laxatives held this am. Refuses any po intake except sips of CL. Vance to BSD. Will continue to monitor and provide comfort based care.

## 2023-10-10 NOTE — PROGRESS NOTES
[facility] Medicine Services  PROGRESS NOTE    Patient Name: Laura Hui  : 1950  MRN: [id number]    Date of Admission: 10/3/2023  Primary Care Physician: Carmella Barboza DO    Subjective   Subjective     CC:  F/U cirrhosis    HPI:  Seen this morning. Complains of pain.       Objective   Objective     Vital Signs:   Temp:  [97.7 øF (36.5 øC)-98.3 øF (36.8 øC)] 97.7 øF (36.5 øC)  Heart Rate:  [] 95  Resp:  [14-20] 20  BP: (116-128)/(55-70) 116/65  Flow (L/min):  [2-3.5] 2     Physical Exam:  Gen-no acute distress, chronically ill appearing  HENT-NCAT, mucous membranes moist  CV-RRR, S1 S2 normal, no m/r/g  Resp-diminished at the bases, no wheezes or rales  Abd-distended but soft  Ext-+BLE edema  Neuro-A&Ox3, no focal deficits  Skin-no rashes  Psych-appropriate mood      Results Reviewed:  LAB RESULTS:      Lab 10/06/23  0346 10/05/23  0339 10/04/23  0453 10/03/23  1804 10/03/23  1720   WBC 9.40 9.13 9.18  --  10.93*   HEMOGLOBIN 10.6* 11.2* 11.7*  --  14.6   HEMATOCRIT 31.3* 32.7* 34.3  --  42.2   PLATELETS 140 127* 143  --  213   NEUTROS ABS  --   --  7.52*  --  9.28*   IMMATURE GRANS (ABS)  --   --  0.04  --  0.05   LYMPHS ABS  --   --  0.69*  --  0.77   MONOS ABS  --   --  0.88  --  0.80   EOS ABS  --   --  0.03  --  0.02   MCV 94.3 95.3 94.5  --  94.4   PROCALCITONIN  --   --   --   --  0.97*   LACTATE  --   --   --  1.9  --    PROTIME  --   --   --   --  15.2*   D DIMER QUANT  --   --   --   --  2.84*         Lab 10/06/23  0346 10/05/23  0339 10/04/23  0453 10/03/23  2222 10/03/23  1720   SODIUM 129* 127* 127*  --  128*   POTASSIUM 4.5 4.6 4.5  --  5.0   CHLORIDE 95* 95* 96*  --  96*   CO2 15.0* 17.0* 16.0*  --  19.0*   ANION GAP 19.0* 15.0 15.0  --  13.0   BUN 30* 24* 20  --  21   CREATININE 2.39* 2.03* 1.59*  --  1.41*   EGFR 21.0* 25.5* 34.2*  --  39.5*   GLUCOSE 74 77 69  --  91   CALCIUM 10.1 9.1 8.7  --  9.1   MAGNESIUM  --   --   --  1.6 2.0   PHOSPHORUS 3.7   --   --   --   --    TSH  --   --   --   --  4.920*         Lab 10/06/23  0346 10/05/23  0339 10/04/23  0453 10/03/23  1720   TOTAL PROTEIN 5.5* 4.6* 4.7* 5.5*   ALBUMIN 3.6 2.7* 2.3* 2.3*   GLOBULIN 1.9 1.9 2.4 3.2   ALT (SGPT) 15 18 19 17   AST (SGOT) 40* 51* 55* 71*   BILIRUBIN 0.9 0.8 0.9 1.1   ALK PHOS 164* 180* 194* 262*         Lab 10/03/23  2222 10/03/23  1950/23  1720   PROBNP  --   --  10,567.0*   HSTROP T 77* 74* 80*   PROTIME  --   --  15.2*   INR  --   --  1.19*             Lab 10/04/23  0453   IRON 50   IRON SATURATION (TSAT) 35   TIBC 143*   TRANSFERRIN 96*         Lab 10/03/23  2328   PH, ARTERIAL 7.454*   PCO2, ARTERIAL 25.7*   PO2 ART 82.6*   FIO2 32   HCO3 ART 18.0*   BASE EXCESS ART -4.4*   CARBOXYHEMOGLOBIN 0.9     Brief Urine Lab Results  (Last result in the past 365 days)        Color   Clarity   Blood   Leuk Est   Nitrite   Protein   CREAT   Urine HCG        10/05/23 1322 Yellow   Clear   Negative   Negative   Negative   Negative                   Microbiology Results Abnormal       Procedure Component Value - Date/Time    Blood Culture - Blood, Hand, Left [[id number]]  (Normal) Collected: 10/03/23 1804    Lab Status: Final result Specimen: Blood from Hand, Left Updated: 10/08/23 1815     Blood Culture No growth at 5 days    Blood Culture - Blood, Arm, Right [[id number]]  (Normal) Collected: 10/03/23 1720    Lab Status: Final result Specimen: Blood from Arm, Right Updated: 10/08/23 1815     Blood Culture No growth at 5 days    MRSA Screen, PCR (Inpatient) - Swab, Nares [[id number]]  (Normal) Collected: 10/03/23 2327    Lab Status: Final result Specimen: Swab from Nares Updated: 10/04/23 0811     MRSA PCR Negative    Narrative:      The negative predictive value of this diagnostic test is high and should only be used to consider de-escalating anti-MRSA therapy. A positive result may indicate colonization with MRSA and must be correlated clinically.  MRSA Negative    Respiratory Panel PCR  w/COVID-19(SARS-CoV-2) JEN/BETSY/ELIEZER/PAD/COR/MAD/URSULA In-House, NP Swab in UTM/VTM, 3-4 HR TAT - Swab, Nasopharynx [[id number]]  (Normal) Collected: 10/03/23 1726    Lab Status: Final result Specimen: Swab from Nasopharynx Updated: 10/03/23 4318     ADENOVIRUS, PCR Not Detected     Coronavirus 229E Not Detected     Coronavirus HKU1 Not Detected     Coronavirus NL63 Not Detected     Coronavirus OC43 Not Detected     COVID19 Not Detected     Human Metapneumovirus Not Detected     Human Rhinovirus/Enterovirus Not Detected     Influenza A PCR Not Detected     Influenza B PCR Not Detected     Parainfluenza Virus 1 Not Detected     Parainfluenza Virus 2 Not Detected     Parainfluenza Virus 3 Not Detected     Parainfluenza Virus 4 Not Detected     RSV, PCR Not Detected     Bordetella pertussis pcr Not Detected     Bordetella parapertussis PCR Not Detected     Chlamydophila pneumoniae PCR Not Detected     Mycoplasma pneumo by PCR Not Detected    Narrative:      In the setting of a positive respiratory panel with a viral infection PLUS a negative procalcitonin without other underlying concern for bacterial infection, consider observing off antibiotics or discontinuation of antibiotics and continue supportive care. If the respiratory panel is positive for atypical bacterial infection (Bordetella pertussis, Chlamydophila pneumoniae, or Mycoplasma pneumoniae), consider antibiotic de-escalation to target atypical bacterial infection.            No radiology results from the last 24 hrs        Current medications:  Scheduled Meds:aspirin, 81 mg, Oral, Daily  atorvastatin, 40 mg, Oral, Nightly  budesonide-formoterol, 2 puff, Inhalation, BID - RT   And  tiotropium bromide monohydrate, 2 puff, Inhalation, Daily - RT  citalopram, 20 mg, Oral, Daily  heparin (porcine), 5,000 Units, Subcutaneous, Q12H  metoprolol tartrate, 12.5 mg, Oral, BID With Meals  midodrine, 10 mg, Oral, TID AC  oxyCODONE, 5 mg, Oral, Q8H  pantoprazole, 40 mg, Oral,  Daily  rifAXIMin, 550 mg, Oral, Q12H  roflumilast, 250 mcg, Oral, Daily  saccharomyces boulardii, 250 mg, Oral, BID  senna-docusate sodium, 2 tablet, Oral, BID  sodium chloride, 10 mL, Intravenous, Q12H  traZODone, 150 mg, Oral, Nightly      Continuous Infusions:   PRN Meds:.  acetaminophen    senna-docusate sodium **AND** polyethylene glycol **AND** bisacodyl **AND** bisacodyl    Calcium Replacement - Follow Nurse / BPA Driven Protocol    HYDROmorphone    hydrOXYzine    Magnesium Low Dose Replacement - Follow Nurse / BPA Driven Protocol    melatonin    ondansetron    oxyCODONE    Phosphorus Replacement - Follow Nurse / BPA Driven Protocol    Potassium Replacement - Follow Nurse / BPA Driven Protocol    sodium chloride    sodium chloride    Assessment & Plan   Assessment & Plan     Active Hospital Problems    Diagnosis  POA    **Acute respiratory failure with hypoxia [J96.01]  Yes    Severe malnutrition [E43]  Yes    CHF (congestive heart failure) [I50.9]  Yes    Anemia [D64.9]  Yes    Anxiety and depression [F41.9, F32.A]  Yes    Chronic back pain [M54.9, G89.29]  Yes    HEIDY (acute kidney injury) [N17.9]  Yes    Hyponatremia [E87.1]  Yes    Cirrhosis of liver [K74.60]  Yes    Generalized weakness [R53.1]  Yes    Cellulitis of right lower extremity [L03.115]  Yes    GERD (gastroesophageal reflux disease) [K21.9]  Yes    Hypertension [I10]  Yes    Atherosclerosis of native arteries of extremities with intermittent claudication, other extremity [I70.218]  Yes    Chronic obstructive pulmonary disease [J44.9]  Yes    Hyperlipidemia [E78.5]  Yes      Resolved Hospital Problems   No resolved problems to display.        Brief Hospital Course to date:  Laura Hui is a 73 y.o. female with hx of HTN, HLD, HFpEF, PVD, COPD, chronic back pain, GERD, anemia, and anxiety/depression who presents due to dyspnea x 3 days and weakness. CTA chest demonstrated moderate left effusion and smaller right effusion; cirrhotic liver with  large ascites noted. Liver US with cirrhosis, ascites and right pleural effusion. Diagnostic and therapeutic paracentesis was performed on 10/4/23 with 3.3 L removed. SAAG consistent with portal HTN. No evidence of SBP. Creatinine elevated on presentation, noted to be 1.41. Urine sodium less than 20. UA negative. She received albumin and midodrine without improvement. After further discussion with patient/family, decision made to proceed with comfort measures. Palliative Care and Hospice following.     This patient's problems and plans were partially entered by my partner and updated as appropriate by me 10/10/23. Copied text in this note has been reviewed and is accurate as of today's date.      Goals of care  -Hospice following, currently on a comfort focused plan of care  -Trying to decide between home hospice vs inpatient hospice  -No further lab checks or fingersticks  -Continue pain control with PRN Oxycodone and IV Dilauidid   -Per GI, may benefit from paracentesis before discharge, could consider peritoneal drain placement      Left > right pleural effusion  Cirrhosis/ascites - new finding   Hypoalbuminemia  - GI followed during admission  - s/p paracentesis 10/4/23 with 3.3 L removed  - Thoracentesis deferred as SOA improved following paracentesis  - ATBx discontinued (had been started on empirically for PNA, but low suspicion for active infection)  - Now comfort measures    HEIDY, hepatorenal syndrome  - Likely hepatorenal. Urine Na < 20. UA negative. S/p albumin and midodrine with no improvement.   - Nephrology followed during admission  - Cr trending up, no further checks given [facility]  - Vance for comfort     Dyspnea and hypoxia   - She was recently admitted at [facility] and diagnosed with CHF, but had Echo showing EF 75%; diuretic was increased   - CTA chest with no PE, large left pleural effusion and moderate right pleural effusion, atelectasis  - Improvement in respiratory status following paracentesis.   -  Initially on Doxycycline/Rocephin, discontinued due to low suspicion for pneumonia     Possible HFpEF  HTN   HLD  - Recent admission with Echo at [facility] [place]  - Currently holding Bumex due to HEIDY  - Hold Metoprolol due to hypotension   - Continue statin     Acute onset weakness  - Until a couple weeks ago she could walk without a cane or walker  - Now using a wheelchair     Lightheadedness  Borderline hypotension  - Holding metoprolol and Bumex  - s/p albumin and midodrine without improvement, remains on midodrine currently     COPD, emphysema   - Not on supplemental oxygen at home  - Former smoker, now vapes. Encouraged cessation.  - Continue with inhalers      Hyponatremia, hypervolemic   - Cortisol appropriate, TSH mildly elevated at 4.9, T4 normal  - Stopped lab draws     PVD  - Incidental finding of atherosclerosis of left subclavian artery, suggesting high-grade stenosis, or possibly occlusion  - Defer Vascular Surgery consult given GOC     Generalized weakness  - PT/OT     Anemia  - H/H stable and at baseline  - No further lab draws     GERD  - PPI     Anxiety/depression  - Celexa, Trazodone       Expected Discharge Location and Transportation: home with hospice vs inpatient hospice  Expected Discharge   Expected Discharge Date: 10/13/2023; Expected Discharge Time:      DVT prophylaxis:  Medical DVT prophylaxis orders are present.     AM-PAC 6 Clicks Score (PT): 11 (10/10/23 4601)    CODE STATUS:   Code Status and Medical Interventions:   Ordered at: 10/09/23 0815     Level Of Support Discussed With:    Patient    Next of Kin (If No Surrogate)     Code Status (Patient has no pulse and is not breathing):    No CPR (Do Not Attempt to Resuscitate)     Medical Interventions (Patient has pulse or is breathing):    Comfort Measures       Jolanta Poon MD  10/10/23

## 2023-10-10 NOTE — PROGRESS NOTES
Nutrition Services    Patient Name:  Laura Hui  YOB: 1950  MRN: [id number]  Admit Date:  10/3/2023    RD notes pt now with CMO, Hospice following. Will continue to follow in the peripheral continuing nutritional interventions in place. Please consult Nutrition if needed for specific dietary needs or in the event that GOC should change, thank you.     Electronically signed by:  Grisel Navarro RD  10/10/23 14:45 EDT

## 2023-10-10 NOTE — PROGRESS NOTES
"Continued Stay Note  [facility]     Patient Name: Laura Hui  MRN: [id number]  Today's Date: 10/10/2023    Admit Date: 10/3/2023    Plan: TBD   Discharge Plan       Row Name 10/10/23 1258       Plan    Plan TBD    Patient/Family in Agreement with Plan yes    Plan Comments Hospice f/u visit made. Pt. was awake w/ daughter, Mary, @ [facility]. Pt was able to engage in conversation w/ writer, but did appear to struggle to stay awake. Pt did ask for pepsi, but continue to have poor PO intake. Daughter's additional hospice related questions/concerns answered/addressed. Hospice will continue to follow & assess for a decline. If further assistance is needed, please call [phone].    *Inpatient hospice criteria is based on guidelines from Medicare/Medicaid. To meet eligibility requirements, one must not only be eligible for hospice, but have \"unmanaged symptoms\" which means symptoms (pain/nausea/dyspnea/agitation/anxiety) unable to be cared for at a lower level of care (home/nursing home). Usually requiring frequent medication titration and/or IV medication management/inability to take PO. Unfortunately non-symptomatic end of life (within days of passing) is not a sole qualification for inpatient hospice. Inpt hospice is also for short term acute symptom management and if symptoms are managed, disposition for discharge will be addressed.*                      Discharge Codes    No documentation.                 Expected Discharge Date and Time       Expected Discharge Date Expected Discharge Time    Oct 13, 2023               Luly Ferguson    "

## 2023-10-10 NOTE — PLAN OF CARE
Goal Outcome Evaluation:  Plan of Care Reviewed With: patient        Progress: no change  Outcome Evaluation: Pt. sitting up in bed on 3.5LNC asleep but roused to voice.  She endorsed RLQ abdominal pain of 7/10.  Pt. had just received scheduled pain medication and stated the pain was already subsiding and she was beginning to feel drowsy.  She also endorsed dyspnea at rest.  Visit cut short so pt could go back to sleep.  Pallaitive care to continue to follow for support and ongoing POC.

## 2023-10-10 NOTE — TELEPHONE ENCOUNTER
Caller: Mary Rodríguez    Relationship to patient: Emergency Contact    Best call back number: [phone]     Patient is needing: PATIENTS DAUGHTER STATES SHE WAS ADMITTED ON 10.3.23 TO [facility] AND SHE HAS END STAGE LIVER FAILURE. SHE IS WANTING TO INFORM THE OFFICE AS ANY FOLLOW UP APPOINTMENTS SHE WILL NOT BE ABLE TO ATTEND.

## 2023-10-11 PROCEDURE — 25010000002 HYDROMORPHONE PER 4 MG: Performed by: INTERNAL MEDICINE

## 2023-10-11 PROCEDURE — 25010000002 HEPARIN (PORCINE) PER 1000 UNITS: Performed by: PHYSICIAN ASSISTANT

## 2023-10-11 RX ADMIN — MIDODRINE HYDROCHLORIDE 10 MG: 10 TABLET ORAL at 16:48

## 2023-10-11 RX ADMIN — ROFLUMILAST 250 MCG: 500 TABLET ORAL at 09:20

## 2023-10-11 RX ADMIN — RIFAXIMIN 550 MG: 550 TABLET ORAL at 09:21

## 2023-10-11 RX ADMIN — ASPIRIN 81 MG CHEWABLE TABLET 81 MG: 81 TABLET CHEWABLE at 09:19

## 2023-10-11 RX ADMIN — CITALOPRAM HYDROBROMIDE 20 MG: 20 TABLET ORAL at 09:21

## 2023-10-11 RX ADMIN — Medication 12.5 MG: at 17:01

## 2023-10-11 RX ADMIN — HEPARIN SODIUM 5000 UNITS: 5000 INJECTION, SOLUTION INTRAVENOUS; SUBCUTANEOUS at 20:40

## 2023-10-11 RX ADMIN — MIDODRINE HYDROCHLORIDE 10 MG: 10 TABLET ORAL at 09:21

## 2023-10-11 RX ADMIN — Medication 10 ML: at 09:23

## 2023-10-11 RX ADMIN — PANTOPRAZOLE SODIUM 40 MG: 40 TABLET, DELAYED RELEASE ORAL at 09:20

## 2023-10-11 RX ADMIN — MIDODRINE HYDROCHLORIDE 10 MG: 10 TABLET ORAL at 12:58

## 2023-10-11 RX ADMIN — Medication 12.5 MG: at 09:20

## 2023-10-11 RX ADMIN — HYDROMORPHONE HYDROCHLORIDE 0.25 MG: 1 INJECTION, SOLUTION INTRAMUSCULAR; INTRAVENOUS; SUBCUTANEOUS at 05:02

## 2023-10-11 RX ADMIN — Medication 250 MG: at 20:40

## 2023-10-11 RX ADMIN — Medication 10 ML: at 20:44

## 2023-10-11 RX ADMIN — Medication 250 MG: at 09:21

## 2023-10-11 RX ADMIN — OXYCODONE HYDROCHLORIDE 5 MG: 5 TABLET ORAL at 09:21

## 2023-10-11 RX ADMIN — HEPARIN SODIUM 5000 UNITS: 5000 INJECTION, SOLUTION INTRAVENOUS; SUBCUTANEOUS at 09:22

## 2023-10-11 RX ADMIN — RIFAXIMIN 550 MG: 550 TABLET ORAL at 20:40

## 2023-10-11 RX ADMIN — TRAZODONE HYDROCHLORIDE 150 MG: 50 TABLET ORAL at 20:40

## 2023-10-11 RX ADMIN — ATORVASTATIN CALCIUM 40 MG: 40 TABLET, FILM COATED ORAL at 20:40

## 2023-10-11 RX ADMIN — OXYCODONE HYDROCHLORIDE 5 MG: 5 TABLET ORAL at 16:48

## 2023-10-11 NOTE — PROGRESS NOTES
[facility] Medicine Services  PROGRESS NOTE    Patient Name: Laura Hui  : 1950  MRN: [id number]    Date of Admission: 10/3/2023  Primary Care Physician: Carmella Barboza DO    Subjective   Subjective     CC:  F/U cirrhosis    HPI:  Up in bed. Tired. Denies soa or pain. States stomach does not feel distended or uncomfortable.       Objective   Objective     Vital Signs:   Temp:  [97.8 øF (36.6 øC)-98.2 øF (36.8 øC)] 98.2 øF (36.8 øC)  Heart Rate:  [] 118  Resp:  [14-16] 14  BP: (109-122)/(58-65) 109/58  Flow (L/min):  [2] 2     Physical Exam:  Gen-no acute distress, appears tired, chronically ill appearing   HENT-NCAT, mucous membranes dry  CV-RRR,   Resp-CTAB, unlabored, diminished at bases  Abd-soft, NT, no significant distention  Ext-no LE edema  Neuro-answers ROS. Seems to have some confused conversation situationally   Skin-no rashes  Psych-appropriate mood      Results Reviewed:  LAB RESULTS:      Lab 10/06/23  0346 10/05/23  033   WBC 9.40 9.13   HEMOGLOBIN 10.6* 11.2*   HEMATOCRIT 31.3* 32.7*   PLATELETS 140 127*   MCV 94.3 95.3         Lab 10/06/23  0346 10/05/23  0339   SODIUM 129* 127*   POTASSIUM 4.5 4.6   CHLORIDE 95* 95*   CO2 15.0* 17.0*   ANION GAP 19.0* 15.0   BUN 30* 24*   CREATININE 2.39* 2.03*   EGFR 21.0* 25.5*   GLUCOSE 74 77   CALCIUM 10.1 9.1   PHOSPHORUS 3.7  --          Lab 10/06/23  0346 10/05/23  033   TOTAL PROTEIN 5.5* 4.6*   ALBUMIN 3.6 2.7*   GLOBULIN 1.9 1.9   ALT (SGPT) 15 18   AST (SGOT) 40* 51*   BILIRUBIN 0.9 0.8   ALK PHOS 164* 180*                           Brief Urine Lab Results  (Last result in the past 365 days)        Color   Clarity   Blood   Leuk Est   Nitrite   Protein   CREAT   Urine HCG        10/05/23 1322 Yellow   Clear   Negative   Negative   Negative   Negative                   Microbiology Results Abnormal       Procedure Component Value - Date/Time    Blood Culture - Blood, Hand, Left [[id number]]  (Normal)  Collected: 10/03/23 1804    Lab Status: Final result Specimen: Blood from Hand, Left Updated: 10/08/23 1815     Blood Culture No growth at 5 days    Blood Culture - Blood, Arm, Right [[id number]]  (Normal) Collected: 10/03/23 1720    Lab Status: Final result Specimen: Blood from Arm, Right Updated: 10/08/23 1815     Blood Culture No growth at 5 days    MRSA Screen, PCR (Inpatient) - Swab, Nares [[id number]]  (Normal) Collected: 10/03/23 2327    Lab Status: Final result Specimen: Swab from Nares Updated: 10/04/23 0811     MRSA PCR Negative    Narrative:      The negative predictive value of this diagnostic test is high and should only be used to consider de-escalating anti-MRSA therapy. A positive result may indicate colonization with MRSA and must be correlated clinically.  MRSA Negative    Respiratory Panel PCR w/COVID-19(SARS-CoV-2) JEN/BETSY/ELIEZER/PAD/COR/MAD/URSULA In-House, NP Swab in UTM/VTM, 3-4 HR TAT - Swab, Nasopharynx [[id number]]  (Normal) Collected: 10/03/23 1726    Lab Status: Final result Specimen: Swab from Nasopharynx Updated: 10/03/23 1825     ADENOVIRUS, PCR Not Detected     Coronavirus 229E Not Detected     Coronavirus HKU1 Not Detected     Coronavirus NL63 Not Detected     Coronavirus OC43 Not Detected     COVID19 Not Detected     Human Metapneumovirus Not Detected     Human Rhinovirus/Enterovirus Not Detected     Influenza A PCR Not Detected     Influenza B PCR Not Detected     Parainfluenza Virus 1 Not Detected     Parainfluenza Virus 2 Not Detected     Parainfluenza Virus 3 Not Detected     Parainfluenza Virus 4 Not Detected     RSV, PCR Not Detected     Bordetella pertussis pcr Not Detected     Bordetella parapertussis PCR Not Detected     Chlamydophila pneumoniae PCR Not Detected     Mycoplasma pneumo by PCR Not Detected    Narrative:      In the setting of a positive respiratory panel with a viral infection PLUS a negative procalcitonin without other underlying concern for bacterial infection,  consider observing off antibiotics or discontinuation of antibiotics and continue supportive care. If the respiratory panel is positive for atypical bacterial infection (Bordetella pertussis, Chlamydophila pneumoniae, or Mycoplasma pneumoniae), consider antibiotic de-escalation to target atypical bacterial infection.            No radiology results from the last 24 hrs        Current medications:  Scheduled Meds:aspirin, 81 mg, Oral, Daily  atorvastatin, 40 mg, Oral, Nightly  budesonide-formoterol, 2 puff, Inhalation, BID - RT   And  tiotropium bromide monohydrate, 2 puff, Inhalation, Daily - RT  citalopram, 20 mg, Oral, Daily  heparin (porcine), 5,000 Units, Subcutaneous, Q12H  metoprolol tartrate, 12.5 mg, Oral, BID With Meals  midodrine, 10 mg, Oral, TID AC  oxyCODONE, 5 mg, Oral, Q8H  pantoprazole, 40 mg, Oral, Daily  rifAXIMin, 550 mg, Oral, Q12H  roflumilast, 250 mcg, Oral, Daily  saccharomyces boulardii, 250 mg, Oral, BID  senna-docusate sodium, 2 tablet, Oral, BID  sodium chloride, 10 mL, Intravenous, Q12H  traZODone, 150 mg, Oral, Nightly      Continuous Infusions:   PRN Meds:.  acetaminophen    senna-docusate sodium **AND** polyethylene glycol **AND** bisacodyl **AND** bisacodyl    Calcium Replacement - Follow Nurse / BPA Driven Protocol    glycopyrrolate    HYDROmorphone    hydrOXYzine    Magnesium Low Dose Replacement - Follow Nurse / BPA Driven Protocol    melatonin    ondansetron    oxyCODONE    Phosphorus Replacement - Follow Nurse / BPA Driven Protocol    Potassium Replacement - Follow Nurse / BPA Driven Protocol    sodium chloride    sodium chloride    Assessment & Plan   Assessment & Plan     Active Hospital Problems    Diagnosis  POA    **Acute respiratory failure with hypoxia [J96.01]  Yes    Severe malnutrition [E43]  Yes    CHF (congestive heart failure) [I50.9]  Yes    Anemia [D64.9]  Yes    Anxiety and depression [F41.9, F32.A]  Yes    Chronic back pain [M54.9, G89.29]  Yes    HEIDY (acute  kidney injury) [N17.9]  Yes    Hyponatremia [E87.1]  Yes    Cirrhosis of liver [K74.60]  Yes    Generalized weakness [R53.1]  Yes    Cellulitis of right lower extremity [L03.115]  Yes    GERD (gastroesophageal reflux disease) [K21.9]  Yes    Hypertension [I10]  Yes    Atherosclerosis of native arteries of extremities with intermittent claudication, other extremity [I70.218]  Yes    Chronic obstructive pulmonary disease [J44.9]  Yes    Hyperlipidemia [E78.5]  Yes      Resolved Hospital Problems   No resolved problems to display.        Brief Hospital Course to date:  Laura Hui is a 73 y.o. female with hx of HTN, HLD, HFpEF, PVD, COPD, chronic back pain, GERD, anemia, and anxiety/depression who presents due to dyspnea x 3 days and weakness. CTA chest demonstrated moderate left effusion and smaller right effusion; cirrhotic liver with large ascites noted. Liver US with cirrhosis, ascites and right pleural effusion. Diagnostic and therapeutic paracentesis was performed on 10/4/23 with 3.3 L removed. SAAG consistent with portal HTN. No evidence of SBP. Creatinine elevated on presentation, noted to be 1.41. Urine sodium less than 20. UA negative. She received albumin and midodrine without improvement. After further discussion with patient/family, decision made to proceed with comfort measures. Palliative Care and Hospice following.     This patient's problems and plans were partially entered by my partner and updated as appropriate by me 10/11/23.   Goals of care  -Hospice following, currently on a comfort focused plan of care  -Trying to decide between home hospice vs inpatient hospice  -No further lab checks or fingersticks, no aggressive measures  -Continue pain control with scheduled and PRN Oxycodone and IV Dilauidid PRN  -Per GI, may benefit from paracentesis before discharge, could consider peritoneal drain placement- monitor for need     Left > right pleural effusion  Cirrhosis/ascites - new finding    Hypoalbuminemia  - GI followed during admission  - s/p paracentesis 10/4/23 with 3.3 L removed  - Thoracentesis deferred as SOA improved following paracentesis  - ATBx discontinued (had been started on empirically for PNA, but low suspicion for active infection)  - Now comfort measures    HEIDY, hepatorenal syndrome  - Likely hepatorenal. Urine Na < 20. UA negative. S/p albumin and midodrine with no improvement.   - Nephrology followed during admission  - Cr trending up, no further checks given GOC  - Vance for comfort     Dyspnea and hypoxia   - [place] [facility] [place] with [place]F, but had Echo showing EF 75%; diuretic was increased   - CTA chest with no PE, large left pleural effusion and moderate right pleural effusion, atelectasis  - Improvement in respiratory status following paracentesis.   - Initially on Doxycycline/Rocephin, discontinued due to low suspicion for pneumonia     Possible HFpEF  HTN   HLD  - Recent admission with [place] [facility]  - Currently holding Bumex due to HEIDY, decreased intake/ euvolemia  - metoprolol 12.5mg bid, hold parameters  - Continue statin     Acute onset weakness  - Until a couple weeks ago she could walk without a cane or walker  - Now using a wheelchair     Lightheadedness  Borderline hypotension  - Holding Bumex  - s/p albumin and midodrine without improvement, remains on midodrine currently TID  - metoprolol restarted- BP stable     COPD, emphysema   - Not on supplemental oxygen at home  - Former smoker, now vapes. Encouraged cessation.  - Continue with inhalers      Hyponatremia, hypervolemic   - Cortisol appropriate, TSH mildly elevated at 4.9, T4 normal  - Stopped lab draws     PVD  - Incidental finding of atherosclerosis of left subclavian artery, suggesting high-grade stenosis, or possibly occlusion  - Defer Vascular Surgery consult given [facility]     Generalized weakness  - PT/OT attempted, but patient not participating at this time     Anemia  - No further  lab draws     GERD  - PPI     Anxiety/depression  - Celexa, Trazodone       Expected Discharge Location and Transportation: home with hospice vs inpatient hospice  Expected Discharge   Expected Discharge Date: 10/13/2023; Expected Discharge Time:      DVT prophylaxis:  Medical DVT prophylaxis orders are present.     AM-PAC 6 Clicks Score (PT): 11 (10/11/23 0800)    CODE STATUS:   Code Status and Medical Interventions:   Ordered at: 10/09/23 1506     Level Of Support Discussed With:    Patient    Next of Kin (If No Surrogate)     Code Status (Patient has no pulse and is not breathing):    No CPR (Do Not Attempt to Resuscitate)     Medical Interventions (Patient has pulse or is breathing):    Comfort Measures       Priti Red, APRN  10/11/23

## 2023-10-11 NOTE — PROGRESS NOTES
Visit made to bedside, patient's daughter and friend are at bedside. Patient appears comfortable, O2 in use via nasal cannula. Patient alert and talkative.Continue to follow peripherally. Please call [phone] for any concerns/questions.Continued Stay Note  [facility]     Patient Name: Laura Hui  MRN: [id number]  Today's Date: 10/11/2023    Admit Date: 10/3/2023    Plan: TBD   Discharge Plan    No documentation.                  Discharge Codes    No documentation.                 Expected Discharge Date and Time       Expected Discharge Date Expected Discharge Time    Oct 13, 2023               Laquata Hume, RN

## 2023-10-11 NOTE — PLAN OF CARE
Problem: Adult Inpatient Plan of Care  Goal: Plan of Care Review  Outcome: Ongoing, Progressing  Goal: Patient-Specific Goal (Individualized)  Outcome: Ongoing, Progressing  Goal: Absence of Hospital-Acquired Illness or Injury  Outcome: Ongoing, Progressing  Intervention: Identify and Manage Fall Risk  Recent Flowsheet Documentation  Taken 10/11/2023 0400 by Nargis Kaba RN  Safety Promotion/Fall Prevention:   clutter free environment maintained   room organization consistent   safety round/check completed  Taken 10/11/2023 0200 by Nargis Kaba RN  Safety Promotion/Fall Prevention:   clutter free environment maintained   room organization consistent   safety round/check completed  Taken 10/11/2023 0000 by Nargis Kaba RN  Safety Promotion/Fall Prevention:   clutter free environment maintained   room organization consistent   safety round/check completed  Taken 10/10/2023 2200 by Nargis Kaba RN  Safety Promotion/Fall Prevention:   clutter free environment maintained   room organization consistent   safety round/check completed  Taken 10/10/2023 2000 by Nargis Kaba RN  Safety Promotion/Fall Prevention:   activity supervised   clutter free environment maintained   room organization consistent   safety round/check completed  Intervention: Prevent Skin Injury  Recent Flowsheet Documentation  Taken 10/11/2023 0400 by Nargis Kaba RN  Body Position: position changed independently  Taken 10/11/2023 0200 by Nargis Kaba RN  Body Position: position changed independently  Taken 10/11/2023 0000 by Nargis Kaba RN  Body Position: position changed independently  Taken 10/10/2023 2200 by Nargis Kaba RN  Body Position: position changed independently  Taken 10/10/2023 2000 by Nargis Kaba RN  Body Position: weight shifting  Intervention: Prevent and Manage VTE (Venous Thromboembolism) Risk  Recent Flowsheet Documentation  Taken 10/10/2023 2000 by Nargis Kaba RN  Activity Management: activity  minimized  Intervention: Prevent Infection  Recent Flowsheet Documentation  Taken 10/11/2023 0400 by Nargis Kaba RN  Infection Prevention:   environmental surveillance performed   rest/sleep promoted  Taken 10/11/2023 0200 by Nargis Kaba RN  Infection Prevention:   environmental surveillance performed   rest/sleep promoted  Taken 10/11/2023 0000 by Nargis Kaba RN  Infection Prevention:   environmental surveillance performed   rest/sleep promoted  Taken 10/10/2023 2200 by Nargis Kaba RN  Infection Prevention:   environmental surveillance performed   rest/sleep promoted  Taken 10/10/2023 2000 by Nargis Kaba RN  Infection Prevention:   environmental surveillance performed   rest/sleep promoted  Goal: Optimal Comfort and Wellbeing  Outcome: Ongoing, Progressing  Goal: Readiness for Transition of Care  Outcome: Ongoing, Progressing     Problem: Fall Injury Risk  Goal: Absence of Fall and Fall-Related Injury  Outcome: Ongoing, Progressing  Intervention: Promote Injury-Free Environment  Recent Flowsheet Documentation  Taken 10/11/2023 0400 by Nargis Kaba RN  Safety Promotion/Fall Prevention:   clutter free environment maintained   room organization consistent   safety round/check completed  Taken 10/11/2023 0200 by Nargis Kaba RN  Safety Promotion/Fall Prevention:   clutter free environment maintained   room organization consistent   safety round/check completed  Taken 10/11/2023 0000 by Nargis Kaba RN  Safety Promotion/Fall Prevention:   clutter free environment maintained   room organization consistent   safety round/check completed  Taken 10/10/2023 2200 by Nargis Kaba RN  Safety Promotion/Fall Prevention:   clutter free environment maintained   room organization consistent   safety round/check completed  Taken 10/10/2023 2000 by Nargis Kaba RN  Safety Promotion/Fall Prevention:   activity supervised   clutter free environment maintained   room organization consistent   safety  round/check completed     Problem: Skin Injury Risk Increased  Goal: Skin Health and Integrity  Outcome: Ongoing, Progressing  Intervention: Optimize Skin Protection  Recent Flowsheet Documentation  Taken 10/10/2023 2000 by Nargis Kaba, RN  Head of Bed (HOB) Positioning: HOB elevated     Problem: Palliative Care  Goal: Enhanced Quality of Life  Outcome: Ongoing, Progressing   Goal Outcome Evaluation:

## 2023-10-11 NOTE — PROGRESS NOTES
"Palliative Care Daily Progress Note     C/C: Patient reporting dyspnea.     S: Medical record reviewed. Follow up visit for C in the context of complex medical decision making. Events noted. Patient reports pain currently controlled. She reports dyspnea with any exertion. Patient requiring Hydromorphone 0.25mg IV x2 doses, and Oxycodone 5mg PO x 3 doses for total 32.5 MME's in the last 24 hours. Decreased intake.     ROS: +pain, abdominal, constant ache, reports pain controlled with recent Oxycodone. +nausea/decreased appetite, patient reports smell of food makes her sick. ROS limited by drowsiness.     O: Code Status:   Code Status and Medical Interventions:   Ordered at: 10/09/23 1506     Level Of Support Discussed With:    Patient    Next of Kin (If No Surrogate)     Code Status (Patient has no pulse and is not breathing):    No CPR (Do Not Attempt to Resuscitate)     Medical Interventions (Patient has pulse or is breathing):    Comfort Measures      Advanced Directives: Advance Directive Status: Patient does not have advance directive   Goals of Care: Ongoing.   Palliative Performance Scale Score: 30%     /58 (BP Location: Left arm, Patient Position: Lying)   Pulse 118   Temp 98.2 øF (36.8 øC) (Oral)   Resp 14   Ht 165.1 cm (65\")   Wt 80.4 kg (177 lb 3.2 oz)   SpO2 91%   BMI 29.49 kg/mý     Intake/Output Summary (Last 24 hours) at 10/11/2023 1335  Last data filed at 10/11/2023 1200  Gross per 24 hour   Intake 480 ml   Output --   Net 480 ml       PE:  General Appearance:    Patient laying in bed, drowsy, awake, A/C ill appearing, cooperative, NAD   HEENT:    NC/AT, EOMI, anicteric, MMM, face relaxed, NC in place   Neck:   supple, trachea midline, no JVD   Lungs:     CTA bilat, diminished in bases; respirations regular, even and unlabored; RR 16-18 on exam, 2LNC    Heart:    RRR, normal S1 and S2, no M/R/G,  on monitor   Abdomen:     Normal bowel sounds, soft, tender, distended   G/U:   " Deferred   MSK/Extremities:  Bruising to upper extremities, no edema   Pulses:   Pulses palpable and equal bilaterally   Skin:   Warm, dry   Neurologic:   Drowsy, Ox3, cooperative, YOUNGER   Psych:   Calm, appropriate     Meds: Reviewed and changes noted    Labs:   Results from last 7 days   Lab Units 10/06/23  0346   WBC 10*3/mm3 9.40   HEMOGLOBIN g/dL 10.6*   HEMATOCRIT % 31.3*   PLATELETS 10*3/mm3 140     Results from last 7 days   Lab Units 10/06/23  0346   SODIUM mmol/L 129*   POTASSIUM mmol/L 4.5   CHLORIDE mmol/L 95*   CO2 mmol/L 15.0*   BUN mg/dL 30*   CREATININE mg/dL 2.39*   GLUCOSE mg/dL 74   CALCIUM mg/dL 10.1     Results from last 7 days   Lab Units 10/06/23  0346   SODIUM mmol/L 129*   POTASSIUM mmol/L 4.5   CHLORIDE mmol/L 95*   CO2 mmol/L 15.0*   BUN mg/dL 30*   CREATININE mg/dL 2.39*   CALCIUM mg/dL 10.1   BILIRUBIN mg/dL 0.9   ALK PHOS U/L 164*   ALT (SGPT) U/L 15   AST (SGOT) U/L 40*   GLUCOSE mg/dL 74     Imaging Results (Last 72 Hours)       ** No results found for the last 72 hours. **                  Diagnostics: Reviewed    A:   Acute respiratory failure with hypoxia    Atherosclerosis of native arteries of extremities with intermittent claudication, other extremity    GERD (gastroesophageal reflux disease)    Hyperlipidemia    Hypertension    Chronic obstructive pulmonary disease    CHF (congestive heart failure)    Anemia    Anxiety and depression    Chronic back pain    HEIDY (acute kidney injury)    Hyponatremia    Cirrhosis of liver    Generalized weakness    Cellulitis of right lower extremity    Severe malnutrition     73 y.o. female with SIMS cirrhosis with ascites, HEIDY, malnutrition.   S/Sx:  1. Pain -secondary to ascites  -scheduled Oxycodone 5mg PO q 8 hours and continue q4 hours prn pain  -continue Hydromorphone 0.25mg IV q 2 hours prn pain    2. Dyspnea -currently on 2LNC  -may give Oxycodone/Hydromorphone prn dyspnea    3. Nausea -started Zofran 4mg IV q 6 hours prn N/V    4.  Debility -PT/OT following    5. GOC -DNR/Comfort Measures -per discussion with patient and daughter at bedside  -reviewed comfort measures including discontinuation of labs, IV fluids; focus on symptom management   -hospice following    P: Follow up visit for GOC and symptom management. Patient awake but drowsy, taking sips of water.  If patient is declining, may require adjustment to only IV medications for symptom management if patient unable to swallow pills, so will continue to follow closely.   Palliative Care Team will continue to follow patient. Please do not hesitate to contact us regarding further sx mgmt or GOC needs.  Chandni Sanders, APRN  10/11/2023  Time spent: 25 minutes

## 2023-10-12 PROCEDURE — 25010000002 HEPARIN (PORCINE) PER 1000 UNITS: Performed by: PHYSICIAN ASSISTANT

## 2023-10-12 PROCEDURE — 94799 UNLISTED PULMONARY SVC/PX: CPT

## 2023-10-12 RX ORDER — DIPHENHYDRAMINE HYDROCHLORIDE AND LIDOCAINE HYDROCHLORIDE AND ALUMINUM HYDROXIDE AND MAGNESIUM HYDRO
5 KIT EVERY 6 HOURS
Status: DISCONTINUED | OUTPATIENT
Start: 2023-10-12 | End: 2023-10-13 | Stop reason: HOSPADM

## 2023-10-12 RX ADMIN — DIPHENHYDRAMINE HYDROCHLORIDE AND LIDOCAINE HYDROCHLORIDE AND ALUMINUM HYDROXIDE AND MAGNESIUM HYDRO 5 ML: KIT at 11:35

## 2023-10-12 RX ADMIN — MIDODRINE HYDROCHLORIDE 10 MG: 10 TABLET ORAL at 16:55

## 2023-10-12 RX ADMIN — Medication 12.5 MG: at 09:16

## 2023-10-12 RX ADMIN — Medication 10 ML: at 09:19

## 2023-10-12 RX ADMIN — Medication 250 MG: at 22:02

## 2023-10-12 RX ADMIN — OXYCODONE HYDROCHLORIDE 5 MG: 5 TABLET ORAL at 16:55

## 2023-10-12 RX ADMIN — PANTOPRAZOLE SODIUM 40 MG: 40 TABLET, DELAYED RELEASE ORAL at 09:16

## 2023-10-12 RX ADMIN — RIFAXIMIN 550 MG: 550 TABLET ORAL at 22:02

## 2023-10-12 RX ADMIN — MIDODRINE HYDROCHLORIDE 10 MG: 10 TABLET ORAL at 09:17

## 2023-10-12 RX ADMIN — BUDESONIDE AND FORMOTEROL FUMARATE DIHYDRATE 2 PUFF: 160; 4.5 AEROSOL RESPIRATORY (INHALATION) at 21:39

## 2023-10-12 RX ADMIN — ROFLUMILAST 250 MCG: 500 TABLET ORAL at 09:52

## 2023-10-12 RX ADMIN — Medication 250 MG: at 09:16

## 2023-10-12 RX ADMIN — TRAZODONE HYDROCHLORIDE 150 MG: 50 TABLET ORAL at 22:02

## 2023-10-12 RX ADMIN — Medication 10 ML: at 22:04

## 2023-10-12 RX ADMIN — HEPARIN SODIUM 5000 UNITS: 5000 INJECTION, SOLUTION INTRAVENOUS; SUBCUTANEOUS at 22:02

## 2023-10-12 RX ADMIN — CITALOPRAM HYDROBROMIDE 20 MG: 20 TABLET ORAL at 09:16

## 2023-10-12 RX ADMIN — RIFAXIMIN 550 MG: 550 TABLET ORAL at 09:52

## 2023-10-12 RX ADMIN — OXYCODONE HYDROCHLORIDE 5 MG: 5 TABLET ORAL at 09:17

## 2023-10-12 RX ADMIN — MIDODRINE HYDROCHLORIDE 10 MG: 10 TABLET ORAL at 11:35

## 2023-10-12 RX ADMIN — ASPIRIN 81 MG CHEWABLE TABLET 81 MG: 81 TABLET CHEWABLE at 09:17

## 2023-10-12 RX ADMIN — Medication 12.5 MG: at 17:00

## 2023-10-12 RX ADMIN — HEPARIN SODIUM 5000 UNITS: 5000 INJECTION, SOLUTION INTRAVENOUS; SUBCUTANEOUS at 09:16

## 2023-10-12 RX ADMIN — SENNOSIDES AND DOCUSATE SODIUM 2 TABLET: 8.6; 5 TABLET ORAL at 09:17

## 2023-10-12 RX ADMIN — ATORVASTATIN CALCIUM 40 MG: 40 TABLET, FILM COATED ORAL at 22:02

## 2023-10-12 NOTE — PLAN OF CARE
Goal Outcome Evaluation:  Plan of Care Reviewed With: patient        Progress: no change  Outcome Evaluation: Pt. lying in bed asleep on 3LNC.  She woke to voice and endorsed abdominal and back pain at 6/10.  Pt had recently received scheduled oxy.  Pt. demonstated and endorsed increased wob at rest.  No PO intake documented.  CM following for disposition.  Palliative care to continue to follow for support, POC and ongoing GOC.

## 2023-10-12 NOTE — THERAPY DISCHARGE NOTE
Acute Care - Occupational Therapy Discharge  [facility]    Patient Name: Laura Hui  : 1950    MRN: [id number]                              Today's Date: 10/12/2023       Admit Date: 10/3/2023    Visit Dx:     ICD-10-CM ICD-9-CM   1. Community acquired pneumonia, unspecified laterality  J18.9 486   2. Hypoxia  R09.02 799.02   3. Bilateral pleural effusion  J90 511.9   4. Generalized weakness  R53.1 780.79   5. Hyponatremia  E87.1 276.1   6. Renal insufficiency  N28.9 593.9     Patient Active Problem List   Diagnosis    Abnormal thyroid blood test    Obesity (BMI 30.0-34.9)    Other emphysema    Major depressive disorder, recurrent, moderate    Atherosclerosis of native arteries of extremities with intermittent claudication, other extremity    Acute respiratory failure with hypoxia    GERD (gastroesophageal reflux disease)    Hyperlipidemia    Hypertension    Chronic obstructive pulmonary disease    CHF (congestive heart failure)    Anemia    Anxiety and depression    Chronic back pain    HEIDY (acute kidney injury)    Hyponatremia    Cirrhosis of liver    Generalized weakness    Cellulitis of right lower extremity    Severe malnutrition     Past Medical History:   Diagnosis Date    Acid reflux     Anemia     Due to chronic blood loss - Chronic blood loss anemia    Apnea     Arthritis     Backache     CHF (congestive heart failure)     COPD (chronic obstructive pulmonary disease)     Severe    Degeneration of lumbar intervertebral disc     Drug therapy     Finding    Emphysema, unspecified     Esophageal reflux     Family history of malignant neoplasm of breast in first degree relative     Gall stones     GERD (gastroesophageal reflux disease)     H/O spinal fusion     H/O: drug dependency     Hearing loss     Heart disease     Hypertrophic cardiomyopathy     Insomnia     Mixed hyperlipidemia     Neurotic Depression     Recurrent major depressive episodes, moderate     Severe obesity     Tobacco use      History of     Past Surgical History:   Procedure Laterality Date    APPENDECTOMY      BACK SURGERY      BACK SURGERY      CARDIAC ABLATION      CARDIAC SURGERY      Cardiac Stent Catherization    CHOLECYSTECTOMY      Gall Bladder Surgery    HERNIA REPAIR      HIP SURGERY Left     LAPAROSCOPIC TUBAL LIGATION      NECK SURGERY      NECK SURGERY      ORTHOPEDIC SURGERY        General Information       Row Name 10/12/23 1510          OT Time and Intention    Document Type discharge treatment  -TA     Mode of Treatment occupational therapy  -TA       Row Name 10/12/23 1510          General Information    Patient Profile Reviewed yes  -TA               User Key  (r) = Recorded By, (t) = Taken By, (c) = Cosigned By      Initials Name Provider Type    Erick Orosco, OT Occupational Therapist                   Mobility/ADL's    No documentation.                  Obj/Interventions    No documentation.                  Goals/Plan    No documentation.                  Clinical Impression       Row Name 10/12/23 1510          Plan of Care Review    Outcome Evaluation Pt now comfort measures only. No further skilled OT services per family request. Pt discharged from OT services this date.  -TA               User Key  (r) = Recorded By, (t) = Taken By, (c) = Cosigned By      Initials Name Provider Type    Erick Orosco, OT Occupational Therapist                   Outcome Measures       Row Name 10/12/23 0800          How much help from another person do you currently need...    Turning from your back to your side while in flat bed without using bedrails? 2  -CR     Moving from lying on back to sitting on the side of a flat bed without bedrails? 2  -CR     Moving to and from a bed to a chair (including a wheelchair)? 2  -CR     Standing up from a chair using your arms (e.g., wheelchair, bedside chair)? 2  -CR     Climbing 3-5 steps with a railing? 1  -CR     To walk in hospital room? 1  -CR     AM-PAC 6 Clicks  Score (PT) 10  -CR     Highest level of mobility 4 --> Transferred to chair/commode  -CR               User Key  (r) = Recorded By, (t) = Taken By, (c) = Cosigned By      Initials Name Provider Type    CR Jef Haney, RN Registered Nurse                  Occupational Therapy Education       Title: PT OT SLP Therapies (Done)       Topic: Occupational Therapy (Done)       Point: ADL training (Done)       Description:   Instruct learner(s) on proper safety adaptation and remediation techniques during self care or transfers.   Instruct in proper use of assistive devices.                  Learning Progress Summary             Patient Acceptance, E, VU by  at 10/7/2023 1506    Acceptance, E, VU by  at 10/5/2023 1101                         Point: Home exercise program (Done)       Description:   Instruct learner(s) on appropriate technique for monitoring, assisting and/or progressing therapeutic exercises/activities.                  Learning Progress Summary             Patient Acceptance, E, VU by  at 10/7/2023 1506                         Point: Precautions (Done)       Description:   Instruct learner(s) on prescribed precautions during self-care and functional transfers.                  Learning Progress Summary             Patient Acceptance, E, VU by  at 10/7/2023 1506                         Point: Body mechanics (Done)       Description:   Instruct learner(s) on proper positioning and spine alignment during self-care, functional mobility activities and/or exercises.                  Learning Progress Summary             Patient Acceptance, E, VU by  at 10/7/2023 1506                                         User Key       Initials Effective Dates Name Provider Type [facility] 02/03/23 -  Kiana Aj OT Occupational Therapist OT     11/30/22 -  Jeremiah Saleem, RN Registered Nurse Nurse                  OT Recommendation and Plan     Plan of Care Review  Outcome Evaluation: Pt now  comfort measures only. No further skilled OT services per family request. Pt discharged from OT services this date.  Outcome Evaluation: Pt now comfort measures only. No further skilled OT services per family request. Pt discharged from OT services this date.     Time Calculation:         Time Calculation- OT       Row Name 10/12/23 1510             Time Calculation- OT    OT Start Time 1510  ttc 0 minutes  -TA      Total Timed Code Minutes- OT 0 minute(s)  -TA      OT Received On 10/12/23  -TA                User Key  (r) = Recorded By, (t) = Taken By, (c) = Cosigned By      Initials Name Provider Type    Erick Orosco, OT Occupational Therapist                         OT Discharge Summary  Anticipated Discharge Disposition (OT): inpatient hospice  Reason for Discharge: Patient/Caregiver request  Outcomes Achieved: Refer to plan of care for updates on goals achieved  Discharge Destination: other (comment) (Hospice care)    Erick Pritchett OT  10/12/2023

## 2023-10-12 NOTE — PROGRESS NOTES
[facility]  PROGRESS NOTE    Patient Name: Laura Hui  : 1950  MRN: [id number]    Date of Admission: 10/3/2023  Primary Care Physician: Carmella Barboza DO    Subjective   Subjective     CC:  F/U cirrhosis    HPI:  Patient in bed. RN at bedside this morning. No overnight issues. Not eating much, but took few bites of dinner. Pain is controlled. No dyspnea- mouth pain and sore throat      Objective   Objective     Vital Signs:   Temp:  [97.8 øF (36.6 øC)-98 øF (36.7 øC)] 97.8 øF (36.6 øC)  Heart Rate:  [81-91] 91  Resp:  [16] 16  BP: (125-133)/(68-74) 133/68  Flow (L/min):  [2] 2     Physical Exam:  Gen-no acute distress, appears tired, chronically ill appearing   HENT-NCAT, mucous membranes dry  CV-RRR,   Resp-CTAB, unlabored, diminished at bases  Abd-soft, NT, no significant distention  Ext-no LE edema  Neuro-answers ROS and orientation questions. Seems to have some confused conversation situationally   Skin-no rashes  Psych-appropriate mood      Results Reviewed:  LAB RESULTS:      Lab 10/06/23  0346   WBC 9.40   HEMOGLOBIN 10.6*   HEMATOCRIT 31.3*   PLATELETS 140   MCV 94.3         Lab 10/06/23  0346   SODIUM 129*   POTASSIUM 4.5   CHLORIDE 95*   CO2 15.0*   ANION GAP 19.0*   BUN 30*   CREATININE 2.39*   EGFR 21.0*   GLUCOSE 74   CALCIUM 10.1   PHOSPHORUS 3.7         Lab 10/06/23  0346   TOTAL PROTEIN 5.5*   ALBUMIN 3.6   GLOBULIN 1.9   ALT (SGPT) 15   AST (SGOT) 40*   BILIRUBIN 0.9   ALK PHOS 164*                           Brief Urine Lab Results  (Last result in the past 365 days)        Color   Clarity   Blood   Leuk Est   Nitrite   Protein   CREAT   Urine HCG        10/05/23 1322 Yellow   Clear   Negative   Negative   Negative   Negative                   Microbiology Results Abnormal       Procedure Component Value - Date/Time    Blood Culture - Blood, Hand, Left [[id number]]  (Normal) Collected: 10/03/23 1684    Lab Status: Final result Specimen:  Blood from Hand, Left Updated: 10/08/23 1815     Blood Culture No growth at 5 days    Blood Culture - Blood, Arm, Right [[id number]]  (Normal) Collected: 10/03/23 1720    Lab Status: Final result Specimen: Blood from Arm, Right Updated: 10/08/23 1815     Blood Culture No growth at 5 days    MRSA Screen, PCR (Inpatient) - Swab, Nares [[id number]]  (Normal) Collected: 10/03/23 2327    Lab Status: Final result Specimen: Swab from Nares Updated: 10/04/23 0811     MRSA PCR Negative    Narrative:      The negative predictive value of this diagnostic test is high and should only be used to consider de-escalating anti-MRSA therapy. A positive result may indicate colonization with MRSA and must be correlated clinically.  MRSA Negative    Respiratory Panel PCR w/COVID-19(SARS-CoV-2) JEN/BETSY/ELIEZER/PAD/COR/MAD/URSULA In-House, NP Swab in UTM/VTM, 3-4 HR TAT - Swab, Nasopharynx [[id number]]  (Normal) Collected: 10/03/23 1726    Lab Status: Final result Specimen: Swab from Nasopharynx Updated: 10/03/23 1825     ADENOVIRUS, PCR Not Detected     Coronavirus 229E Not Detected     Coronavirus HKU1 Not Detected     Coronavirus NL63 Not Detected     Coronavirus OC43 Not Detected     COVID19 Not Detected     Human Metapneumovirus Not Detected     Human Rhinovirus/Enterovirus Not Detected     Influenza A PCR Not Detected     Influenza B PCR Not Detected     Parainfluenza Virus 1 Not Detected     Parainfluenza Virus 2 Not Detected     Parainfluenza Virus 3 Not Detected     Parainfluenza Virus 4 Not Detected     RSV, PCR Not Detected     Bordetella pertussis pcr Not Detected     Bordetella parapertussis PCR Not Detected     Chlamydophila pneumoniae PCR Not Detected     Mycoplasma pneumo by PCR Not Detected    Narrative:      In the setting of a positive respiratory panel with a viral infection PLUS a negative procalcitonin without other underlying concern for bacterial infection, consider observing off antibiotics or discontinuation of antibiotics  and continue supportive care. If the respiratory panel is positive for atypical bacterial infection (Bordetella pertussis, Chlamydophila pneumoniae, or Mycoplasma pneumoniae), consider antibiotic de-escalation to target atypical bacterial infection.            No radiology results from the last 24 hrs        Current medications:  Scheduled Meds:aspirin, 81 mg, Oral, Daily  atorvastatin, 40 mg, Oral, Nightly  budesonide-formoterol, 2 puff, Inhalation, BID - RT   And  tiotropium bromide monohydrate, 2 puff, Inhalation, Daily - RT  citalopram, 20 mg, Oral, Daily  First Mouthwash (Magic Mouthwash), 5 mL, Swish & Spit, Q6H  heparin (porcine), 5,000 Units, Subcutaneous, Q12H  metoprolol tartrate, 12.5 mg, Oral, BID With Meals  midodrine, 10 mg, Oral, TID AC  oxyCODONE, 5 mg, Oral, Q8H  pantoprazole, 40 mg, Oral, Daily  rifAXIMin, 550 mg, Oral, Q12H  roflumilast, 250 mcg, Oral, Daily  saccharomyces boulardii, 250 mg, Oral, BID  senna-docusate sodium, 2 tablet, Oral, BID  sodium chloride, 10 mL, Intravenous, Q12H  traZODone, 150 mg, Oral, Nightly      Continuous Infusions:   PRN Meds:.  acetaminophen    senna-docusate sodium **AND** polyethylene glycol **AND** bisacodyl **AND** bisacodyl    Calcium Replacement - Follow Nurse / BPA Driven Protocol    glycopyrrolate    HYDROmorphone    hydrOXYzine    Magnesium Low Dose Replacement - Follow Nurse / BPA Driven Protocol    melatonin    ondansetron    oxyCODONE    Phosphorus Replacement - Follow Nurse / BPA Driven Protocol    Potassium Replacement - Follow Nurse / BPA Driven Protocol    sodium chloride    sodium chloride    Assessment & Plan   Assessment & Plan     Active Hospital Problems    Diagnosis  POA    **Acute respiratory failure with hypoxia [J96.01]  Yes    Severe malnutrition [E43]  Yes    CHF (congestive heart failure) [I50.9]  Yes    Anemia [D64.9]  Yes    Anxiety and depression [F41.9, F32.A]  Yes    Chronic back pain [M54.9, G89.29]  Yes    HEIDY (acute kidney  injury) [N17.9]  Yes    Hyponatremia [E87.1]  Yes    Cirrhosis of liver [K74.60]  Yes    Generalized weakness [R53.1]  Yes    Cellulitis of right lower extremity [L03.115]  Yes    GERD (gastroesophageal reflux disease) [K21.9]  Yes    Hypertension [I10]  Yes    Atherosclerosis of native arteries of extremities with intermittent claudication, other extremity [I70.218]  Yes    Chronic obstructive pulmonary disease [J44.9]  Yes    Hyperlipidemia [E78.5]  Yes      Resolved Hospital Problems   No resolved problems to display.        Brief Hospital Course to date:  Laura Hui is a 73 y.o. female with hx of HTN, HLD, HFpEF, PVD, COPD, chronic back pain, GERD, anemia, and anxiety/depression who presents due to dyspnea x 3 days and weakness. CTA chest demonstrated moderate left effusion and smaller right effusion; cirrhotic liver with large ascites noted. Liver US with cirrhosis, ascites and right pleural effusion. Diagnostic and therapeutic paracentesis was performed on 10/4/23 with 3.3 L removed. SAAG consistent with portal HTN. No evidence of SBP. Creatinine elevated on presentation, noted to be 1.41. Urine sodium less than 20. UA negative. She received albumin and midodrine without improvement. After further discussion with patient/family, decision made to proceed with comfort measures. Palliative Care and Hospice following.     This patient's problems and plans were partially entered by my partner and updated as appropriate by me 10/12/23.   Goals of care  -Hospice following, currently on a comfort focused plan of care  -discussed with daughter today and Genie DAMICO. Looking for outpatient hospice services. Information given in regards to caregiver agencies at home and SNF options.  -No further lab checks or fingersticks, no aggressive measures  -Continue pain control with scheduled and PRN Oxycodone and IV Dilauidid PRN (has not required IV)  -Per GI, may benefit from paracentesis before discharge, could consider  peritoneal drain placement- monitor for need  -add magic mouth wash for mouth pain/ sore throat     Left > right pleural effusion  Cirrhosis/ascites - new finding   Hypoalbuminemia  - GI followed during admission  - s/p paracentesis 10/4/23 with 3.3 L removed  - Thoracentesis deferred as SOA improved following paracentesis  - ATBx discontinued (had been started on empirically for PNA, but low suspicion for active infection)  - Now comfort measures    HEIDY, hepatorenal syndrome  - Likely hepatorenal. Urine Na < 20. UA negative. S/p albumin and midodrine with no improvement.   - Nephrology followed during admission  - Cr trending up, no further checks given GOC  - Vance for comfort     Dyspnea and hypoxia   - She was recently admitted at [facility] and diagnosed with CHF, but had Echo showing EF 75%; diuretic was increased   - CTA chest with no PE, large left pleural effusion and moderate right pleural effusion, atelectasis  - Improvement in respiratory status following paracentesis.   - Initially on Doxycycline/Rocephin, discontinued due to low suspicion for pneumonia     Possible HFpEF  HTN   HLD  - Recent admission with Echo at [facility]  - Currently holding Bumex due to HEIDY, decreased intake/ euvolemia  - metoprolol 12.5mg bid, hold parameters  - Continue statin     Acute onset weakness  - Until a couple weeks ago she could walk without a cane or walker  - Now using a wheelchair     Lightheadedness  Borderline hypotension  - Holding Bumex  - s/p albumin and midodrine without improvement, remains on midodrine currently TID  - metoprolol restarted- BP stable     COPD, emphysema   - Not on supplemental oxygen at home  - Former smoker, now vapes. Encouraged cessation.  - Continue with inhalers      Hyponatremia, hypervolemic   - Cortisol appropriate, TSH mildly elevated at 4.9, T4 normal  - Stopped lab draws     PVD  - Incidental finding of atherosclerosis of left subclavian artery, suggesting high-grade stenosis, or possibly  occlusion  - Defer Vascular Surgery consult given [facility]     Generalized weakness  - PT/OT attempted, but patient not participating at this time     Anemia  - No further lab draws     GERD  - PPI     Anxiety/depression  - Celexa, Trazodone       Expected Discharge Location and Transportation: home with hospice vs inpatient hospice  Expected Discharge   Expected Discharge Date: 10/13/2023; Expected Discharge Time:      DVT prophylaxis:  Medical DVT prophylaxis orders are present.     AM-PAC 6 Clicks Score (PT): 11 (10/11/23 0800)    CODE STATUS:   Code Status and Medical Interventions:   Ordered at: 10/09/23 1506     Level Of Support Discussed With:    Patient    Next of Kin (If No Surrogate)     Code Status (Patient has no pulse and is not breathing):    No CPR (Do Not Attempt to Resuscitate)     Medical Interventions (Patient has pulse or is breathing):    Comfort Measures       Priti Red, APRN  10/12/23  More than 50% of time spent counseling on current illness and plan of care. Case discussed with: RN, MSW, patient and daughter  Total time spent face to face with the patient was 30 minutes.  Total time of the encounter was 60 minutes.

## 2023-10-12 NOTE — PLAN OF CARE
Problem: Adult Inpatient Plan of Care  Goal: Plan of Care Review  Recent Flowsheet Documentation  Taken 10/12/2023 1510 by Erick Pritchett, OT  Outcome Evaluation: Pt now comfort measures only. No further skilled OT services per family request. Pt discharged from OT services this date.   Goal Outcome Evaluation:              Outcome Evaluation: Pt now comfort measures only. No further skilled OT services per family request. Pt discharged from OT services this date.

## 2023-10-12 NOTE — PROGRESS NOTES
Continued Stay Note  [facility]     Patient Name: Laura Hui  MRN: [id number]  Today's Date: 10/12/2023    Admit Date: 10/3/2023    Plan: Social work met with the patient and gave her a list of skilled nursing facilities and called and spoke with her daughter Mary Rodríguez at phone [phone] and she said that she would like to have her mother go home when she is medically ready for discharge. Case management spoke with hospice and they are going to see the patient and her daughter today to discuss home with hospice. A caregiver list will be given to the patients daughter.   Discharge Plan       Row Name 10/12/23 1834       Plan    Plan Social work met with the patient and gave her a list of skilled nursing facilities and called and spoke with her daughter Mary Rodríguez at phone [phone] and she said that she would like to have her mother go home when she is medically ready for discharge. Case management spoke with hospice and they are going to see the patient and her daughter today to discuss home with hospice. A caregiver list will be given to the patients daughter.                   Discharge Codes    No documentation.                 Expected Discharge Date and Time       Expected Discharge Date Expected Discharge Time    Oct 13, 2023               M[facility] [place]     Increased Nutrient Needs...

## 2023-10-12 NOTE — PROGRESS NOTES
Visit made with pt and dtr, Mary. Reviewed hospice plan of care and goals of care. Confirmed comfort focused plan of care and no desire for further aggressive treatment. Overview of hospice services provided. Pt/Family wishes for location of care to be home with dtr at [address], [facility] [address]. Dtr requested a list of local caregivers and states they will be using caregivers when Mary or family cannot be home. Currently working on EMS/DNR   Discharge Plan: Still waiting on EMS pickup time. DME to be delivered 10/13 before noon and includes hospital bed, OBT, and O2. Provider aware of need for 3-5 day supply of comfort meds to be sent home via meds to beds in hand prior to discharge.   24 hr Hospice number given to HCS who understands to call hospice once pt arrives home to be admitted to hospice services. Team and bedside RN updated. Please call [phone] if can be of further services.    Jo Ann Fernandez, RN, BSN, Hospice Liaison

## 2023-10-13 VITALS
SYSTOLIC BLOOD PRESSURE: 129 MMHG | RESPIRATION RATE: 18 BRPM | OXYGEN SATURATION: 94 % | TEMPERATURE: 97.8 F | HEIGHT: 65 IN | WEIGHT: 177.2 LBS | DIASTOLIC BLOOD PRESSURE: 68 MMHG | BODY MASS INDEX: 29.52 KG/M2 | HEART RATE: 85 BPM

## 2023-10-13 RX ORDER — TRAZODONE HYDROCHLORIDE 100 MG/1
TABLET ORAL
Qty: 180 TABLET | Refills: 1 | OUTPATIENT
Start: 2023-10-13

## 2023-10-13 RX ORDER — LACTULOSE 10 G/15ML
30 SOLUTION ORAL
Qty: 946 ML | Refills: 0 | Status: SHIPPED | OUTPATIENT
Start: 2023-10-13

## 2023-10-13 RX ORDER — HYDROXYZINE HYDROCHLORIDE 25 MG/1
25 TABLET, FILM COATED ORAL 3 TIMES DAILY PRN
Qty: 15 TABLET | Refills: 0 | Status: SHIPPED | OUTPATIENT
Start: 2023-10-13

## 2023-10-13 RX ORDER — BUMETANIDE 1 MG/1
1 TABLET ORAL 2 TIMES DAILY PRN
Start: 2023-10-13

## 2023-10-13 RX ORDER — MIDODRINE HYDROCHLORIDE 10 MG/1
10 TABLET ORAL
Qty: 90 TABLET | Refills: 0 | Status: SHIPPED | OUTPATIENT
Start: 2023-10-13 | End: 2023-11-12

## 2023-10-13 RX ORDER — OXYCODONE HYDROCHLORIDE 5 MG/1
5 TABLET ORAL EVERY 8 HOURS
Qty: 21 TABLET | Refills: 0 | Status: SHIPPED | OUTPATIENT
Start: 2023-10-13

## 2023-10-13 RX ORDER — TRAZODONE HYDROCHLORIDE 150 MG/1
150 TABLET ORAL NIGHTLY
Qty: 30 TABLET | Refills: 0 | Status: SHIPPED | OUTPATIENT
Start: 2023-10-13

## 2023-10-13 NOTE — DISCHARGE SUMMARY
[facility] Medicine Services  DISCHARGE SUMMARY    Patient Name: Laura Hui  : 1950  MRN: [id number]    Date of Admission: 10/3/2023  5:08 PM  Date of Discharge:  10/13/2023  Primary Care Physician: Carmella Barboza DO    Consults       Date and Time Order Name Status Description    10/5/2023  2:15 PM Inpatient Palliative Care MD Consult Completed     10/5/2023  8:06 AM Inpatient Nephrology Consult Completed     10/4/2023 12:26 AM Inpatient Gastroenterology Consult Completed             Hospital Course     Presenting Problem: f/u cirrhosis     Active Hospital Problems    Diagnosis  POA    **Acute respiratory failure with hypoxia [J96.01]  Yes    Severe malnutrition [E43]  Yes    CHF (congestive heart failure) [I50.9]  Yes    Anemia [D64.9]  Yes    Anxiety and depression [F41.9, F32.A]  Yes    Chronic back pain [M54.9, G89.29]  Yes    HEIDY (acute kidney injury) [N17.9]  Yes    Hyponatremia [E87.1]  Yes    Cirrhosis of liver [K74.60]  Yes    Generalized weakness [R53.1]  Yes    Cellulitis of right lower extremity [L03.115]  Yes    GERD (gastroesophageal reflux disease) [K21.9]  Yes    Hypertension [I10]  Yes    Atherosclerosis of native arteries of extremities with intermittent claudication, other extremity [I70.218]  Yes    Chronic obstructive pulmonary disease [J44.9]  Yes    Hyperlipidemia [E78.5]  Yes      Resolved Hospital Problems   No resolved problems to display.          Hospital Course:  Laura Hui is a 73 y.o. female  with hx of HTN, HLD, HFpEF, PVD, COPD, chronic back pain, GERD, anemia, and anxiety/depression who presents due to dyspnea x 3 days and weakness. CTA chest demonstrated moderate left effusion and smaller right effusion; cirrhotic liver with large ascites noted. Liver US with cirrhosis, ascites and right pleural effusion. Diagnostic and therapeutic paracentesis was performed on 10/4/23 with 3.3 L removed. SAAG consistent with portal  HTN. No evidence of SBP. Creatinine elevated on presentation, noted to be 1.41. Urine sodium less than 20. UA negative. She received albumin and midodrine without improvement. After further discussion with patient/family, decision made to proceed with comfort measures. Palliative Care and Hospice following.      Goals of care  -Hospice following, currently on a comfort focused plan of care  -discussed with daughter today and MSW Genie. Plan for home with hospice, supplies being delivered today   -No further lab checks or fingersticks, no aggressive measures  -Continue pain control with scheduled and PRN Oxycodone   -Per GI, may benefit from paracentesis before discharge, could consider peritoneal drain placement- monitor for need  -add magic mouth wash for mouth pain/ sore throat     Left > right pleural effusion  Cirrhosis/ascites - new finding   Hypoalbuminemia  - GI followed during admission  - s/p paracentesis 10/4/23 with 3.3 L removed  - Thoracentesis deferred as SOA improved following paracentesis  - ATBx discontinued (had been started on empirically for PNA, but low suspicion for active infection)  - Now comfort measures  -- bumex changed to bid prn for soa and weight gain to help with comfort   -- Xifaxan changed to lactulose prn per hospice recommendations to be more cost effective      HEIDY, hepatorenal syndrome  - Likely hepatorenal. Urine Na < 20. UA negative. S/p albumin and midodrine with no improvement.   - Nephrology followed during admission  - Cr trending up, no further checks given JOSE F Vance for comfort     Dyspnea and hypoxia   - She was recently admitted at [facility] [place] and diagnosed with CHF, but had Echo showing EF 75%; diuretic was increased   - CTA chest with no PE, large left pleural effusion and moderate right pleural effusion, atelectasis  - Improvement in respiratory status following paracentesis.   - Initially on Doxycycline/Rocephin, discontinued due to low suspicion for pneumonia      Possible HFpEF  HTN   HLD  - Recent admission with Echo at [facility] [place]  -bumex held due to [place], [facility] or [facility]   - metoprolol 12.5mg bid, hold parameters  - Continue statin     Acute onset weakness  - Until a couple weeks ago she could walk without a cane or walker  - Now using a wheelchair     Lightheadedness  Borderline hypotension  - Holding Bumex  - s/p albumin and midodrine without improvement, remains on midodrine currently TID  - metoprolol restarted- BP stable     COPD, emphysema   - Not on supplemental oxygen at home  - Former smoker, now vapes. Encouraged cessation.  - Continue with inhalers      Hyponatremia, hypervolemic   - Cortisol appropriate, TSH mildly elevated at 4.9, T4 normal  - Stopped lab draws     PVD  - Incidental finding of atherosclerosis of left subclavian artery, suggesting high-grade stenosis, or possibly occlusion  - Defer Vascular Surgery consult given GOC     Generalized weakness  - PT/OT attempted, but patient not participating at this time     Anemia  - No further lab draws     GERD  - PPI     Anxiety/depression  - Celexa, Trazodone       Patient will be discharged home today with hospice.       Discharge Follow Up Recommendations for outpatient labs/diagnostics:   Follow up with pcp as needed    Day of Discharge     HPI:   Patient is resting in bed in NAD. She was asking if she is going home today. No acute events overnight per nursing     Review of Systems  Gen- No fevers, chills  CV- No chest pain, palpitations  Resp- No cough, dyspnea  GI- No N/V/D, abd pain      Vital Signs:   Temp:  [97.4 øF (36.3 øC)-98 øF (36.7 øC)] 97.8 øF (36.6 øC)  Heart Rate:  [76-87] 85  Resp:  [16-18] 18  BP: (129-135)/(68-74) 129/68  Flow (L/min):  [2] 2      Physical Exam:  Constitutional: No acute distress, awake, alert, chronically ill appearing   HENT: NCAT, mucous membranes moist  Respiratory: Clear to auscultation bilaterally, respiratory effort normal 2L  92%  Cardiovascular: RRR, no murmurs, rubs, or gallops  Gastrointestinal: Positive bowel sounds, soft, nontender, nondistended  Musculoskeletal: No bilateral ankle edema  Psychiatric: Appropriate affect, cooperative  Neurologic: Oriented x 2, some confusion noted, strength symmetric in all extremities, Cranial Nerves grossly intact to confrontation, speech clear  Skin: No rashes, bruising ashley arms       Pertinent  and/or Most Recent Results     LAB RESULTS:                              Brief Urine Lab Results  (Last result in the past 365 days)        Color   Clarity   Blood   Leuk Est   Nitrite   Protein   CREAT   Urine HCG        10/05/23 1322 Yellow   Clear   Negative   Negative   Negative   Negative                 Microbiology Results (last 10 days)       Procedure Component Value - Date/Time    MRSA Screen, PCR (Inpatient) - Swab, Nares [[id number]]  (Normal) Collected: 10/03/23 2327    Lab Status: Final result Specimen: Swab from Nares Updated: 10/04/23 0811     MRSA PCR Negative    Narrative:      The negative predictive value of this diagnostic test is high and should only be used to consider de-escalating anti-MRSA therapy. A positive result may indicate colonization with MRSA and must be correlated clinically.  MRSA Negative    Blood Culture - Blood, Hand, Left [[id number]]  (Normal) Collected: 10/03/23 1804    Lab Status: Final result Specimen: Blood from Hand, Left Updated: 10/08/23 1815     Blood Culture No growth at 5 days    Respiratory Panel PCR w/COVID-19(SARS-CoV-2) JEN/BETSY/ELIEZER/PAD/COR/MAD/URSULA In-House, NP Swab in UTM/VTM, 3-4 HR TAT - Swab, Nasopharynx [[id number]]  (Normal) Collected: 10/03/23 1726    Lab Status: Final result Specimen: Swab from Nasopharynx Updated: 10/03/23 1825     ADENOVIRUS, PCR Not Detected     Coronavirus 229E Not Detected     Coronavirus HKU1 Not Detected     Coronavirus NL63 Not Detected     Coronavirus OC43 Not Detected     COVID19 Not Detected     Human Metapneumovirus  Not Detected     Human Rhinovirus/Enterovirus Not Detected     Influenza A PCR Not Detected     Influenza B PCR Not Detected     Parainfluenza Virus 1 Not Detected     Parainfluenza Virus 2 Not Detected     Parainfluenza Virus 3 Not Detected     Parainfluenza Virus 4 Not Detected     RSV, PCR Not Detected     Bordetella pertussis pcr Not Detected     Bordetella parapertussis PCR Not Detected     Chlamydophila pneumoniae PCR Not Detected     Mycoplasma pneumo by PCR Not Detected    Narrative:      In the setting of a positive respiratory panel with a viral infection PLUS a negative procalcitonin without other underlying concern for bacterial infection, consider observing off antibiotics or discontinuation of antibiotics and continue supportive care. If the respiratory panel is positive for atypical bacterial infection (Bordetella pertussis, Chlamydophila pneumoniae, or Mycoplasma pneumoniae), consider antibiotic de-escalation to target atypical bacterial infection.    Blood Culture - Blood, Arm, Right [[id number]]  (Normal) Collected: 10/03/23 1720    Lab Status: Final result Specimen: Blood from Arm, Right Updated: 10/08/23 1815     Blood Culture No growth at 5 days            US Paracentesis    Result Date: 10/5/2023  US PARACENTESIS  History: new ascites  : Demetrius Izquierdo MD.  Modality: Ultrasonography                   Sedation: None. Anesthesia: Lidocaine 1% local infiltration. Estimated blood loss:  0 cc.        Technique: A thorough discussion of the risks, benefits, and alternatives of the procedure, and if applicable, moderate sedation, was carried out with the patient. They were encouraged to ask any questions. Any questions were answered. They verbalized understanding. A written informed consent was then signed.  A multi-component timeout was performed prior to starting the procedure using the departmental protocol. The procedure room personnel used personal protective equipment. The  operators used sterile gloves and if indicated, sterile gowns. The surgical site was prepped with chlorhexidine gluconate  and draped in the maximal applicable sterile fashion. A preliminary ultrasonography was performed to assess the target and determine a safe access site. It showed ascites. Pertinent ultrasound images were stored to the PACS for documentation. The access site was sterilely prepped and draped. Local anesthesia was administered. A dermatotomy was performed if needed. A catheter over the needle system was advanced into the peritoneal cavity. Straw colored fluid was aspirated and the plastic catheter advanced into the peritoneum. The catheter was then connected to a fluid recovery system. At the end of the procedure, the catheter was withdrawn and an aseptic dressing applied. The patient tolerated the procedure well. After uneventful recovery recovery, the patient was discharged from the department in stable condition. The total amount of fluid recovered is given below. Albumin was infused intravenously if ordered by the referring doctor. Complications: None immediate. Specimen: The specimen was labeled and sent to the lab in appropriate carriers & containers if such was requested by the ordering provider.     Impression:                                                              Successful ultrasound-guided paracentesis using a Right lower quadrant access with recovery of 3.3 liters of fluid as described above. Thank you for the opportunity to assist in the care of your patient. Electronically Signed: Demetrius Izquierdo MD  10/5/2023 8:31 AM EDT  Workstation ID: [id number]    XR Chest 1 View    Result Date: 10/5/2023  XR CHEST 1 VW Date of Exam: 10/5/2023 3:26 AM CDT Indication: Pleural effusion Comparison: 10/3/2023 Findings: Cardiomediastinal silhouette is unchanged. Persistent pulmonary vascular congestion and interstitial edema. There is central pulmonary edema with small to moderate left and small  right pleural effusions. No new airspace disease nor pneumothorax. No acute  osseous abnormality identified.     Impression: Moderate CHF/volume overload features are similar to prior exam. Electronically Signed: Isaac Smalls MD  10/5/2023 7:00 AM CDT  Workstation ID: [id number]    US Liver    Result Date: 10/4/2023  US LIVER Date of Exam: 10/4/2023 7:00 AM CDT Indication: new cirrhosis. Comparison: No comparisons available. Technique: Grayscale and color Doppler ultrasound evaluation of the right upper quadrant was performed. Findings: LIVER: Nodular contours and heterogeneous parenchymal echotexture consistent with cirrhosis. No focal lesions identified. Normal flow is present within the hepatic vasculature. GALLBLADDER: Gallbladder is surgically absent. The common bile duct measures 2 mm in diameter, normal. PANCREAS:  Visualized portions are unremarkable. AORTA/IVC:  Visualized portions are unremarkable. RIGHT KIDNEY:  Normal in size with no focal lesions. No evidence of nephrolithiasis or hydronephrosis. There is moderate volume ascites. There is partial visualization of a right pleural effusion.     Impression: 1.Cirrhosis with ascites and right pleural effusion. Electronically Signed: Isaac Smalls MD  10/4/2023 7:34 AM CDT  Workstation ID: [id number]    CT Angiogram Chest    Result Date: 10/3/2023  CT ANGIOGRAM CHEST Date of Exam: 10/3/2023 7:07 PM EDT Indication: SOB, hypoxic, elevated dimer. Comparison: None available. Technique: CTA of the chest was performed after the uneventful intravenous administration of 85 mL Isovue 370. Reconstructed coronal and sagittal images were also obtained. In addition, a 3-D volume rendered image was created for interpretation. Automated exposure control and iterative reconstruction methods were used. Findings: There is generally good contrast opacification of the pulmonary arteries and no evidence of embolic disease. No evidence of thoracic aortic aneurysm or dissection is  seen. There is probably high-grade origin stenosis of the left subclavian artery, with very dense calcification of the lumen on image 26 series 4. No pericardial effusion or mediastinal adenopathy is seen. There is diffuse coronary artery calcium. Multiple macrocalcifications are seen in the normal sized thyroid There is a large free-flowing left pleural effusion and moderate free-flowing right pleural effusion. There is nearly complete atelectasis of the left lower lobe and milder right lower lobe atelectasis. Mild micro bullous change is seen of the upper lobes. No other evidence of active pulmonary parenchymal disease is appreciated. There is a calcified right middle lobe granuloma. The airways appear to be normally patent. Included images of the upper abdomen show extensive ascites. Liver morphology suggests cirrhosis, with marked enlargement of the left liver lobe and small right liver lobe. There appear to be clips in the gallbladder fossa. Spleen is normal in size. Bony structures appear to be intact.     Impression: 1. No evidence of pulmonary embolic disease. 2. Large free-flowing left pleural effusion and moderate right effusion. 3. Extensive atelectasis of the left lower lobe due to effusion and mild right lower lobe atelectasis. 4. Liver morphology consistent with cirrhosis. Upper abdominal ascites, on these limited images, appears to be extensive. 5. Incidentally noted heavy calcification of the left subclavian artery origin, suggesting high-grade stenosis, or possibly occlusion. Electronically Signed: Brian Sutherland MD  10/3/2023 7:29 PM EDT  Workstation ID: [id number]    CT Head Without Contrast    Result Date: 10/3/2023  CT HEAD WO CONTRAST Date of Exam: 10/3/2023 7:07 PM EDT Indication: gen weakness. Comparison: None available. Technique: Axial CT images were obtained of the head without contrast administration.  Automated exposure control and iterative construction methods were used. FINDINGS: There is  no evidence for acute intracranial hemorrhage. No definitive acute focal ischemia is identified. Nonspecific white matter changes are noted likely related to chronic small vessel ischemic changes and age-related changes. Associated diffuse volume loss is observed. There is no evidence for abnormal cerebral edema. There is no mass effect or midline shift. The ventricular system is nondilated. The basal cisterns are patent. The skull is intact. The paranasal sinuses and mastoid air cells are  clear.     1.No evidence for acute intracranial abnormality. 2.Nonspecific white matter changes are noted with associated diffuse volume loss. These findings are likely related to chronic small vessel ischemic changes and/or age-related changes. Electronically Signed: Peter Don MD  10/3/2023 7:13 PM EDT  Workstation ID: [id number]    XR Chest 1 View    Result Date: 10/3/2023  XR CHEST 1 VW Date of Exam: 10/3/2023 4:12 PM CDT Indication: gen weakness Comparison: 7/19/2023 Findings: Cardiomediastinal silhouette is unremarkable. There is left mid to lower lung airspace disease suggestive of pneumonia. Small to moderate left and small right effusions. No acute osseous abnormality identified.     Impression: 1.Left mid to lower lung airspace disease with small to moderate effusion suggestive of pneumonia. Correlate with symptoms. 2.Persistent small right effusion. Electronically Signed: Isaac Smalls MD  10/3/2023 4:28 PM CDT  Workstation ID: [id number]                 Plan for Follow-up of Pending Labs/Results:     Discharge Details        Discharge Medications        New Medications        Instructions Start Date   First Mouthwash (Magic Mouthwash) suspension   5 mL, Swish & Spit, Every 6 Hours      hydrOXYzine 25 MG tablet  Commonly known as: ATARAX   25 mg, Oral, 3 Times Daily PRN      lactulose 10 GM/15ML solution  Commonly known as: CHRONULAC   30 g, Oral, Every 2 Hours PRN      magic mouthwash oral suspension   5 mL, Swish &  Spit, Every 6 Hours      midodrine 10 MG tablet  Commonly known as: PROAMATINE   10 mg, Oral, 3 Times Daily Before Meals      oxyCODONE 5 MG immediate release tablet  Commonly known as: ROXICODONE   5 mg, Oral, Every 8 Hours             Changes to Medications        Instructions Start Date   bumetanide 1 MG tablet  Commonly known as: BUMEX  What changed:   when to take this  reasons to take this   1 mg, Oral, 2 Times Daily PRN      metoprolol tartrate 25 MG tablet  Commonly known as: LOPRESSOR  What changed:   medication strength  how much to take   12.5 mg, Oral, 2 Times Daily With Meals      traZODone 150 MG tablet  Commonly known as: DESYREL  What changed:   medication strength  how much to take   150 mg, Oral, Nightly             Continue These Medications        Instructions Start Date   albuterol (2.5 MG/3ML) 0.083% nebulizer solution  Commonly known as: PROVENTIL   2.5 mg, Nebulization, Every 4 Hours PRN      albuterol sulfate  (90 Base) MCG/ACT inhaler  Commonly known as: PROVENTIL HFA;VENTOLIN HFA;PROAIR HFA   2 puffs, Inhalation, Every 4 Hours PRN      aspirin 81 MG chewable tablet   81 mg, Oral, Daily      atorvastatin 40 MG tablet  Commonly known as: Lipitor   40 mg, Oral, Nightly      citalopram 20 MG tablet  Commonly known as: CeleXA   TAKE ONE TABLET BY MOUTH DAILY      Daliresp 250 MCG tablet tablet  Generic drug: roflumilast   TAKE ONE TABLET BY MOUTH DAILY      fluticasone 50 MCG/ACT nasal spray  Commonly known as: FLONASE   SPRAY TWO SPRAYS IN EACH NOSTRIL ONCE DAILY      multivitamin with fluoride/iron 0.25-10 MG/ML solution solution   Oral, Daily      pantoprazole 40 MG EC tablet  Commonly known as: Protonix   40 mg, Oral, Daily      Trelegy Ellipta 100-62.5-25 MCG/ACT inhaler  Generic drug: Fluticasone-Umeclidin-Vilant   Inhale 1 puff Daily.             Stop These Medications      KLOR-CON 20 MEQ CR tablet  Generic drug: potassium chloride              Allergies   Allergen Reactions     Sulfa Antibiotics Rash         Discharge Disposition:  Hospice/Home    Diet:  Hospital:  Diet Order   Procedures    Diet: Cardiac Diets; Healthy Heart (2-3 Na+); Texture: Regular Texture (IDDSI 7); Fluid Consistency: Thin (IDDSI 0)       Diet Instructions       Diet: Regular/House Diet; Regular Texture (IDDSI 7); Thin (IDDSI 0)      Discharge Diet: Regular/House Diet    Texture: Regular Texture (IDDSI 7)    Fluid Consistency: Thin (IDDSI 0)             Activity:  Activity Instructions       Activity as Tolerated              Restrictions or Other Recommendations:         CODE STATUS:    Code Status and Medical Interventions:   Ordered at: 10/09/23 1506     Level Of Support Discussed With:    Patient    Next of Kin (If No Surrogate)     Code Status (Patient has no pulse and is not breathing):    No CPR (Do Not Attempt to Resuscitate)     Medical Interventions (Patient has pulse or is breathing):    Comfort Measures       Future Appointments   Date Time Provider Department Center   10/13/2023  7:30 PM EMS 1 [facility]   10/19/2023 11:00 AM Carmella Barboza DO [facility]   10/23/2023  2:00 PM Quique Nicholas III, MD [facility]       Additional Instructions for the Follow-ups that You Need to Schedule       Discharge Follow-up with PCP   As directed       Currently Documented PCP:    Carmella Barboza DO    PCP Phone Number:    [phone]     Follow Up Details: follow up with pcp as needed                      Sandie Garzon, APRMARANDA  10/13/23      Time Spent on Discharge:  I spent  45  minutes on this discharge activity which included: face-to-face encounter with the patient, reviewing the data in the system, coordination of the care with the nursing staff as well as consultants, documentation, and entering orders.

## 2023-10-13 NOTE — PLAN OF CARE
Problem: Adult Inpatient Plan of Care  Goal: Plan of Care Review  Outcome: Ongoing, Progressing  Goal: Patient-Specific Goal (Individualized)  Outcome: Ongoing, Progressing  Goal: Absence of Hospital-Acquired Illness or Injury  Outcome: Ongoing, Progressing  Intervention: Identify and Manage Fall Risk  Goal: Optimal Comfort and Wellbeing  Outcome: Ongoing, Progressing  Goal: Readiness for Transition of Care  Outcome: Ongoing, Progressing   Goal Outcome Evaluation:     Patient had no acute events during shift  Patient presents lethargic  Requested staff vikash her sleep and not wake her for 0001 pain control  Maintaining comfort care

## 2023-10-13 NOTE — PROGRESS NOTES
Continued Stay Note  [facility]     Patient Name: Laura Hui  MRN: [id number]  Today's Date: 10/13/2023    Admit Date: 10/3/2023  Case Management Discharge Note      Final Note: The patient will be discharged home with her family with hospice.         Selected Continued Care - Admitted Since 10/3/2023       Destination    No services have been selected for the patient.                Durable Medical Equipment    No services have been selected for the patient.                Dialysis/Infusion    No services have been selected for the patient.                Home Medical Care Coordination complete.      Service Provider Selected Services Address Phone Fax Patient Preferred    [facility] [address] [place] [address] [phone] --              Therapy    No services have been selected for the patient.                Community Resources    No services have been selected for the patient.                Community & DME    No services have been selected for the patient.                         Final Discharge Disposition Code: 50 - home with hospice   Discharge Plan       Row Name 10/13/23 1333       Plan    Final Discharge Disposition Code 50 - home with hospice    Final Note The patient will be discharged home with her family with hospice.                   Discharge Codes    No documentation.                 Expected Discharge Date and Time       Expected Discharge Date Expected Discharge Time    Oct 13, 2023               MOOKIE [place]

## 2023-10-13 NOTE — DISCHARGE PLACEMENT REQUEST
"Rose Hui (73 y.o. Female)    Discharge Summary   Date of Birth   1950    Social Security Number       Address   [address] [place] [address]    Home Phone   [phone]    MRN   [id number]       Congregation   Non-Baptism    Marital Status   Single                            Admission Date   10/3/23    Admission Type   Emergency    Admitting Provider   Jolanta Poon MD    Attending Provider   Jolanta Poon MD    Department, Room/Bed   [address], S511/1       Discharge Date       Discharge Disposition   Hospice/Home    Discharge Destination                                 Attending Provider: Jolanta Poon MD    Allergies: Sulfa Antibiotics    Isolation: None   Infection: None   Code Status: No CPR    Ht: 165.1 cm (65\")   Wt: 80.4 kg (177 lb 3.2 oz)    Admission Cmt: None   Principal Problem: Acute respiratory failure with hypoxia [J96.01]                   Active Insurance as of 10/3/2023       Primary Coverage       Payor Plan Insurance Group Employer/Plan Group    MEDICARE MEDICARE A & B        Payor Plan Address Payor Plan Phone Number Payor Plan Fax Number Effective Dates    [address] [phone]  2015 - None Entered    [place] [id number]         Subscriber Name Subscriber Birth Date Member ID       ROSE HUI 1950 [id number]                     Emergency Contacts        (Rel.) Home Phone Work Phone Mobile Phone    Mary Rodríguez (Daughter) -- -- [phone]                 Discharge Summary        Sandie Garzon APRN at 10/13/23 1006              [facility] Medicine Services  DISCHARGE SUMMARY    Patient Name: Rose Hui  : 1950  MRN: [id number]    Date of Admission: 10/3/2023  5:08 PM  Date of Discharge:  10/13/2023  Primary Care Physician: Carmella Barboza DO    Consults       Date and Time Order Name Status Description    10/5/2023  2:15 PM Inpatient Palliative Care MD " Consult Completed     10/5/2023  8:06 AM Inpatient Nephrology Consult Completed     10/4/2023 12:26 AM Inpatient Gastroenterology Consult Completed             Hospital Course     Presenting Problem: f/u cirrhosis     Active Hospital Problems    Diagnosis  POA    **Acute respiratory failure with hypoxia [J96.01]  Yes    Severe malnutrition [E43]  Yes    CHF (congestive heart failure) [I50.9]  Yes    Anemia [D64.9]  Yes    Anxiety and depression [F41.9, F32.A]  Yes    Chronic back pain [M54.9, G89.29]  Yes    HEIDY (acute kidney injury) [N17.9]  Yes    Hyponatremia [E87.1]  Yes    Cirrhosis of liver [K74.60]  Yes    Generalized weakness [R53.1]  Yes    Cellulitis of right lower extremity [L03.115]  Yes    GERD (gastroesophageal reflux disease) [K21.9]  Yes    Hypertension [I10]  Yes    Atherosclerosis of native arteries of extremities with intermittent claudication, other extremity [I70.218]  Yes    Chronic obstructive pulmonary disease [J44.9]  Yes    Hyperlipidemia [E78.5]  Yes      Resolved Hospital Problems   No resolved problems to display.          Hospital Course:  Laura Hui is a 73 y.o. female  with hx of HTN, HLD, HFpEF, PVD, COPD, chronic back pain, GERD, anemia, and anxiety/depression who presents due to dyspnea x 3 days and weakness. CTA chest demonstrated moderate left effusion and smaller right effusion; cirrhotic liver with large ascites noted. Liver US with cirrhosis, ascites and right pleural effusion. Diagnostic and therapeutic paracentesis was performed on 10/4/23 with 3.3 L removed. SAAG consistent with portal HTN. No evidence of SBP. Creatinine elevated on presentation, noted to be 1.41. Urine sodium less than 20. UA negative. She received albumin and midodrine without improvement. After further discussion with patient/family, decision made to proceed with comfort measures. Palliative Care and Hospice following.      Goals of care  -Hospice following, currently on a comfort focused plan of  care  -discussed with daughter today and MSW Genie. Plan for home with hospice, supplies being delivered today   -No further lab checks or fingersticks, no aggressive measures  -Continue pain control with scheduled and PRN Oxycodone   -Per GI, may benefit from paracentesis before discharge, could consider peritoneal drain placement- monitor for need  -add magic mouth wash for mouth pain/ sore throat     Left > right pleural effusion  Cirrhosis/ascites - new finding   Hypoalbuminemia  - GI followed during admission  - s/p paracentesis 10/4/23 with 3.3 L removed  - Thoracentesis deferred as SOA improved following paracentesis  - ATBx discontinued (had been started on empirically for PNA, but low suspicion for active infection)  - Now comfort measures  -- bumex changed to bid prn for soa and weight gain to help with comfort   -- Xifaxan changed to lactulose prn per hospice recommendations to be more cost effective      HEIDY, hepatorenal syndrome  - Likely hepatorenal. Urine Na < 20. UA negative. S/p albumin and midodrine with no improvement.   - Nephrology followed during admission  - Cr trending up, no further checks given PILY  Jael Vance for comfort     Dyspnea and hypoxia   - She was recently admitted at [facility] and diagnosed with CHF, but had Echo showing EF 75%; diuretic was increased   - CTA chest with no PE, large left pleural effusion and moderate right pleural effusion, atelectasis  - Improvement in respiratory status following paracentesis.   - Initially on Doxycycline/Rocephin, discontinued due to low suspicion for pneumonia     Possible HFpEF  HTN   HLD  - Recent admission with Echo at [facility]  -bumex held due to heidy, will resume as needed at [place] for weight gain or soa   - metoprolol 12.5mg bid, hold parameters  - Continue statin     Acute onset weakness  - Until a couple weeks ago she could walk without a cane or walker  - Now using a wheelchair     Lightheadedness  Borderline hypotension  - Holding Bumex  - s/p  albumin and midodrine without improvement, remains on midodrine currently TID  - metoprolol restarted- BP stable     COPD, emphysema   - Not on supplemental oxygen at home  - Former smoker, now vapes. Encouraged cessation.  - Continue with inhalers      Hyponatremia, hypervolemic   - Cortisol appropriate, TSH mildly elevated at 4.9, T4 normal  - Stopped lab draws     PVD  - Incidental finding of atherosclerosis of left subclavian artery, suggesting high-grade stenosis, or possibly occlusion  - Defer Vascular Surgery consult given GOC     Generalized weakness  - PT/OT attempted, but patient not participating at this time     Anemia  - No further lab draws     GERD  - PPI     Anxiety/depression  - Celexa, Trazodone       Patient will be discharged home today with hospice.       Discharge Follow Up Recommendations for outpatient labs/diagnostics:   Follow up with pcp as needed    Day of Discharge     HPI:   Patient is resting in bed in NAD. She was asking if she is going home today. No acute events overnight per nursing     Review of Systems  Gen- No fevers, chills  CV- No chest pain, palpitations  Resp- No cough, dyspnea  GI- No N/V/D, abd pain      Vital Signs:   Temp:  [97.4 øF (36.3 øC)-98 øF (36.7 øC)] 97.8 øF (36.6 øC)  Heart Rate:  [76-87] 85  Resp:  [16-18] 18  BP: (129-135)/(68-74) 129/68  Flow (L/min):  [2] 2      Physical Exam:  Constitutional: No acute distress, awake, alert, chronically ill appearing   HENT: NCAT, mucous membranes moist  Respiratory: Clear to auscultation bilaterally, respiratory effort normal 2L 92%  Cardiovascular: RRR, no murmurs, rubs, or gallops  Gastrointestinal: Positive bowel sounds, soft, nontender, nondistended  Musculoskeletal: No bilateral ankle edema  Psychiatric: Appropriate affect, cooperative  Neurologic: Oriented x 2, some confusion noted, strength symmetric in all extremities, Cranial Nerves grossly intact to confrontation, speech clear  Skin: No rashes, bruising ashley arms        Pertinent  and/or Most Recent Results     LAB RESULTS:                              Brief Urine Lab Results  (Last result in the past 365 days)        Color   Clarity   Blood   Leuk Est   Nitrite   Protein   CREAT   Urine HCG        10/05/23 1322 Yellow   Clear   Negative   Negative   Negative   Negative                 Microbiology Results (last 10 days)       Procedure Component Value - Date/Time    MRSA Screen, PCR (Inpatient) - Swab, Nares [[id number]]  (Normal) Collected: 10/03/23 2327    Lab Status: Final result Specimen: Swab from Nares Updated: 10/04/23 0811     MRSA PCR Negative    Narrative:      The negative predictive value of this diagnostic test is high and should only be used to consider de-escalating anti-MRSA therapy. A positive result may indicate colonization with MRSA and must be correlated clinically.  MRSA Negative    Blood Culture - Blood, Hand, Left [[id number]]  (Normal) Collected: 10/03/23 1804    Lab Status: Final result Specimen: Blood from Hand, Left Updated: 10/08/23 1815     Blood Culture No growth at 5 days    Respiratory Panel PCR w/COVID-19(SARS-CoV-2) JEN/[facility]/ELIEZER/PAD/COR/MAD/URSULA In-House, NP Swab in UTM/VTM, 3-4 HR TAT - Swab, Nasopharynx [[id number]]  (Normal) Collected: 10/03/23 1726    Lab Status: Final result Specimen: Swab from Nasopharynx Updated: 10/03/23 1825     ADENOVIRUS, PCR Not Detected     Coronavirus 229E Not Detected     Coronavirus HKU1 Not Detected     Coronavirus NL63 Not Detected     Coronavirus OC43 Not Detected     COVID19 Not Detected     Human Metapneumovirus Not Detected     Human Rhinovirus/Enterovirus Not Detected     Influenza A PCR Not Detected     Influenza B PCR Not Detected     Parainfluenza Virus 1 Not Detected     Parainfluenza Virus 2 Not Detected     Parainfluenza Virus 3 Not Detected     Parainfluenza Virus 4 Not Detected     RSV, PCR Not Detected     Bordetella pertussis pcr Not Detected     Bordetella parapertussis PCR Not Detected      Chlamydophila pneumoniae PCR Not Detected     Mycoplasma pneumo by PCR Not Detected    Narrative:      In the setting of a positive respiratory panel with a viral infection PLUS a negative procalcitonin without other underlying concern for bacterial infection, consider observing off antibiotics or discontinuation of antibiotics and continue supportive care. If the respiratory panel is positive for atypical bacterial infection (Bordetella pertussis, Chlamydophila pneumoniae, or Mycoplasma pneumoniae), consider antibiotic de-escalation to target atypical bacterial infection.    Blood Culture - Blood, Arm, Right [[id number]]  (Normal) Collected: 10/03/23 1720    Lab Status: Final result Specimen: Blood from Arm, Right Updated: 10/08/23 1815     Blood Culture No growth at 5 days            US Paracentesis    Result Date: 10/5/2023  US PARACENTESIS  History: new ascites  : Demetrius Izquierdo MD.  Modality: Ultrasonography                   Sedation: None. Anesthesia: Lidocaine 1% local infiltration. Estimated blood loss:  0 cc.        Technique: A thorough discussion of the risks, benefits, and alternatives of the procedure, and if applicable, moderate sedation, was carried out with the patient. They were encouraged to ask any questions. Any questions were answered. They verbalized understanding. A written informed consent was then signed.  A multi-component timeout was performed prior to starting the procedure using the departmental protocol. The procedure room personnel used personal protective equipment. The operators used sterile gloves and if indicated, sterile gowns. The surgical site was prepped with chlorhexidine gluconate  and draped in the maximal applicable sterile fashion. A preliminary ultrasonography was performed to assess the target and determine a safe access site. It showed ascites. Pertinent ultrasound images were stored to the PACS for documentation. The access site was sterilely prepped  and draped. Local anesthesia was administered. A dermatotomy was performed if needed. A catheter over the needle system was advanced into the peritoneal cavity. Straw colored fluid was aspirated and the plastic catheter advanced into the peritoneum. The catheter was then connected to a fluid recovery system. At the end of the procedure, the catheter was withdrawn and an aseptic dressing applied. The patient tolerated the procedure well. After uneventful recovery recovery, the patient was discharged from the department in stable condition. The total amount of fluid recovered is given below. Albumin was infused intravenously if ordered by the referring doctor. Complications: None immediate. Specimen: The specimen was labeled and sent to the lab in appropriate carriers & containers if such was requested by the ordering provider.     Impression:                                                              Successful ultrasound-guided paracentesis using a Right lower quadrant access with recovery of 3.3 liters of fluid as described above. Thank you for the opportunity to assist in the care of your patient. Electronically Signed: Demetrius Izquierdo MD  10/5/2023 8:31 AM EDT  Workstation ID: [id number]    XR Chest 1 View    Result Date: 10/5/2023  XR CHEST 1 VW Date of Exam: 10/5/2023 3:26 AM CDT Indication: Pleural effusion Comparison: 10/3/2023 Findings: Cardiomediastinal silhouette is unchanged. Persistent pulmonary vascular congestion and interstitial edema. There is central pulmonary edema with small to moderate left and small right pleural effusions. No new airspace disease nor pneumothorax. No acute  osseous abnormality identified.     Impression: Moderate CHF/volume overload features are similar to prior exam. Electronically Signed: Isaac Smalls MD  10/5/2023 7:00 AM CDT  Workstation ID: [id number]    US Liver    Result Date: 10/4/2023  US LIVER Date of Exam: 10/4/2023 7:00 AM CDT Indication: new cirrhosis. Comparison: No  comparisons available. Technique: Grayscale and color Doppler ultrasound evaluation of the right upper quadrant was performed. Findings: LIVER: Nodular contours and heterogeneous parenchymal echotexture consistent with cirrhosis. No focal lesions identified. Normal flow is present within the hepatic vasculature. GALLBLADDER: Gallbladder is surgically absent. The common bile duct measures 2 mm in diameter, normal. PANCREAS:  Visualized portions are unremarkable. AORTA/IVC:  Visualized portions are unremarkable. RIGHT KIDNEY:  Normal in size with no focal lesions. No evidence of nephrolithiasis or hydronephrosis. There is moderate volume ascites. There is partial visualization of a right pleural effusion.     Impression: 1.Cirrhosis with ascites and right pleural effusion. Electronically Signed: Isaac Smalls MD  10/4/2023 7:34 AM CDT  Workstation ID: [id number]    CT Angiogram Chest    Result Date: 10/3/2023  CT ANGIOGRAM CHEST Date of Exam: 10/3/2023 7:07 PM EDT Indication: SOB, hypoxic, elevated dimer. Comparison: None available. Technique: CTA of the chest was performed after the uneventful intravenous administration of 85 mL Isovue 370. Reconstructed coronal and sagittal images were also obtained. In addition, a 3-D volume rendered image was created for interpretation. Automated exposure control and iterative reconstruction methods were used. Findings: There is generally good contrast opacification of the pulmonary arteries and no evidence of embolic disease. No evidence of thoracic aortic aneurysm or dissection is seen. There is probably high-grade origin stenosis of the left subclavian artery, with very dense calcification of the lumen on image 26 series 4. No pericardial effusion or mediastinal adenopathy is seen. There is diffuse coronary artery calcium. Multiple macrocalcifications are seen in the normal sized thyroid There is a large free-flowing left pleural effusion and moderate free-flowing right pleural  effusion. There is nearly complete atelectasis of the left lower lobe and milder right lower lobe atelectasis. Mild micro bullous change is seen of the upper lobes. No other evidence of active pulmonary parenchymal disease is appreciated. There is a calcified right middle lobe granuloma. The airways appear to be normally patent. Included images of the upper abdomen show extensive ascites. Liver morphology suggests cirrhosis, with marked enlargement of the left liver lobe and small right liver lobe. There appear to be clips in the gallbladder fossa. Spleen is normal in size. Bony structures appear to be intact.     Impression: 1. No evidence of pulmonary embolic disease. 2. Large free-flowing left pleural effusion and moderate right effusion. 3. Extensive atelectasis of the left lower lobe due to effusion and mild right lower lobe atelectasis. 4. Liver morphology consistent with cirrhosis. Upper abdominal ascites, on these limited images, appears to be extensive. 5. Incidentally noted heavy calcification of the left subclavian artery origin, suggesting high-grade stenosis, or possibly occlusion. Electronically Signed: Brian Sutherland MD  10/3/2023 7:29 PM EDT  Workstation ID: [id number]    CT Head Without Contrast    Result Date: 10/3/2023  CT HEAD WO CONTRAST Date of Exam: 10/3/2023 7:07 PM EDT Indication: gen weakness. Comparison: None available. Technique: Axial CT images were obtained of the head without contrast administration.  Automated exposure control and iterative construction methods were used. FINDINGS: There is no evidence for acute intracranial hemorrhage. No definitive acute focal ischemia is identified. Nonspecific white matter changes are noted likely related to chronic small vessel ischemic changes and age-related changes. Associated diffuse volume loss is observed. There is no evidence for abnormal cerebral edema. There is no mass effect or midline shift. The ventricular system is nondilated. The basal  cisterns are patent. The skull is intact. The paranasal sinuses and mastoid air cells are  clear.     1.No evidence for acute intracranial abnormality. 2.Nonspecific white matter changes are noted with associated diffuse volume loss. These findings are likely related to chronic small vessel ischemic changes and/or age-related changes. Electronically Signed: Peter Don MD  10/3/2023 7:13 PM EDT  Workstation ID: [id number]    XR Chest 1 View    Result Date: 10/3/2023  XR CHEST 1 VW Date of Exam: 10/3/2023 4:12 PM CDT Indication: gen weakness Comparison: 7/19/2023 Findings: Cardiomediastinal silhouette is unremarkable. There is left mid to lower lung airspace disease suggestive of pneumonia. Small to moderate left and small right effusions. No acute osseous abnormality identified.     Impression: 1.Left mid to lower lung airspace disease with small to moderate effusion suggestive of pneumonia. Correlate with symptoms. 2.Persistent small right effusion. Electronically Signed: Isaac Smalls MD  10/3/2023 4:28 PM CDT  Workstation ID: [id number]                 Plan for Follow-up of Pending Labs/Results:     Discharge Details        Discharge Medications        New Medications        Instructions Start Date   First Mouthwash (Magic Mouthwash) suspension   5 mL, Swish & Spit, Every 6 Hours      hydrOXYzine 25 MG tablet  Commonly known as: ATARAX   25 mg, Oral, 3 Times Daily PRN      lactulose 10 GM/15ML solution  Commonly known as: CHRONULAC   30 g, Oral, Every 2 Hours PRN      magic mouthwash oral suspension   5 mL, Swish & Spit, Every 6 Hours      midodrine 10 MG tablet  Commonly known as: PROAMATINE   10 mg, Oral, 3 Times Daily Before Meals      oxyCODONE 5 MG immediate release tablet  Commonly known as: ROXICODONE   5 mg, Oral, Every 8 Hours             Changes to Medications        Instructions Start Date   bumetanide 1 MG tablet  Commonly known as: BUMEX  What changed:   when to take this  reasons to take this   1  mg, Oral, 2 Times Daily PRN      metoprolol tartrate 25 MG tablet  Commonly known as: LOPRESSOR  What changed:   medication strength  how much to take   12.5 mg, Oral, 2 Times Daily With Meals      traZODone 150 MG tablet  Commonly known as: DESYREL  What changed:   medication strength  how much to take   150 mg, Oral, Nightly             Continue These Medications        Instructions Start Date   albuterol (2.5 MG/3ML) 0.083% nebulizer solution  Commonly known as: PROVENTIL   2.5 mg, Nebulization, Every 4 Hours PRN      albuterol sulfate  (90 Base) MCG/ACT inhaler  Commonly known as: PROVENTIL HFA;VENTOLIN HFA;PROAIR HFA   2 puffs, Inhalation, Every 4 Hours PRN      aspirin 81 MG chewable tablet   81 mg, Oral, Daily      atorvastatin 40 MG tablet  Commonly known as: Lipitor   40 mg, Oral, Nightly      citalopram 20 MG tablet  Commonly known as: CeleXA   TAKE ONE TABLET BY MOUTH DAILY      Daliresp 250 MCG tablet tablet  Generic drug: roflumilast   TAKE ONE TABLET BY MOUTH DAILY      fluticasone 50 MCG/ACT nasal spray  Commonly known as: FLONASE   SPRAY TWO SPRAYS IN EACH NOSTRIL ONCE DAILY      multivitamin with fluoride/iron 0.25-10 MG/ML solution solution   Oral, Daily      pantoprazole 40 MG EC tablet  Commonly known as: Protonix   40 mg, Oral, Daily      Trelegy Ellipta 100-62.5-25 MCG/ACT inhaler  Generic drug: Fluticasone-Umeclidin-Vilant   Inhale 1 puff Daily.             Stop These Medications      KLOR-CON 20 MEQ CR tablet  Generic drug: potassium chloride              Allergies   Allergen Reactions    Sulfa Antibiotics Rash         Discharge Disposition:  Hospice/Home    Diet:  Hospital:  Diet Order   Procedures    Diet: Cardiac Diets; Healthy Heart (2-3 Na+); Texture: Regular Texture (IDDSI 7); Fluid Consistency: Thin (IDDSI 0)       Diet Instructions       Diet: Regular/House Diet; Regular Texture (IDDSI 7); Thin (IDDSI 0)      Discharge Diet: Regular/House Diet    Texture: Regular Texture (IDDSI  7)    Fluid Consistency: Thin (IDDSI 0)             Activity:  Activity Instructions       Activity as Tolerated              Restrictions or Other Recommendations:         CODE STATUS:    Code Status and Medical Interventions:   Ordered at: 10/09/23 1506     Level Of Support Discussed With:    Patient    Next of Kin (If No Surrogate)     Code Status (Patient has no pulse and is not breathing):    No CPR (Do Not Attempt to Resuscitate)     Medical Interventions (Patient has pulse or is breathing):    Comfort Measures       Future Appointments   Date Time Provider Department Center   10/13/2023  7:30 PM EMS 1  [facility]   10/19/2023 11:00 AM Carmella Barboza DO [facility]   10/23/2023  2:00 PM Quique Nicholas III, MD [facility]       Additional Instructions for the Follow-ups that You Need to Schedule       Discharge Follow-up with PCP   As directed       Currently Documented PCP:    Carmella Barboza DO    PCP Phone Number:    [phone]     Follow Up Details: follow up with pcp as needed                      SHMUEL Rodriguez  10/13/23      Time Spent on Discharge:  I spent  45  minutes on this discharge activity which included: face-to-face encounter with the patient, reviewing the data in the system, coordination of the care with the nursing staff as well as consultants, documentation, and entering orders.            Electronically signed by Sandie Garzon APRN at 10/13/23 5563

## 2023-10-13 NOTE — PROGRESS NOTES
Pt is scheduled for discharge today, 10/13/23, via ambu pickup at 1930. Pt will be going home with dtrMary. Dtr is home waiting on hospital bed, overbed table, and oxygen to be delivered. Dtr notified of ambu pickup time and knows to call the hospice 24Hr # once pt arrives home for hospice admission. Team notified of discharge plans and the need for a 5 day supply of comfort meds to be sent with pt. Please call [phone] for further needs.    Jo Ann Fernandez, RN, BSN, Hospice Liaison

## 2023-10-13 NOTE — PLAN OF CARE
Problem: Adult Inpatient Plan of Care  Goal: Plan of Care Review  Outcome: Ongoing, Progressing  Goal: Patient-Specific Goal (Individualized)  Outcome: Ongoing, Progressing  Goal: Absence of Hospital-Acquired Illness or Injury  Outcome: Ongoing, Progressing  Intervention: Identify and Manage Fall Risk  Recent Flowsheet Documentation  Taken 10/13/2023 1600 by Susan Pelletier RN  Safety Promotion/Fall Prevention: safety round/check completed  Taken 10/13/2023 1400 by Susan Pelletier RN  Safety Promotion/Fall Prevention: safety round/check completed  Intervention: Prevent Skin Injury  Recent Flowsheet Documentation  Taken 10/13/2023 1600 by Susan Pelletier RN  Body Position: weight shifting  Skin Protection:   incontinence pads utilized   skin sealant/moisture barrier applied   skin-to-skin areas padded   skin-to-device areas padded   transparent dressing maintained   tubing/devices free from skin contact  Taken 10/13/2023 1400 by Susan Pelletier RN  Body Position: patient/family refused  Skin Protection:   incontinence pads utilized   skin sealant/moisture barrier applied   skin-to-skin areas padded   skin-to-device areas padded   transparent dressing maintained   tubing/devices free from skin contact  Intervention: Prevent and Manage VTE (Venous Thromboembolism) Risk  Recent Flowsheet Documentation  Taken 10/13/2023 1600 by Susan Pelletier RN  Activity Management: activity encouraged  Taken 10/13/2023 1400 by Susan Pelletier RN  Activity Management: activity encouraged  Intervention: Prevent Infection  Recent Flowsheet Documentation  Taken 10/13/2023 1600 by Susan Pelletier RN  Infection Prevention: hand hygiene promoted  Taken 10/13/2023 1400 by Susan Pelletier RN  Infection Prevention: hand hygiene promoted  Goal: Optimal Comfort and Wellbeing  Outcome: Ongoing, Progressing  Goal: Readiness for Transition of Care  Outcome: Ongoing, Progressing     Problem: Fall Injury Risk  Goal: Absence of Fall and Fall-Related  Injury  Outcome: Ongoing, Progressing  Intervention: Identify and Manage Contributors  Recent Flowsheet Documentation  Taken 10/13/2023 1600 by Susan Pelletier RN  Medication Review/Management: medications reviewed  Taken 10/13/2023 1400 by Susan Pelletier RN  Medication Review/Management: medications reviewed  Intervention: Promote Injury-Free Environment  Recent Flowsheet Documentation  Taken 10/13/2023 1600 by Susan Pelletier RN  Safety Promotion/Fall Prevention: safety round/check completed  Taken 10/13/2023 1400 by Susan Pelletier RN  Safety Promotion/Fall Prevention: safety round/check completed     Problem: Skin Injury Risk Increased  Goal: Skin Health and Integrity  Outcome: Ongoing, Progressing  Intervention: Optimize Skin Protection  Recent Flowsheet Documentation  Taken 10/13/2023 1600 by Susan Pelletier RN  Pressure Reduction Techniques:   frequent weight shift encouraged   heels elevated off bed   positioned off wounds   pressure points protected   weight shift assistance provided  Head of Bed (HOB) Positioning: HOB elevated  Pressure Reduction Devices:   foam padding utilized   heel offloading device utilized   positioning supports utilized  Skin Protection:   incontinence pads utilized   skin sealant/moisture barrier applied   skin-to-skin areas padded   skin-to-device areas padded   transparent dressing maintained   tubing/devices free from skin contact  Taken 10/13/2023 1400 by Susan Pelletier RN  Pressure Reduction Techniques: (PT REFUSED TO BE TURNED)   frequent weight shift encouraged   heels elevated off bed   positioned off wounds   pressure points protected   weight shift assistance provided  Head of Bed (HOB) Positioning: HOB elevated  Pressure Reduction Devices:   foam padding utilized   heel offloading device utilized   positioning supports utilized  Skin Protection:   incontinence pads utilized   skin sealant/moisture barrier applied   skin-to-skin areas padded   skin-to-device areas  padded   transparent dressing maintained   tubing/devices free from skin contact     Problem: Palliative Care  Goal: Enhanced Quality of Life  Outcome: Ongoing, Progressing   Goal Outcome Evaluation:

## 2023-10-13 NOTE — PLAN OF CARE
Goal Outcome Evaluation:  Plan of Care Reviewed With: patient        Progress: no change  Outcome Evaluation: Pt. lying in bed on 2LNC denying pain, nause and dyspnea.  She did demonstrate increased WOB at rest however.  Breakfast tray remained untouched at bedside.  Asked pt if she would like to eat or drink and she stated she is not hungry.  Tech entered room to help set up breakfast tray but pt remained adamant that she was not hungry.  Plan for pt to discharge home with hospice today at 1930.  Palliative to follow until discharge.

## 2023-10-17 RX ORDER — TRAZODONE HYDROCHLORIDE 100 MG/1
TABLET ORAL
Qty: 180 TABLET | Refills: 1 | OUTPATIENT
Start: 2023-10-17